# Patient Record
Sex: MALE | Race: WHITE | Employment: FULL TIME | URBAN - METROPOLITAN AREA
[De-identification: names, ages, dates, MRNs, and addresses within clinical notes are randomized per-mention and may not be internally consistent; named-entity substitution may affect disease eponyms.]

---

## 2017-04-04 ENCOUNTER — TELEPHONE (OUTPATIENT)
Dept: GASTROENTEROLOGY | Age: 62
End: 2017-04-04

## 2020-04-05 ENCOUNTER — APPOINTMENT (OUTPATIENT)
Dept: GENERAL RADIOLOGY | Age: 65
DRG: 234 | End: 2020-04-05
Payer: OTHER GOVERNMENT

## 2020-04-05 ENCOUNTER — HOSPITAL ENCOUNTER (INPATIENT)
Age: 65
LOS: 8 days | Discharge: HOME | DRG: 234 | End: 2020-04-13
Attending: EMERGENCY MEDICINE | Admitting: INTERNAL MEDICINE
Payer: OTHER GOVERNMENT

## 2020-04-05 PROBLEM — R07.9 CHEST PAIN: Status: ACTIVE | Noted: 2020-04-05

## 2020-04-05 LAB
ALBUMIN SERPL-MCNC: 3.5 GM/DL (ref 3.4–5)
ALP BLD-CCNC: 103 IU/L (ref 40–129)
ALT SERPL-CCNC: 5 U/L (ref 10–40)
ANION GAP SERPL CALCULATED.3IONS-SCNC: 12 MMOL/L (ref 4–16)
AST SERPL-CCNC: 5 IU/L (ref 15–37)
BASOPHILS ABSOLUTE: 0 K/CU MM
BASOPHILS RELATIVE PERCENT: 0.7 % (ref 0–1)
BILIRUB SERPL-MCNC: 0.3 MG/DL (ref 0–1)
BUN BLDV-MCNC: 11 MG/DL (ref 6–23)
CALCIUM SERPL-MCNC: 8.5 MG/DL (ref 8.3–10.6)
CHLORIDE BLD-SCNC: 97 MMOL/L (ref 99–110)
CO2: 20 MMOL/L (ref 21–32)
CREAT SERPL-MCNC: 0.8 MG/DL (ref 0.9–1.3)
DIFFERENTIAL TYPE: ABNORMAL
EOSINOPHILS ABSOLUTE: 0.1 K/CU MM
EOSINOPHILS RELATIVE PERCENT: 2.3 % (ref 0–3)
GFR AFRICAN AMERICAN: >60 ML/MIN/1.73M2
GFR NON-AFRICAN AMERICAN: >60 ML/MIN/1.73M2
GLUCOSE BLD-MCNC: 182 MG/DL (ref 70–99)
GLUCOSE BLD-MCNC: 237 MG/DL (ref 70–99)
GLUCOSE BLD-MCNC: 269 MG/DL (ref 70–99)
HCT VFR BLD CALC: 42.9 % (ref 42–52)
HEMOGLOBIN: 15.4 GM/DL (ref 13.5–18)
IMMATURE NEUTROPHIL %: 0.3 % (ref 0–0.43)
LYMPHOCYTES ABSOLUTE: 2 K/CU MM
LYMPHOCYTES RELATIVE PERCENT: 33.3 % (ref 24–44)
MCH RBC QN AUTO: 31.9 PG (ref 27–31)
MCHC RBC AUTO-ENTMCNC: 35.9 % (ref 32–36)
MCV RBC AUTO: 88.8 FL (ref 78–100)
MONOCYTES ABSOLUTE: 0.6 K/CU MM
MONOCYTES RELATIVE PERCENT: 10.3 % (ref 0–4)
NUCLEATED RBC %: 0 %
PDW BLD-RTO: 11.9 % (ref 11.7–14.9)
PLATELET # BLD: 283 K/CU MM (ref 140–440)
PMV BLD AUTO: 9.4 FL (ref 7.5–11.1)
POTASSIUM SERPL-SCNC: 4.1 MMOL/L (ref 3.5–5.1)
PRO-BNP: 84.22 PG/ML
RBC # BLD: 4.83 M/CU MM (ref 4.6–6.2)
SEGMENTED NEUTROPHILS ABSOLUTE COUNT: 3.2 K/CU MM
SEGMENTED NEUTROPHILS RELATIVE PERCENT: 53.1 % (ref 36–66)
SODIUM BLD-SCNC: 129 MMOL/L (ref 135–145)
TOTAL IMMATURE NEUTOROPHIL: 0.02 K/CU MM
TOTAL NUCLEATED RBC: 0 K/CU MM
TOTAL PROTEIN: 6.5 GM/DL (ref 6.4–8.2)
TROPONIN T: 0.01 NG/ML
TROPONIN T: 0.1 NG/ML
WBC # BLD: 6.1 K/CU MM (ref 4–10.5)

## 2020-04-05 PROCEDURE — 6360000002 HC RX W HCPCS: Performed by: INTERNAL MEDICINE

## 2020-04-05 PROCEDURE — 96374 THER/PROPH/DIAG INJ IV PUSH: CPT

## 2020-04-05 PROCEDURE — 2580000003 HC RX 258: Performed by: INTERNAL MEDICINE

## 2020-04-05 PROCEDURE — 6370000000 HC RX 637 (ALT 250 FOR IP): Performed by: INTERNAL MEDICINE

## 2020-04-05 PROCEDURE — 6360000002 HC RX W HCPCS: Performed by: EMERGENCY MEDICINE

## 2020-04-05 PROCEDURE — 84484 ASSAY OF TROPONIN QUANT: CPT

## 2020-04-05 PROCEDURE — 6370000000 HC RX 637 (ALT 250 FOR IP): Performed by: EMERGENCY MEDICINE

## 2020-04-05 PROCEDURE — 93005 ELECTROCARDIOGRAM TRACING: CPT | Performed by: EMERGENCY MEDICINE

## 2020-04-05 PROCEDURE — 93005 ELECTROCARDIOGRAM TRACING: CPT | Performed by: INTERNAL MEDICINE

## 2020-04-05 PROCEDURE — 1200000000 HC SEMI PRIVATE

## 2020-04-05 PROCEDURE — 99222 1ST HOSP IP/OBS MODERATE 55: CPT | Performed by: INTERNAL MEDICINE

## 2020-04-05 PROCEDURE — 2700000000 HC OXYGEN THERAPY PER DAY

## 2020-04-05 PROCEDURE — 71045 X-RAY EXAM CHEST 1 VIEW: CPT

## 2020-04-05 PROCEDURE — 36415 COLL VENOUS BLD VENIPUNCTURE: CPT

## 2020-04-05 PROCEDURE — 82962 GLUCOSE BLOOD TEST: CPT

## 2020-04-05 PROCEDURE — 83880 ASSAY OF NATRIURETIC PEPTIDE: CPT

## 2020-04-05 PROCEDURE — 85025 COMPLETE CBC W/AUTO DIFF WBC: CPT

## 2020-04-05 PROCEDURE — 80053 COMPREHEN METABOLIC PANEL: CPT

## 2020-04-05 PROCEDURE — 94640 AIRWAY INHALATION TREATMENT: CPT

## 2020-04-05 PROCEDURE — 99285 EMERGENCY DEPT VISIT HI MDM: CPT

## 2020-04-05 PROCEDURE — 94761 N-INVAS EAR/PLS OXIMETRY MLT: CPT

## 2020-04-05 RX ORDER — PANTOPRAZOLE SODIUM 40 MG/1
40 TABLET, DELAYED RELEASE ORAL DAILY
Status: DISCONTINUED | OUTPATIENT
Start: 2020-04-05 | End: 2020-04-06

## 2020-04-05 RX ORDER — FAMOTIDINE 20 MG/1
20 TABLET, FILM COATED ORAL DAILY
Status: DISCONTINUED | OUTPATIENT
Start: 2020-04-05 | End: 2020-04-05 | Stop reason: SDUPTHER

## 2020-04-05 RX ORDER — ASPIRIN 81 MG/1
324 TABLET, CHEWABLE ORAL ONCE
Status: COMPLETED | OUTPATIENT
Start: 2020-04-05 | End: 2020-04-05

## 2020-04-05 RX ORDER — LOSARTAN POTASSIUM 50 MG/1
50 TABLET ORAL DAILY
Status: DISCONTINUED | OUTPATIENT
Start: 2020-04-05 | End: 2020-04-05

## 2020-04-05 RX ORDER — ONDANSETRON 2 MG/ML
4 INJECTION INTRAMUSCULAR; INTRAVENOUS EVERY 6 HOURS PRN
Status: DISCONTINUED | OUTPATIENT
Start: 2020-04-05 | End: 2020-04-09

## 2020-04-05 RX ORDER — SODIUM CHLORIDE 9 MG/ML
INJECTION, SOLUTION INTRAVENOUS CONTINUOUS
Status: DISCONTINUED | OUTPATIENT
Start: 2020-04-05 | End: 2020-04-07

## 2020-04-05 RX ORDER — LOSARTAN POTASSIUM 50 MG/1
50 TABLET ORAL ONCE
Status: COMPLETED | OUTPATIENT
Start: 2020-04-05 | End: 2020-04-05

## 2020-04-05 RX ORDER — KETOROLAC TROMETHAMINE 30 MG/ML
30 INJECTION, SOLUTION INTRAMUSCULAR; INTRAVENOUS
Status: COMPLETED | OUTPATIENT
Start: 2020-04-05 | End: 2020-04-05

## 2020-04-05 RX ORDER — SODIUM CHLORIDE 0.9 % (FLUSH) 0.9 %
10 SYRINGE (ML) INJECTION EVERY 12 HOURS SCHEDULED
Status: DISCONTINUED | OUTPATIENT
Start: 2020-04-05 | End: 2020-04-06 | Stop reason: SDUPTHER

## 2020-04-05 RX ORDER — SODIUM CHLORIDE 0.9 % (FLUSH) 0.9 %
10 SYRINGE (ML) INJECTION PRN
Status: DISCONTINUED | OUTPATIENT
Start: 2020-04-05 | End: 2020-04-06 | Stop reason: SDUPTHER

## 2020-04-05 RX ORDER — MORPHINE SULFATE 2 MG/ML
2 INJECTION, SOLUTION INTRAMUSCULAR; INTRAVENOUS ONCE
Status: COMPLETED | OUTPATIENT
Start: 2020-04-05 | End: 2020-04-05

## 2020-04-05 RX ORDER — POLYETHYLENE GLYCOL 3350 17 G/17G
17 POWDER, FOR SOLUTION ORAL DAILY PRN
Status: DISCONTINUED | OUTPATIENT
Start: 2020-04-05 | End: 2020-04-09

## 2020-04-05 RX ORDER — ASPIRIN 81 MG/1
81 TABLET, CHEWABLE ORAL DAILY
Status: DISCONTINUED | OUTPATIENT
Start: 2020-04-06 | End: 2020-04-06

## 2020-04-05 RX ORDER — LIDOCAINE HYDROCHLORIDE 20 MG/ML
15 SOLUTION OROPHARYNGEAL
Status: DISCONTINUED | OUTPATIENT
Start: 2020-04-05 | End: 2020-04-09

## 2020-04-05 RX ORDER — MORPHINE SULFATE 4 MG/ML
4 INJECTION, SOLUTION INTRAMUSCULAR; INTRAVENOUS ONCE
Status: COMPLETED | OUTPATIENT
Start: 2020-04-05 | End: 2020-04-05

## 2020-04-05 RX ORDER — ALBUTEROL SULFATE 2.5 MG/3ML
2.5 SOLUTION RESPIRATORY (INHALATION) EVERY 4 HOURS PRN
Status: DISCONTINUED | OUTPATIENT
Start: 2020-04-05 | End: 2020-04-09

## 2020-04-05 RX ORDER — KETOROLAC TROMETHAMINE 30 MG/ML
30 INJECTION, SOLUTION INTRAMUSCULAR; INTRAVENOUS EVERY 6 HOURS PRN
Status: DISCONTINUED | OUTPATIENT
Start: 2020-04-05 | End: 2020-04-07

## 2020-04-05 RX ORDER — MAGNESIUM HYDROXIDE/ALUMINUM HYDROXICE/SIMETHICONE 120; 1200; 1200 MG/30ML; MG/30ML; MG/30ML
30 SUSPENSION ORAL EVERY 6 HOURS PRN
Status: DISCONTINUED | OUTPATIENT
Start: 2020-04-05 | End: 2020-04-09

## 2020-04-05 RX ORDER — LOSARTAN POTASSIUM 100 MG/1
100 TABLET ORAL DAILY
Status: DISCONTINUED | OUTPATIENT
Start: 2020-04-06 | End: 2020-04-07

## 2020-04-05 RX ORDER — KETOROLAC TROMETHAMINE 30 MG/ML
30 INJECTION, SOLUTION INTRAMUSCULAR; INTRAVENOUS EVERY 6 HOURS PRN
Status: DISCONTINUED | OUTPATIENT
Start: 2020-04-05 | End: 2020-04-05 | Stop reason: SDUPTHER

## 2020-04-05 RX ORDER — GLIPIZIDE 10 MG/1
10 TABLET ORAL
COMMUNITY
End: 2021-08-26 | Stop reason: SDUPTHER

## 2020-04-05 RX ORDER — ATORVASTATIN CALCIUM 40 MG/1
40 TABLET, FILM COATED ORAL NIGHTLY
Status: DISCONTINUED | OUTPATIENT
Start: 2020-04-05 | End: 2020-04-09

## 2020-04-05 RX ORDER — ALOGLIPTIN 25 MG/1
25 TABLET, FILM COATED ORAL DAILY
COMMUNITY
End: 2021-08-26 | Stop reason: SDUPTHER

## 2020-04-05 RX ORDER — NITROGLYCERIN 0.4 MG/1
0.4 TABLET SUBLINGUAL EVERY 5 MIN PRN
Status: COMPLETED | OUTPATIENT
Start: 2020-04-05 | End: 2020-04-05

## 2020-04-05 RX ORDER — INSULIN GLARGINE 100 [IU]/ML
10 INJECTION, SOLUTION SUBCUTANEOUS NIGHTLY
Status: DISCONTINUED | OUTPATIENT
Start: 2020-04-05 | End: 2020-04-09

## 2020-04-05 RX ORDER — PROMETHAZINE HYDROCHLORIDE 25 MG/1
12.5 TABLET ORAL EVERY 6 HOURS PRN
Status: DISCONTINUED | OUTPATIENT
Start: 2020-04-05 | End: 2020-04-09

## 2020-04-05 RX ORDER — ASPIRIN 81 MG/1
81 TABLET, CHEWABLE ORAL DAILY
Status: DISCONTINUED | OUTPATIENT
Start: 2020-04-05 | End: 2020-04-05 | Stop reason: SDUPTHER

## 2020-04-05 RX ORDER — ATORVASTATIN CALCIUM 40 MG/1
40 TABLET, FILM COATED ORAL DAILY
COMMUNITY
End: 2021-08-26 | Stop reason: SDUPTHER

## 2020-04-05 RX ADMIN — ALUMINUM HYDROXIDE, MAGNESIUM HYDROXIDE, AND SIMETHICONE 30 ML: 200; 200; 20 SUSPENSION ORAL at 13:55

## 2020-04-05 RX ADMIN — SODIUM CHLORIDE, PRESERVATIVE FREE 10 ML: 5 INJECTION INTRAVENOUS at 22:20

## 2020-04-05 RX ADMIN — MORPHINE SULFATE 4 MG: 4 INJECTION, SOLUTION INTRAMUSCULAR; INTRAVENOUS at 08:39

## 2020-04-05 RX ADMIN — NITROGLYCERIN 0.4 MG: 0.4 TABLET, ORALLY DISINTEGRATING SUBLINGUAL at 06:46

## 2020-04-05 RX ADMIN — INSULIN GLARGINE 10 UNITS: 100 INJECTION, SOLUTION SUBCUTANEOUS at 22:13

## 2020-04-05 RX ADMIN — INSULIN LISPRO 2 UNITS: 100 INJECTION, SOLUTION INTRAVENOUS; SUBCUTANEOUS at 17:43

## 2020-04-05 RX ADMIN — SODIUM CHLORIDE: 9 INJECTION, SOLUTION INTRAVENOUS at 22:20

## 2020-04-05 RX ADMIN — ASPIRIN 81 MG 324 MG: 81 TABLET ORAL at 06:46

## 2020-04-05 RX ADMIN — LOSARTAN POTASSIUM 50 MG: 50 TABLET, FILM COATED ORAL at 17:24

## 2020-04-05 RX ADMIN — INSULIN LISPRO 1 UNITS: 100 INJECTION, SOLUTION INTRAVENOUS; SUBCUTANEOUS at 22:14

## 2020-04-05 RX ADMIN — NITROGLYCERIN 0.4 MG: 0.4 TABLET, ORALLY DISINTEGRATING SUBLINGUAL at 20:55

## 2020-04-05 RX ADMIN — MORPHINE SULFATE 2 MG: 2 INJECTION, SOLUTION INTRAMUSCULAR; INTRAVENOUS at 11:13

## 2020-04-05 RX ADMIN — SODIUM CHLORIDE, PRESERVATIVE FREE 10 ML: 5 INJECTION INTRAVENOUS at 11:12

## 2020-04-05 RX ADMIN — LOSARTAN POTASSIUM 50 MG: 50 TABLET, FILM COATED ORAL at 11:12

## 2020-04-05 RX ADMIN — LOSARTAN POTASSIUM 50 MG: 50 TABLET, FILM COATED ORAL at 22:15

## 2020-04-05 RX ADMIN — LIDOCAINE HYDROCHLORIDE 15 ML: 20 SOLUTION ORAL; TOPICAL at 13:56

## 2020-04-05 RX ADMIN — SODIUM CHLORIDE: 9 INJECTION, SOLUTION INTRAVENOUS at 13:30

## 2020-04-05 RX ADMIN — ATORVASTATIN CALCIUM 40 MG: 40 TABLET, FILM COATED ORAL at 22:15

## 2020-04-05 RX ADMIN — ENOXAPARIN SODIUM 120 MG: 120 INJECTION SUBCUTANEOUS at 17:24

## 2020-04-05 RX ADMIN — PROMETHAZINE HYDROCHLORIDE 12.5 MG: 25 TABLET ORAL at 22:14

## 2020-04-05 RX ADMIN — SODIUM CHLORIDE: 9 INJECTION, SOLUTION INTRAVENOUS at 11:13

## 2020-04-05 RX ADMIN — ALBUTEROL SULFATE 2.5 MG: 2.5 SOLUTION RESPIRATORY (INHALATION) at 20:59

## 2020-04-05 RX ADMIN — PANTOPRAZOLE SODIUM 40 MG: 40 TABLET, DELAYED RELEASE ORAL at 11:12

## 2020-04-05 RX ADMIN — KETOROLAC TROMETHAMINE 30 MG: 30 INJECTION, SOLUTION INTRAMUSCULAR at 22:13

## 2020-04-05 RX ADMIN — KETOROLAC TROMETHAMINE 30 MG: 30 INJECTION, SOLUTION INTRAMUSCULAR at 13:55

## 2020-04-05 RX ADMIN — FAMOTIDINE 20 MG: 20 TABLET, FILM COATED ORAL at 11:12

## 2020-04-05 RX ADMIN — NITROGLYCERIN 0.4 MG: 0.4 TABLET, ORALLY DISINTEGRATING SUBLINGUAL at 10:50

## 2020-04-05 ASSESSMENT — PAIN SCALES - GENERAL
PAINLEVEL_OUTOF10: 8
PAINLEVEL_OUTOF10: 7
PAINLEVEL_OUTOF10: 1
PAINLEVEL_OUTOF10: 5
PAINLEVEL_OUTOF10: 8

## 2020-04-05 ASSESSMENT — PAIN DESCRIPTION - PAIN TYPE
TYPE: ACUTE PAIN

## 2020-04-05 ASSESSMENT — PAIN DESCRIPTION - FREQUENCY
FREQUENCY: CONTINUOUS

## 2020-04-05 ASSESSMENT — PAIN DESCRIPTION - PROGRESSION
CLINICAL_PROGRESSION: RAPIDLY IMPROVING
CLINICAL_PROGRESSION: GRADUALLY IMPROVING
CLINICAL_PROGRESSION: RAPIDLY IMPROVING
CLINICAL_PROGRESSION: RAPIDLY IMPROVING

## 2020-04-05 ASSESSMENT — PAIN DESCRIPTION - LOCATION
LOCATION: CHEST

## 2020-04-05 ASSESSMENT — PAIN DESCRIPTION - ONSET
ONSET: PROGRESSIVE
ONSET: GRADUAL
ONSET: GRADUAL

## 2020-04-05 ASSESSMENT — PAIN DESCRIPTION - ORIENTATION
ORIENTATION: ANTERIOR;MID;LEFT;RIGHT
ORIENTATION: ANTERIOR;LEFT;RIGHT;MID

## 2020-04-05 ASSESSMENT — PAIN DESCRIPTION - DESCRIPTORS
DESCRIPTORS: DISCOMFORT;TIGHTNESS
DESCRIPTORS: DISCOMFORT;PRESSURE

## 2020-04-05 NOTE — ED PROVIDER NOTES
hospitalist team.    Clinical Impression:  1. Chest pain, unspecified type      Disposition referral (if applicable):  No follow-up provider specified. Disposition medications (if applicable):  Current Discharge Medication List        ED Provider Disposition Time  DISPOSITION Admitted 04/05/2020 09:24:00 AM      Comment: Please note this report has been produced using speech recognition software and may contain errors related to that system including errors in grammar, punctuation, and spelling, as well as words and phrases that may be inappropriate. Efforts were made to edit the dictations.         Roxie Mars MD  04/05/20 7320

## 2020-04-05 NOTE — CONSULTS
CARDIOLOGY CONSULT NOTE   Reason for consultation: chest pain / NSTEMI    Referring physician:  Blair Orozco MD     Primary care physician: No primary care provider on file. Dear Dr. Vikas Jackson  Thanks for the consult. History of present illness:Marshal is a 62 y. o.year old who  presents with chest pain, its localized to lower portion of sternum, non radiating, ache like, 5/10, constant, no aggravating or alleviating factors noted, he was given, nTG WHICH did not help, and morphine helped him and he slept comfortably, 2 weeks ago he was in [facility], had +ve trop max 0.3, he did not have echo/stress or any other ischemia work  Up, he was discharged on 3 days of antibioitcs, he has CXR on admission which showed possible rt lower lobe pneumonia. Today his 2nd trop is 0.09 and EKG is normal./  Chief Complaint   Patient presents with    Chest Pain     Blood pressure, cholesterol, blood glucose and weight are well controlled. Past medical history:    has a past medical history of Diabetes mellitus ([address] Hyperlipidemia, and Hypertension. Past surgical history:   has a past surgical history that includes Cholecystectomy; hernia repair; fracture surgery; Anus surgery; Colonoscopy (2008); Colonoscopy (11/29/2016); and Endoscopy, colon, diagnostic (11/29/2016). Social History:   reports that he has quit smoking. He has never used smokeless tobacco. He reports that he does not drink alcohol or use drugs.   Family history:   no family history of CAD, STROKE of DM    Allergies   Allergen Reactions    Vicodin [Hydrocodone-Acetaminophen] Other (See Comments)     Dislikes the feeling it gives him    Percocet [Oxycodone-Acetaminophen] Nausea And Vomiting       nitroGLYCERIN (NITROSTAT) SL tablet 0.4 mg, Q5 Min PRN  albuterol (PROVENTIL) nebulizer solution 2.5 mg, Q4H PRN  losartan (COZAAR) tablet 50 mg, Daily  pantoprazole (PROTONIX) tablet 40 mg, Daily  famotidine (PEPCID) tablet 20 mg, Daily  sodium chloride flush 0.9 % injection 10 mL, 2 times per day  sodium chloride flush 0.9 % injection 10 mL, PRN  polyethylene glycol (GLYCOLAX) packet 17 g, Daily PRN  promethazine (PHENERGAN) tablet 12.5 mg, Q6H PRN    Or  ondansetron (ZOFRAN) injection 4 mg, Q6H PRN  atorvastatin (LIPITOR) tablet 40 mg, Nightly  [START ON 4/6/2020] aspirin chewable tablet 81 mg, Daily  0.9 % sodium chloride infusion, Continuous  ketorolac (TORADOL) injection 30 mg, Once PRN  lidocaine viscous hcl (XYLOCAINE) 2 % solution 15 mL, Q3H PRN  aluminum & magnesium hydroxide-simethicone (MAALOX) 200-200-20 MG/5ML suspension 30 mL, Q6H PRN  [START ON 4/6/2020] enoxaparin (LOVENOX) injection 120 mg, Daily      Current Facility-Administered Medications   Medication Dose Route Frequency Provider Last Rate Last Dose    nitroGLYCERIN (NITROSTAT) SL tablet 0.4 mg  0.4 mg Sublingual Q5 Min PRN Aurelio Cramer MD   0.4 mg at 04/05/20 1050    albuterol (PROVENTIL) nebulizer solution 2.5 mg  2.5 mg Nebulization Q4H PRN Angel Perez MD        losartan (COZAAR) tablet 50 mg  50 mg Oral Daily Angel Perez MD   50 mg at 04/05/20 1112    pantoprazole (PROTONIX) tablet 40 mg  40 mg Oral Daily Angel Perez MD   40 mg at 04/05/20 1112    famotidine (PEPCID) tablet 20 mg  20 mg Oral Daily Angel Perez MD   20 mg at 04/05/20 1112    sodium chloride flush 0.9 % injection 10 mL  10 mL Intravenous 2 times per day Angel Perez MD   10 mL at 04/05/20 1112    sodium chloride flush 0.9 % injection 10 mL  10 mL Intravenous PRN Angel Perez MD        polyethylene glycol [facility]) packet 17 g  17 g Oral Daily PRN Angel Perez MD        promethazine (PHENERGAN) tablet 12.5 mg  12.5 mg Oral Q6H PRN Angel Perez MD        Or    ondansetron [facility]) injection 4 mg  4 mg Intravenous Q6H PRN Angel Perez MD        atorvastatin (LIPITOR) tablet 40 mg  40 mg Oral Nightly Angel Perez MD        [START ON 4/6/2020] aspirin chewable tablet 81 mg  81 mg Oral Daily Angel Perez MD        0.9 % sodium chloride infusion   Intravenous Continuous Kevin Lainez MD 75 mL/hr at 04/05/20 1113      ketorolac (TORADOL) injection 30 mg  30 mg Intravenous Once PRN Kevin Lainez MD        lidocaine viscous hcl (XYLOCAINE) 2 % solution 15 mL  15 mL Mouth/Throat Q3H PRN Kevin Lainez MD        aluminum & magnesium hydroxide-simethicone (MAALOX) 200-200-20 MG/5ML suspension 30 mL  30 mL Oral Q6H PRN Kevin Lainez MD       Silvina Vasquez [START ON 4/6/2020] enoxaparin (LOVENOX) injection 120 mg  1 mg/kg Subcutaneous Daily Kevin Lainez MD         Review of Systems:   · Constitutional: No Fever or Weight Loss   · Eyes: No Decreased Vision  · ENT: No Headaches, Hearing Loss or Vertigo  · Cardiovascular: +chest pain, dyspnea on exertion, palpitations or loss of consciousness  · Respiratory: No cough or wheezing    · Gastrointestinal: No abdominal pain, appetite loss, blood in stools, constipation, diarrhea or heartburn  · Genitourinary: No dysuria, trouble voiding, or hematuria  · Musculoskeletal:  No gait disturbance, weakness or joint complaints  · Integumentary: No rash or pruritis  · Neurological: No TIA or stroke symptoms  · Psychiatric: No anxiety or depression  · Endocrine: No malaise, fatigue or temperature intolerance  · Hematologic/Lymphatic: No bleeding problems, blood clots or swollen lymph nodes  · Allergic/Immunologic: No nasal congestion or hives  All systems negative except as marked. ·   ·      Physical Examination:    Vitals:    04/05/20 1152   BP: 153/94   Pulse: 73   Resp: 13   Temp: afebrile   SpO2: 97%      Wt Readings from Last 3 Encounters:   04/05/20 248 lb (112.5 kg)   11/29/16 245 lb (111.1 kg)   11/28/16 245 lb (111.1 kg)     Body mass index is 35.58 kg/m². General Appearance:  No distress, conversant    Constitutional:  Well developed, Well nourished, No acute distress, Non-toxic appearance.    HENT:  Normocephalic, Atraumatic, Bilateral external ears normal, Oropharynx

## 2020-04-05 NOTE — H&P
History and Physical      Name:  Anika Santiago /Age/Sex: 1955  (62 y.o. male)   MRN & CSN:  [id number] & [id number] Admission Date/Time: 2020  6:10 AM   Location:  [facility] PCP: No primary care provider on file. Hospital Day: 1        History of Present Illness:     Chief Complaint: chest pain     Anika Santiago is a 62 y.o.  male with a history of DM II who presents with chest pain which began this morning at 4:30 AM.  He says that the pain is constant. It is 6-8/10 pain. He says that it is like a squeezing pain. He says that this began after he went to the bathroom in the morning. Nothing makes it better, nothing makes it worse. He does say that he has a history of esophageal dyskinesia. He says that this feels like that but much worse. He usually drinks cold water and it goes. He says he used to see Dr. Sonia Vasquez and then another doctor who moved to [facility]. His troponin was 0.012 and then 0.096. He is being admitted for a rule out. Ten point ROS reviewed negative, unless as noted above    Objective:   No intake or output data in the 24 hours ending 20 1218   Vitals:   Vitals:    20 1152   BP:    Pulse:    Resp: 13   Temp:    SpO2: 97%     Physical Exam:   GEN Awake male, sitting upright in bed in no apparent distress. Appears given age. EYES Pupils are equally round. No scleral erythema, discharge, or conjunctivitis. HENT Mucous membranes are moist. Oral pharynx without exudates, no evidence of thrush. NECK Supple, no apparent thyromegaly or masses. RESP Clear to auscultation, no wheezes, rales or rhonchi. Symmetric chest movement while on room air. CARDIO/VASC S1/S2 auscultated. Regular rate without appreciable murmurs, rubs, or gallops. No JVD or carotid bruits. Peripheral pulses equal bilaterally and palpable. No peripheral edema. GI Abdomen is soft without significant tenderness, masses, or guarding. Bowel sounds are normoactive.  Rectal exam

## 2020-04-05 NOTE — PROGRESS NOTES
B/P 176/101, he takes losartan 100mg at home and only 50mg was ordered here. The toradol and/or GI cocktail helped his pain some, he is rating it at a 2 right now. Dr. Jack Dodd paged because he has a troponin ordered for 6 am to see if he wants another troponin before 6 am because last troponin was at 1055.   Dr. Jack Dodd responded and he does not want any troponins until 6 am.

## 2020-04-05 NOTE — ED NOTES
Bed: ED-16  Expected date: 4/5/20  Expected time: 6:10 AM  [facility]: [facility]  Comments:  EMS- [id number], RN  04/05/20 9795

## 2020-04-06 ENCOUNTER — APPOINTMENT (OUTPATIENT)
Dept: ULTRASOUND IMAGING | Age: 65
DRG: 234 | End: 2020-04-06
Payer: OTHER GOVERNMENT

## 2020-04-06 ENCOUNTER — APPOINTMENT (OUTPATIENT)
Dept: CT IMAGING | Age: 65
DRG: 234 | End: 2020-04-06
Payer: OTHER GOVERNMENT

## 2020-04-06 PROBLEM — I25.119 CORONARY ARTERY DISEASE INVOLVING NATIVE CORONARY ARTERY OF NATIVE HEART WITH ANGINA PECTORIS (HCC): Status: ACTIVE | Noted: 2020-04-06

## 2020-04-06 PROBLEM — I21.4 NSTEMI (NON-ST ELEVATED MYOCARDIAL INFARCTION) (HCC): Status: ACTIVE | Noted: 2020-04-06

## 2020-04-06 PROBLEM — E11.59 TYPE 2 DIABETES MELLITUS WITH CIRCULATORY DISORDER, WITHOUT LONG-TERM CURRENT USE OF INSULIN (HCC): Status: ACTIVE | Noted: 2020-04-06

## 2020-04-06 LAB
ALBUMIN SERPL-MCNC: 3.8 GM/DL (ref 3.4–5)
ALP BLD-CCNC: 113 IU/L (ref 40–128)
ALT SERPL-CCNC: 22 U/L (ref 10–40)
ANION GAP SERPL CALCULATED.3IONS-SCNC: 14 MMOL/L (ref 4–16)
APTT: 25.6 SECONDS (ref 25.1–37.1)
AST SERPL-CCNC: 32 IU/L (ref 15–37)
BACTERIA: NEGATIVE /HPF
BASE EXCESS MIXED: ABNORMAL (ref 0–1.2)
BASOPHILS ABSOLUTE: 0 K/CU MM
BASOPHILS RELATIVE PERCENT: 0.2 % (ref 0–1)
BILIRUB SERPL-MCNC: 1.1 MG/DL (ref 0–1)
BILIRUBIN URINE: NEGATIVE MG/DL
BLOOD, URINE: NEGATIVE
BUN BLDV-MCNC: 10 MG/DL (ref 6–23)
CALCIUM SERPL-MCNC: 9 MG/DL (ref 8.3–10.6)
CARBON MONOXIDE, BLOOD: 1.7 % (ref 0–5)
CHLORIDE BLD-SCNC: 101 MMOL/L (ref 99–110)
CLARITY: CLEAR
CO2 CONTENT: 25.9 MMOL/L (ref 19–24)
CO2: 20 MMOL/L (ref 21–32)
COLOR: ABNORMAL
COMMENT: ABNORMAL
CREAT SERPL-MCNC: 1 MG/DL (ref 0.9–1.3)
DIFFERENTIAL TYPE: ABNORMAL
EKG ATRIAL RATE: 56 BPM
EKG ATRIAL RATE: 60 BPM
EKG ATRIAL RATE: 67 BPM
EKG ATRIAL RATE: 71 BPM
EKG ATRIAL RATE: 79 BPM
EKG DIAGNOSIS: NORMAL
EKG P AXIS: 119 DEGREES
EKG P AXIS: 46 DEGREES
EKG P AXIS: 48 DEGREES
EKG P AXIS: 50 DEGREES
EKG P AXIS: 52 DEGREES
EKG P-R INTERVAL: 146 MS
EKG P-R INTERVAL: 152 MS
EKG P-R INTERVAL: 154 MS
EKG P-R INTERVAL: 156 MS
EKG P-R INTERVAL: 162 MS
EKG Q-T INTERVAL: 442 MS
EKG Q-T INTERVAL: 446 MS
EKG Q-T INTERVAL: 446 MS
EKG Q-T INTERVAL: 454 MS
EKG Q-T INTERVAL: 466 MS
EKG QRS DURATION: 102 MS
EKG QRS DURATION: 104 MS
EKG QRS DURATION: 108 MS
EKG QRS DURATION: 108 MS
EKG QRS DURATION: 112 MS
EKG QTC CALCULATION (BAZETT): 446 MS
EKG QTC CALCULATION (BAZETT): 449 MS
EKG QTC CALCULATION (BAZETT): 479 MS
EKG QTC CALCULATION (BAZETT): 484 MS
EKG QTC CALCULATION (BAZETT): 506 MS
EKG R AXIS: -3 DEGREES
EKG R AXIS: 16 DEGREES
EKG R AXIS: 162 DEGREES
EKG R AXIS: 35 DEGREES
EKG R AXIS: 9 DEGREES
EKG T AXIS: 100 DEGREES
EKG T AXIS: 108 DEGREES
EKG T AXIS: 23 DEGREES
EKG T AXIS: 36 DEGREES
EKG T AXIS: 46 DEGREES
EKG VENTRICULAR RATE: 56 BPM
EKG VENTRICULAR RATE: 60 BPM
EKG VENTRICULAR RATE: 67 BPM
EKG VENTRICULAR RATE: 71 BPM
EKG VENTRICULAR RATE: 79 BPM
EOSINOPHILS ABSOLUTE: 0.1 K/CU MM
EOSINOPHILS RELATIVE PERCENT: 0.5 % (ref 0–3)
ESTIMATED AVERAGE GLUCOSE: 186 MG/DL
GFR AFRICAN AMERICAN: >60 ML/MIN/1.73M2
GFR NON-AFRICAN AMERICAN: >60 ML/MIN/1.73M2
GLUCOSE BLD-MCNC: 172 MG/DL (ref 70–99)
GLUCOSE BLD-MCNC: 172 MG/DL (ref 70–99)
GLUCOSE BLD-MCNC: 191 MG/DL (ref 70–99)
GLUCOSE BLD-MCNC: 263 MG/DL (ref 70–99)
GLUCOSE, URINE: 50 MG/DL
HBA1C MFR BLD: 8.1 % (ref 4.2–6.3)
HCO3 ARTERIAL: 24.7 MMOL/L (ref 18–23)
HCT VFR BLD CALC: 42 % (ref 42–52)
HCT VFR BLD CALC: 47.3 % (ref 42–52)
HEMOGLOBIN: 14.9 GM/DL (ref 13.5–18)
HEMOGLOBIN: 16.5 GM/DL (ref 13.5–18)
IMMATURE NEUTROPHIL %: 0.3 % (ref 0–0.43)
INR BLD: 0.98 INDEX
KETONES, URINE: NEGATIVE MG/DL
LEUKOCYTE ESTERASE, URINE: NEGATIVE
LV EF: 40 %
LV EF: 50 %
LVEF MODALITY: NORMAL
LVEF MODALITY: NORMAL
LYMPHOCYTES ABSOLUTE: 2.4 K/CU MM
LYMPHOCYTES RELATIVE PERCENT: 19.9 % (ref 24–44)
MAGNESIUM: 2.8 MG/DL (ref 1.8–2.4)
MCH RBC QN AUTO: 31.4 PG (ref 27–31)
MCH RBC QN AUTO: 31.4 PG (ref 27–31)
MCHC RBC AUTO-ENTMCNC: 34.9 % (ref 32–36)
MCHC RBC AUTO-ENTMCNC: 35.5 % (ref 32–36)
MCV RBC AUTO: 88.6 FL (ref 78–100)
MCV RBC AUTO: 89.9 FL (ref 78–100)
METHEMOGLOBIN ARTERIAL: 1 %
MONOCYTES ABSOLUTE: 0.9 K/CU MM
MONOCYTES RELATIVE PERCENT: 7.8 % (ref 0–4)
NITRITE URINE, QUANTITATIVE: NEGATIVE
NUCLEATED RBC %: 0 %
O2 SATURATION: 91.2 % (ref 96–97)
PCO2 ARTERIAL: 39 MMHG (ref 32–45)
PDW BLD-RTO: 12 % (ref 11.7–14.9)
PDW BLD-RTO: 12 % (ref 11.7–14.9)
PH BLOOD: 7.41 (ref 7.34–7.45)
PH, URINE: 8 (ref 5–8)
PLATELET # BLD: 261 K/CU MM (ref 140–440)
PLATELET # BLD: 297 K/CU MM (ref 140–440)
PMV BLD AUTO: 9.7 FL (ref 7.5–11.1)
PMV BLD AUTO: 9.7 FL (ref 7.5–11.1)
PO2 ARTERIAL: 60 MMHG (ref 75–100)
POTASSIUM SERPL-SCNC: 4.3 MMOL/L (ref 3.5–5.1)
PROCALCITONIN: 0.06
PROTEIN UA: NEGATIVE MG/DL
PROTHROMBIN TIME: 11.8 SECONDS (ref 11.7–14.5)
RBC # BLD: 4.74 M/CU MM (ref 4.6–6.2)
RBC # BLD: 5.26 M/CU MM (ref 4.6–6.2)
RBC URINE: ABNORMAL /HPF (ref 0–3)
SEGMENTED NEUTROPHILS ABSOLUTE COUNT: 8.6 K/CU MM
SEGMENTED NEUTROPHILS RELATIVE PERCENT: 71.3 % (ref 36–66)
SODIUM BLD-SCNC: 135 MMOL/L (ref 135–145)
SPECIFIC GRAVITY UA: 1.02 (ref 1–1.03)
TOTAL IMMATURE NEUTOROPHIL: 0.04 K/CU MM
TOTAL NUCLEATED RBC: 0 K/CU MM
TOTAL PROTEIN: 6.4 GM/DL (ref 6.4–8.2)
TRICHOMONAS: ABNORMAL /HPF
TROPONIN T: 0.33 NG/ML
TROPONIN T: 0.38 NG/ML
UROBILINOGEN, URINE: NORMAL MG/DL (ref 0.2–1)
WBC # BLD: 12.1 K/CU MM (ref 4–10.5)
WBC # BLD: 8 K/CU MM (ref 4–10.5)
WBC UA: ABNORMAL /HPF (ref 0–2)

## 2020-04-06 PROCEDURE — 71250 CT THORAX DX C-: CPT

## 2020-04-06 PROCEDURE — 6370000000 HC RX 637 (ALT 250 FOR IP): Performed by: INTERNAL MEDICINE

## 2020-04-06 PROCEDURE — 6370000000 HC RX 637 (ALT 250 FOR IP): Performed by: SURGERY

## 2020-04-06 PROCEDURE — 93010 ELECTROCARDIOGRAM REPORT: CPT | Performed by: INTERNAL MEDICINE

## 2020-04-06 PROCEDURE — 2580000003 HC RX 258: Performed by: INTERNAL MEDICINE

## 2020-04-06 PROCEDURE — 82962 GLUCOSE BLOOD TEST: CPT

## 2020-04-06 PROCEDURE — 85025 COMPLETE CBC W/AUTO DIFF WBC: CPT

## 2020-04-06 PROCEDURE — 6360000002 HC RX W HCPCS

## 2020-04-06 PROCEDURE — 84484 ASSAY OF TROPONIN QUANT: CPT

## 2020-04-06 PROCEDURE — 99233 SBSQ HOSP IP/OBS HIGH 50: CPT | Performed by: INTERNAL MEDICINE

## 2020-04-06 PROCEDURE — 81001 URINALYSIS AUTO W/SCOPE: CPT

## 2020-04-06 PROCEDURE — 86900 BLOOD TYPING SEROLOGIC ABO: CPT

## 2020-04-06 PROCEDURE — 93458 L HRT ARTERY/VENTRICLE ANGIO: CPT | Performed by: INTERNAL MEDICINE

## 2020-04-06 PROCEDURE — 6370000000 HC RX 637 (ALT 250 FOR IP): Performed by: NURSE PRACTITIONER

## 2020-04-06 PROCEDURE — 93880 EXTRACRANIAL BILAT STUDY: CPT

## 2020-04-06 PROCEDURE — 93306 TTE W/DOPPLER COMPLETE: CPT

## 2020-04-06 PROCEDURE — 6360000004 HC RX CONTRAST MEDICATION

## 2020-04-06 PROCEDURE — B2151ZZ FLUOROSCOPY OF LEFT HEART USING LOW OSMOLAR CONTRAST: ICD-10-PCS | Performed by: INTERNAL MEDICINE

## 2020-04-06 PROCEDURE — 80053 COMPREHEN METABOLIC PANEL: CPT

## 2020-04-06 PROCEDURE — 2709999900 HC NON-CHARGEABLE SUPPLY

## 2020-04-06 PROCEDURE — 93971 EXTREMITY STUDY: CPT

## 2020-04-06 PROCEDURE — 36415 COLL VENOUS BLD VENIPUNCTURE: CPT

## 2020-04-06 PROCEDURE — 87081 CULTURE SCREEN ONLY: CPT

## 2020-04-06 PROCEDURE — P9016 RBC LEUKOCYTES REDUCED: HCPCS

## 2020-04-06 PROCEDURE — 86922 COMPATIBILITY TEST ANTIGLOB: CPT

## 2020-04-06 PROCEDURE — 85610 PROTHROMBIN TIME: CPT

## 2020-04-06 PROCEDURE — 84145 PROCALCITONIN (PCT): CPT

## 2020-04-06 PROCEDURE — B2111ZZ FLUOROSCOPY OF MULTIPLE CORONARY ARTERIES USING LOW OSMOLAR CONTRAST: ICD-10-PCS | Performed by: INTERNAL MEDICINE

## 2020-04-06 PROCEDURE — 82803 BLOOD GASES ANY COMBINATION: CPT

## 2020-04-06 PROCEDURE — C1894 INTRO/SHEATH, NON-LASER: HCPCS

## 2020-04-06 PROCEDURE — 94761 N-INVAS EAR/PLS OXIMETRY MLT: CPT

## 2020-04-06 PROCEDURE — 4A023N7 MEASUREMENT OF CARDIAC SAMPLING AND PRESSURE, LEFT HEART, PERCUTANEOUS APPROACH: ICD-10-PCS | Performed by: INTERNAL MEDICINE

## 2020-04-06 PROCEDURE — 85027 COMPLETE CBC AUTOMATED: CPT

## 2020-04-06 PROCEDURE — 2140000000 HC CCU INTERMEDIATE R&B

## 2020-04-06 PROCEDURE — 83036 HEMOGLOBIN GLYCOSYLATED A1C: CPT

## 2020-04-06 PROCEDURE — 86850 RBC ANTIBODY SCREEN: CPT

## 2020-04-06 PROCEDURE — 93458 L HRT ARTERY/VENTRICLE ANGIO: CPT

## 2020-04-06 PROCEDURE — 83735 ASSAY OF MAGNESIUM: CPT

## 2020-04-06 PROCEDURE — C1769 GUIDE WIRE: HCPCS

## 2020-04-06 PROCEDURE — 2500000003 HC RX 250 WO HCPCS

## 2020-04-06 PROCEDURE — 85730 THROMBOPLASTIN TIME PARTIAL: CPT

## 2020-04-06 PROCEDURE — 86901 BLOOD TYPING SEROLOGIC RH(D): CPT

## 2020-04-06 PROCEDURE — 93005 ELECTROCARDIOGRAM TRACING: CPT | Performed by: INTERNAL MEDICINE

## 2020-04-06 PROCEDURE — 94010 BREATHING CAPACITY TEST: CPT

## 2020-04-06 RX ORDER — SODIUM CHLORIDE 0.9 % (FLUSH) 0.9 %
10 SYRINGE (ML) INJECTION EVERY 12 HOURS SCHEDULED
Status: DISCONTINUED | OUTPATIENT
Start: 2020-04-06 | End: 2020-04-09

## 2020-04-06 RX ORDER — PANTOPRAZOLE SODIUM 40 MG/1
40 TABLET, DELAYED RELEASE ORAL 2 TIMES DAILY
Status: DISCONTINUED | OUTPATIENT
Start: 2020-04-06 | End: 2020-04-09

## 2020-04-06 RX ORDER — NITROGLYCERIN 0.4 MG/1
0.4 TABLET SUBLINGUAL EVERY 5 MIN PRN
Status: DISCONTINUED | OUTPATIENT
Start: 2020-04-06 | End: 2020-04-09

## 2020-04-06 RX ORDER — CARVEDILOL 12.5 MG/1
12.5 TABLET ORAL 2 TIMES DAILY WITH MEALS
Status: DISCONTINUED | OUTPATIENT
Start: 2020-04-06 | End: 2020-04-06

## 2020-04-06 RX ORDER — CHLORTHALIDONE 25 MG/1
25 TABLET ORAL DAILY
Status: DISCONTINUED | OUTPATIENT
Start: 2020-04-06 | End: 2020-04-09

## 2020-04-06 RX ORDER — ASPIRIN 81 MG/1
81 TABLET ORAL DAILY
Status: DISCONTINUED | OUTPATIENT
Start: 2020-04-06 | End: 2020-04-09

## 2020-04-06 RX ORDER — SODIUM CHLORIDE 0.9 % (FLUSH) 0.9 %
10 SYRINGE (ML) INJECTION PRN
Status: DISCONTINUED | OUTPATIENT
Start: 2020-04-06 | End: 2020-04-09

## 2020-04-06 RX ORDER — SUCRALFATE 1 G/1
1 TABLET ORAL
Status: DISCONTINUED | OUTPATIENT
Start: 2020-04-06 | End: 2020-04-09

## 2020-04-06 RX ORDER — SODIUM PHOSPHATE, DIBASIC AND SODIUM PHOSPHATE, MONOBASIC 7; 19 G/133ML; G/133ML
1 ENEMA RECTAL ONCE
Status: DISCONTINUED | OUTPATIENT
Start: 2020-04-07 | End: 2020-04-07

## 2020-04-06 RX ADMIN — ASPIRIN 81 MG 81 MG: 81 TABLET ORAL at 08:52

## 2020-04-06 RX ADMIN — INSULIN LISPRO 1 UNITS: 100 INJECTION, SOLUTION INTRAVENOUS; SUBCUTANEOUS at 08:53

## 2020-04-06 RX ADMIN — INSULIN LISPRO 2 UNITS: 100 INJECTION, SOLUTION INTRAVENOUS; SUBCUTANEOUS at 22:24

## 2020-04-06 RX ADMIN — ASPIRIN 81 MG: 81 TABLET, COATED ORAL at 15:30

## 2020-04-06 RX ADMIN — INSULIN LISPRO 1 UNITS: 100 INJECTION, SOLUTION INTRAVENOUS; SUBCUTANEOUS at 17:24

## 2020-04-06 RX ADMIN — ATORVASTATIN CALCIUM 40 MG: 40 TABLET, FILM COATED ORAL at 22:24

## 2020-04-06 RX ADMIN — PANTOPRAZOLE SODIUM 40 MG: 40 TABLET, DELAYED RELEASE ORAL at 08:52

## 2020-04-06 RX ADMIN — SODIUM CHLORIDE: 9 INJECTION, SOLUTION INTRAVENOUS at 10:09

## 2020-04-06 RX ADMIN — CHLORTHALIDONE 25 MG: 25 TABLET ORAL at 15:30

## 2020-04-06 RX ADMIN — PANTOPRAZOLE SODIUM 40 MG: 40 TABLET, DELAYED RELEASE ORAL at 15:39

## 2020-04-06 RX ADMIN — SUCRALFATE 1 G: 1 TABLET ORAL at 15:39

## 2020-04-06 RX ADMIN — LOSARTAN POTASSIUM 100 MG: 100 TABLET, FILM COATED ORAL at 08:52

## 2020-04-06 RX ADMIN — INSULIN GLARGINE 10 UNITS: 100 INJECTION, SOLUTION SUBCUTANEOUS at 22:25

## 2020-04-06 ASSESSMENT — PAIN SCALES - GENERAL
PAINLEVEL_OUTOF10: 0
PAINLEVEL_OUTOF10: 0
PAINLEVEL_OUTOF10: 1

## 2020-04-06 ASSESSMENT — PAIN DESCRIPTION - PROGRESSION
CLINICAL_PROGRESSION: RAPIDLY IMPROVING

## 2020-04-06 NOTE — CONSULTS
Department of Cardiovascular & Thoracic Surgery   Consult Note        Reason for Consult:  CAD  Requesting Physician:  Dr. Noble Degroot Physician: Dr. Martin    Date of Consult: 04/06/20      History Obtained From:  patient, electronic medical record, MD    HISTORY OF PRESENT ILLNESS:    The patient is a  62 y.o. male with h/o DM who presented with new onset constant severe squeezing CP that began yesterday after going to the bathroom. He has a history of esophageal dyskinesia. This pain felt similar but much worse. Was found to have NSTEMI which prompted LHC which was signif for triple vessel disease. Cardiologist requesting consult for evaluation of CAD.     Past Medical History:        Diagnosis Date    Diabetes mellitus ([facility]     Hyperlipidemia     Hypertension      Past Surgical History:        Procedure Laterality Date    ANUS SURGERY      fisure    CHOLECYSTECTOMY      COLONOSCOPY  2008    COLONOSCOPY  11/29/2016    polyps x9, int hem    ENDOSCOPY, COLON, DIAGNOSTIC  11/29/2016    mild reflux esophagitis, non obstructive esophageal ring, hiatal hernia, mild gastritis    FRACTURE SURGERY      left arm    HERNIA REPAIR       Current Medications:   Current Facility-Administered Medications: pantoprazole (PROTONIX) tablet 40 mg, 40 mg, Oral, BID  sucralfate (CARAFATE) tablet 1 g, 1 g, Oral, TID AC  chlorthalidone (HYGROTON) tablet 25 mg, 25 mg, Oral, Daily  sodium chloride flush 0.9 % injection 10 mL, 10 mL, Intravenous, 2 times per day  sodium chloride flush 0.9 % injection 10 mL, 10 mL, Intravenous, PRN  nitroGLYCERIN (NITROSTAT) SL tablet 0.4 mg, 0.4 mg, Sublingual, Q5 Min PRN  aspirin EC tablet 81 mg, 81 mg, Oral, Daily  [START ON 4/7/2020] fleet rectal enema 1 enema, 1 enema, Rectal, Once  albuterol (PROVENTIL) nebulizer solution 2.5 mg, 2.5 mg, Nebulization, Q4H PRN  polyethylene glycol (GLYCOLAX) packet 17 g, 17 g, Oral, Daily PRN  promethazine (PHENERGAN) tablet 12.5 mg, 12.5 mg, Oral, Q6H PRN **OR** ondansetron (ZOFRAN) injection 4 mg, 4 mg, Intravenous, Q6H PRN  atorvastatin (LIPITOR) tablet 40 mg, 40 mg, Oral, Nightly  0.9 % sodium chloride infusion, , Intravenous, Continuous  lidocaine viscous hcl (XYLOCAINE) 2 % solution 15 mL, 15 mL, Mouth/Throat, Q3H PRN  aluminum & magnesium hydroxide-simethicone (MAALOX) 200-200-20 MG/5ML suspension 30 mL, 30 mL, Oral, Q6H PRN  insulin glargine (LANTUS) injection vial 10 Units, 10 Units, Subcutaneous, Nightly  0.9 % sodium chloride infusion, , Intravenous, Continuous  insulin lispro (HUMALOG) injection vial 0-6 Units, 0-6 Units, Subcutaneous, TID WC  insulin lispro (HUMALOG) injection vial 0-3 Units, 0-3 Units, Subcutaneous, Nightly  losartan (COZAAR) tablet 100 mg, 100 mg, Oral, Daily  ketorolac (TORADOL) injection 30 mg, 30 mg, Intravenous, Q6H PRN  Allergies:  Lithium and Naproxen    Social History:   Social History     Socioeconomic History    Marital status:      Spouse name: Not on file    Number of children: Not on file    Years of education: Not on file    Highest education level: Not on file   Occupational History    Not on file   Social Needs    Financial resource strain: Not on file    Food insecurity     Worry: Not on file     Inability: Not on file    Transportation needs     Medical: Not on file     Non-medical: Not on file   Tobacco Use    Smoking status: Former Smoker    Smokeless tobacco: Never Used   Substance and Sexual Activity    Alcohol use: No    Drug use: No    Sexual activity: Not on file   Lifestyle    Physical activity     Days per week: Not on file     Minutes per session: Not on file    Stress: Not on file   Relationships    Social connections     Talks on phone: Not on file     Gets together: Not on file     Attends [employer] service: Not on file     Active member of club or organization: Not on file     Attends meetings of clubs or organizations: Not on file     Relationship status: Not on file   Marylou Norris Intimate partner violence     Fear of current or ex partner: Not on file     Emotionally abused: Not on file     Physically abused: Not on file     Forced sexual activity: Not on file   Other Topics Concern    Not on file   Social History Narrative    Not on file       Family History:  History reviewed. No pertinent family history. REVIEW OF SYSTEMS:   Complete ROS performed and noted as per HPI. PHYSICAL EXAM:  Physical Exam  VITALS:  BP (!) 134/92   Pulse 87   Temp 98.4 °F (36.9 °C) (Oral)   Resp 14   Ht 5' 10\" (1.778 m)   Wt 251 lb 14.4 oz (114.3 kg)   SpO2 97%   BMI 36.14 kg/m²   24HR INTAKE/OUTPUT:      Intake/Output Summary (Last 24 hours) at 4/6/2020 1553  Last data filed at 4/6/2020 1538  Gross per 24 hour   Intake 537 ml   Output 700 ml   Net -163 ml       General appearance: No apparent distress, appears stated age and cooperative. Skin: unremarkable, warm, dry  HEENT Normocephalic, atraumatic without obvious deformity, Conjunctiva normal, EOMI, hearing intact  Neck: Supple, Trachea midline   Lungs: Good respiratory effort.  Clear to auscultation, bilaterally  Heart: Regular rate/ rhythm   Abdomen: Soft, non-tender or non-distended   Extremities: mild edema warm well perfused  Neurologic: Alert, grossly intact, sensation intact  Mental status: flat affect        DATA:  [facility] images reviewed--RCA, LAD, Cx and LM disease, mild hypokinesis  Echo-ant hypokinesis    IMPRESSION  Patient Active Problem List   Diagnosis    Gastroesophageal reflux disease with esophagitis    Esophageal dyskinesia    History of colonic polyps    Gastroesophageal reflux disease without esophagitis    Chest pain    Type 2 diabetes mellitus with circulatory disorder, without long-term current use of insulin (HCC)    NSTEMI (non-ST elevated myocardial infarction) ([address]    Coronary artery disease involving native coronary artery of native heart with angina pectoris ([facility]             RECOMMENDATIONS:    Discussed

## 2020-04-06 NOTE — PROGRESS NOTES
per day    atorvastatin  40 mg Oral Nightly    aspirin  81 mg Oral Daily    insulin glargine  10 Units Subcutaneous Nightly    enoxaparin  1 mg/kg Subcutaneous BID    insulin lispro  0-6 Units Subcutaneous TID     insulin lispro  0-3 Units Subcutaneous Nightly    losartan  100 mg Oral Daily      Infusions:    sodium chloride 75 mL/hr at 04/05/20 2220    sodium chloride 75 mL/hr at 04/05/20 1330     PRN Meds: albuterol, 2.5 mg, Q4H PRN  sodium chloride flush, 10 mL, PRN  polyethylene glycol, 17 g, Daily PRN  promethazine, 12.5 mg, Q6H PRN    Or  ondansetron, 4 mg, Q6H PRN  lidocaine viscous hcl, 15 mL, Q3H PRN  aluminum & magnesium hydroxide-simethicone, 30 mL, Q6H PRN  ketorolac, 30 mg, Q6H PRN        CBC   Recent Labs     04/05/20  0635 04/06/20  0445   WBC 6.1 8.0   HGB 15.4 14.9   HCT 42.9 42.0    261      BMP   Recent Labs     04/05/20  0635   *   K 4.1   CL 97*   CO2 20*   BUN 11   CREATININE 0.8*       Radiology report reviewed     ECG personally reviewed - NSR, TWI V2-V6.       Electronically signed by Maulik Kumari MD on 4/6/2020 at 9:32 AM

## 2020-04-06 NOTE — PROGRESS NOTES
muscles, diaphragm movement is normal  Cardiovascular: (PMI) apex non displaced,no lifts no thrills, no s3,no s4, Normal heart rate, Normal rhythm, No murmurs, No rubs, No gallops. Carotid arteries pulse and amplitude are normal no bruit, no abdominal bruit noted ( normal abdominal aorta ausculation), femoral arteries pulse and amplitude are normal no bruit, pedal pulses are normal  GI:  Bowel sounds normal, Soft, No tenderness, No masses, No pulsatile masses. : External genitalia appear normal, No masses or lesions. No discharge. No CVA tenderness. Musculoskeletal:  Intact distal pulses, No edema, No tenderness, No cyanosis, No clubbing. Good range of motion in all major joints. No tenderness to palpation or major deformities noted. Back- No tenderness. Integument:  Warm, Dry, No erythema, No rash. Lymphatic:  No lymphadenopathy noted. Neurologic:  Alert & oriented x 3, Normal motor function, Normal sensory function, No focal deficits noted.    Psychiatric:  Affect normal, Judgment normal, Mood normal.     Medications:    pantoprazole  40 mg Oral BID    sucralfate  1 g Oral TID AC    chlorthalidone  25 mg Oral Daily    sodium chloride flush  10 mL Intravenous 2 times per day    atorvastatin  40 mg Oral Nightly    aspirin  81 mg Oral Daily    insulin glargine  10 Units Subcutaneous Nightly    enoxaparin  1 mg/kg Subcutaneous BID    insulin lispro  0-6 Units Subcutaneous TID WC    insulin lispro  0-3 Units Subcutaneous Nightly    losartan  100 mg Oral Daily      sodium chloride 75 mL/hr at 04/06/20 1009    sodium chloride 75 mL/hr at 04/05/20 1330     albuterol, sodium chloride flush, polyethylene glycol, promethazine **OR** ondansetron, lidocaine viscous hcl, aluminum & magnesium hydroxide-simethicone, ketorolac    Lab Data:  CBC:   Recent Labs     04/05/20  0635 04/06/20  0445   WBC 6.1 8.0   HGB 15.4 14.9   HCT 42.9 42.0   MCV 88.8 88.6    261     BMP:   Recent Labs

## 2020-04-06 NOTE — CONSULTS
[facility]  Gastroenterology Consultation    2020  9:20 AM    Patient:    Mai Lam  : 1955   62 y.o. MRN: [id number]  Admitted: 2020  6:10 AM ATT: Annalise Jarrett MD   [id number]  AdmitDx: Chest pain [R07.9]  Chest pain, unspecified type [R07.9]  PCP: No primary care provider on file. Reason for Consult:  Esophageal dyskinesia     Requesting Physician:  Annalise Jarrett MD      History Obtained From:  Patient and review of all records    HISTORY OF PRESENT ILLNESS:                The patient is a 62 y.o. male with significant   Past Medical History:   Diagnosis Date    Diabetes mellitus ([facility] [address]     Hyperlipidemia     Hypertension     who presented to [facility] ED due to new onset chest pain that began 0400 . Reports eating spinach, baked potato with butter and ribeye steak for dinner night prior . No history of dysphagia. Denies belching, heartburn. Patient was belching during exam today. History of taking Protonix 40 mg and Zantac 300 mg both hs. Drinks three liters soda daily   Denies abdominal pain  Denies N/V   Last BM yesterday, no gross blood    Chest x-ray    Possible developing right basilar pneumonia.  Short-term follow-up   radiographs are recommended.      History of EGD per Dr. Sebas Vera     ASA 81 mg daily  Denies NSAIDs  NO Anti-platelet therapy     Non-smoker  Denies alcohol intake    Past Medical History:        Diagnosis Date    Diabetes mellitus ([facility] [address]     Hyperlipidemia     Hypertension        Past Surgical History:        Procedure Laterality Date    ANUS SURGERY      fisure    CHOLECYSTECTOMY      COLONOSCOPY      COLONOSCOPY  2016    polyps x9, int hem    ENDOSCOPY, COLON, DIAGNOSTIC  2016    mild reflux esophagitis, non obstructive esophageal ring, hiatal hernia, mild gastritis    FRACTURE SURGERY      left arm    HERNIA REPAIR           Current Medications:    Medications    Scheduled Medications:    pantoprazole  40 mg Oral Daily    sodium chloride flush  10 mL Intravenous 2 times per day    atorvastatin  40 mg Oral Nightly    aspirin  81 mg Oral Daily    insulin glargine  10 Units Subcutaneous Nightly    enoxaparin  1 mg/kg Subcutaneous BID    insulin lispro  0-6 Units Subcutaneous TID WC    insulin lispro  0-3 Units Subcutaneous Nightly    losartan  100 mg Oral Daily     PRN Medications: albuterol, sodium chloride flush, polyethylene glycol, promethazine **OR** ondansetron, lidocaine viscous hcl, aluminum & magnesium hydroxide-simethicone, ketorolac      Allergies:  Vicodin [hydrocodone-acetaminophen] and Percocet [oxycodone-acetaminophen]    Social History:   TOBACCO:   reports that he has quit smoking. He has never used smokeless tobacco.  ETOH:   reports no history of alcohol use. Family History:       Problem Relation Age of Onset    Diabetes Mother     Heart Disease Mother     Heart Disease Father     Cancer Father     Cancer Brother             REVIEW OF SYSTEMS:    The positive ROS will be identified in bold, otherwise ROS are negative     CONSTITUTIONAL:  Neg   Recent weight changes, fatigue, fever, chills or night sweats  EYES:  Neg  Blurriness, earing, itching or acute change in vision  EARS:  Neg  hearing loss, tinnitus, vertigo, discharge or earache. NOSE:  Neg  Rhinorrhea, sneezing, itching, allergy or epistaxis  MOUTH/THROAT:  Neg  bleeding gums, hoarseness or sore throat. RESPIRATORY:   Neg SOB, wheeze, cough, sputum, hemoptysis or bronchitis  CARDIOVASCULAR:  Neg palpitations, dyspnea on exertion, orthopnea, paroxysmal nocturnal dyspnea or edema, + chest pain   GASTROINTESTINAL:  SEE HPI  GENITOURINARY:  Neg  Urinary frequency, hesitancy, urgency, polyuria, dysuria, hematuria, nocturia, or incontinence.   HEMATOLOGIC/LYMPHATIC:  Neg  Anemia, bleeding tendency  MUSCULOSKELETAL:    New myalgias, bone pain, joint pain, swelling or stiffness and has had change in LIPASE, AMYLASE in the last 72 hours. No results for input(s): PROTIME, INR in the last 72 hours. No results for input(s): PTT in the last 72 hours. Lipids: No results for input(s): CHOL, HDL in the last 72 hours. Invalid input(s): LDLCALCU  INR: No results for input(s): INR in the last 72 hours. TSH: No results found for: TSH  No intake or output data in the 24 hours ending 04/06/20 0920   sodium chloride 75 mL/hr at 04/05/20 2220    sodium chloride 75 mL/hr at 04/05/20 1330       I    May have reflux esophagitis  Agree with PPI BID  Treat symptomatically  EGD and possible esophageal manometey6 as OP    I have seen and examined this patient personally, and independently of the nurse practitioner. The plan was developed mutually at the time of the visit with the patient. Andrez Espinal and myself have spoken with patient, nursing staff and provided written and verbal instructions . The above note has been reviewed and I agree with the Assessment,  Diagnosis, and Treatment plan as suggested by Bree Ingram MD  [facility] gastro[facility]ologym[facility] Studies:    Reviewed     IMPRESSION:      Patient Active Problem List   Diagnosis Code    Gastroesophageal reflux disease with esophagitis K21.0    Esophageal dyskinesia K22.4    History of colonic polyps Z86.010    Gastroesophageal reflux disease without esophagitis K21.9    Chest pain R07.9       ASSESSMENT/RECOMMENDATIONS:    Concern for esophageal dyskinesia:  Patient denies dysphagia at this time. Hx of esophageal dyskinesia per EMR. Patient currently has chest pain.  He drinks at least 3 liters of soda per day and states \"I will not stop drinking soda, water makes me nauseous\"  Will treat symptomatically at this time  Recommend Protonix 40 mg BID  Recommend Carafate 1 gram TID  Will plan EGD in near future outpatient with possible esophageal manometry    Nutrition:  Recommend carb control diet  Advised to reduce soda

## 2020-04-07 ENCOUNTER — ANESTHESIA EVENT (OUTPATIENT)
Dept: OPERATING ROOM | Age: 65
DRG: 234 | End: 2020-04-07
Payer: OTHER GOVERNMENT

## 2020-04-07 LAB
CULTURE: NORMAL
GLUCOSE BLD-MCNC: 184 MG/DL (ref 70–99)
GLUCOSE BLD-MCNC: 192 MG/DL (ref 70–99)
GLUCOSE BLD-MCNC: 223 MG/DL (ref 70–99)
GLUCOSE BLD-MCNC: 240 MG/DL (ref 70–99)
Lab: NORMAL
SPECIMEN: NORMAL

## 2020-04-07 PROCEDURE — 2000000000 HC ICU R&B

## 2020-04-07 PROCEDURE — 99233 SBSQ HOSP IP/OBS HIGH 50: CPT | Performed by: INTERNAL MEDICINE

## 2020-04-07 PROCEDURE — ~ 87635 HC SO COVID-19 ANY TECHNIQUE NON-CDC

## 2020-04-07 PROCEDURE — U0002 COVID-19 LAB TEST NON-CDC: HCPCS

## 2020-04-07 PROCEDURE — 6370000000 HC RX 637 (ALT 250 FOR IP): Performed by: INTERNAL MEDICINE

## 2020-04-07 PROCEDURE — 6370000000 HC RX 637 (ALT 250 FOR IP): Performed by: SURGERY

## 2020-04-07 PROCEDURE — 6360000002 HC RX W HCPCS: Performed by: INTERNAL MEDICINE

## 2020-04-07 PROCEDURE — 94761 N-INVAS EAR/PLS OXIMETRY MLT: CPT

## 2020-04-07 PROCEDURE — 2580000003 HC RX 258: Performed by: SURGERY

## 2020-04-07 PROCEDURE — 6370000000 HC RX 637 (ALT 250 FOR IP): Performed by: NURSE PRACTITIONER

## 2020-04-07 PROCEDURE — 82962 GLUCOSE BLOOD TEST: CPT

## 2020-04-07 PROCEDURE — 2580000003 HC RX 258: Performed by: INTERNAL MEDICINE

## 2020-04-07 RX ORDER — CARVEDILOL 6.25 MG/1
6.25 TABLET ORAL 2 TIMES DAILY WITH MEALS
Status: DISCONTINUED | OUTPATIENT
Start: 2020-04-07 | End: 2020-04-09

## 2020-04-07 RX ORDER — CARVEDILOL 3.12 MG/1
3.12 TABLET ORAL ONCE
Status: COMPLETED | OUTPATIENT
Start: 2020-04-09 | End: 2020-04-09

## 2020-04-07 RX ORDER — SODIUM PHOSPHATE, DIBASIC AND SODIUM PHOSPHATE, MONOBASIC 7; 19 G/133ML; G/133ML
1 ENEMA RECTAL ONCE
Status: COMPLETED | OUTPATIENT
Start: 2020-04-08 | End: 2020-04-08

## 2020-04-07 RX ORDER — CEFAZOLIN SODIUM 2 G/50ML
2 SOLUTION INTRAVENOUS
Status: DISCONTINUED | OUTPATIENT
Start: 2020-04-08 | End: 2020-04-07

## 2020-04-07 RX ORDER — CARVEDILOL 3.12 MG/1
3.12 TABLET ORAL ONCE
Status: DISCONTINUED | OUTPATIENT
Start: 2020-04-07 | End: 2020-04-07

## 2020-04-07 RX ORDER — CHLORHEXIDINE GLUCONATE 0.12 MG/ML
30 RINSE ORAL SEE ADMIN INSTRUCTIONS
Status: DISCONTINUED | OUTPATIENT
Start: 2020-04-07 | End: 2020-04-07

## 2020-04-07 RX ORDER — CHLORHEXIDINE GLUCONATE 4 G/100ML
SOLUTION TOPICAL SEE ADMIN INSTRUCTIONS
Status: COMPLETED | OUTPATIENT
Start: 2020-04-08 | End: 2020-04-09

## 2020-04-07 RX ORDER — CHLORHEXIDINE GLUCONATE 0.12 MG/ML
30 RINSE ORAL SEE ADMIN INSTRUCTIONS
Status: COMPLETED | OUTPATIENT
Start: 2020-04-08 | End: 2020-04-09

## 2020-04-07 RX ORDER — CHLORHEXIDINE GLUCONATE 4 G/100ML
SOLUTION TOPICAL SEE ADMIN INSTRUCTIONS
Status: DISCONTINUED | OUTPATIENT
Start: 2020-04-07 | End: 2020-04-07

## 2020-04-07 RX ORDER — CEFAZOLIN SODIUM 2 G/50ML
2 SOLUTION INTRAVENOUS
Status: DISCONTINUED | OUTPATIENT
Start: 2020-04-09 | End: 2020-04-09 | Stop reason: HOSPADM

## 2020-04-07 RX ADMIN — PANTOPRAZOLE SODIUM 40 MG: 40 TABLET, DELAYED RELEASE ORAL at 06:19

## 2020-04-07 RX ADMIN — PROMETHAZINE HYDROCHLORIDE 12.5 MG: 25 TABLET ORAL at 06:23

## 2020-04-07 RX ADMIN — SUCRALFATE 1 G: 1 TABLET ORAL at 06:19

## 2020-04-07 RX ADMIN — PANTOPRAZOLE SODIUM 40 MG: 40 TABLET, DELAYED RELEASE ORAL at 17:20

## 2020-04-07 RX ADMIN — CARVEDILOL 6.25 MG: 6.25 TABLET, FILM COATED ORAL at 17:21

## 2020-04-07 RX ADMIN — INSULIN GLARGINE 10 UNITS: 100 INJECTION, SOLUTION SUBCUTANEOUS at 22:24

## 2020-04-07 RX ADMIN — INSULIN LISPRO 1 UNITS: 100 INJECTION, SOLUTION INTRAVENOUS; SUBCUTANEOUS at 12:09

## 2020-04-07 RX ADMIN — LOSARTAN POTASSIUM 100 MG: 100 TABLET, FILM COATED ORAL at 10:40

## 2020-04-07 RX ADMIN — INSULIN LISPRO 2 UNITS: 100 INJECTION, SOLUTION INTRAVENOUS; SUBCUTANEOUS at 17:14

## 2020-04-07 RX ADMIN — SUCRALFATE 1 G: 1 TABLET ORAL at 10:36

## 2020-04-07 RX ADMIN — KETOROLAC TROMETHAMINE 30 MG: 30 INJECTION, SOLUTION INTRAMUSCULAR at 13:52

## 2020-04-07 RX ADMIN — SUCRALFATE 1 G: 1 TABLET ORAL at 17:21

## 2020-04-07 RX ADMIN — ATORVASTATIN CALCIUM 40 MG: 40 TABLET, FILM COATED ORAL at 20:09

## 2020-04-07 RX ADMIN — SODIUM CHLORIDE, PRESERVATIVE FREE 10 ML: 5 INJECTION INTRAVENOUS at 20:09

## 2020-04-07 RX ADMIN — INSULIN LISPRO 1 UNITS: 100 INJECTION, SOLUTION INTRAVENOUS; SUBCUTANEOUS at 22:24

## 2020-04-07 RX ADMIN — SODIUM CHLORIDE: 9 INJECTION, SOLUTION INTRAVENOUS at 13:53

## 2020-04-07 RX ADMIN — Medication: at 17:23

## 2020-04-07 RX ADMIN — INSULIN LISPRO 1 UNITS: 100 INJECTION, SOLUTION INTRAVENOUS; SUBCUTANEOUS at 08:26

## 2020-04-07 RX ADMIN — SODIUM CHLORIDE: 9 INJECTION, SOLUTION INTRAVENOUS at 00:59

## 2020-04-07 RX ADMIN — ASPIRIN 81 MG: 81 TABLET, COATED ORAL at 10:36

## 2020-04-07 RX ADMIN — CHLORTHALIDONE 25 MG: 25 TABLET ORAL at 10:36

## 2020-04-07 ASSESSMENT — PAIN DESCRIPTION - INTENSITY
RATING_2: 2
RATING_2: 0

## 2020-04-07 ASSESSMENT — PAIN DESCRIPTION - LOCATION
LOCATION: BACK
LOCATION_2: CHEST

## 2020-04-07 ASSESSMENT — PAIN SCALES - GENERAL
PAINLEVEL_OUTOF10: 0
PAINLEVEL_OUTOF10: 8
PAINLEVEL_OUTOF10: 0
PAINLEVEL_OUTOF10: 0

## 2020-04-07 NOTE — ANESTHESIA PRE PROCEDURE
injection 10 mL  10 mL Intravenous PRN Ginny Sales MD        nitroGLYCERIN (NITROSTAT) SL tablet 0.4 mg  0.4 mg Sublingual Q5 Min PRN Ginny Sales MD        aspirin EC tablet 81 mg  81 mg Oral Daily Ginny Sales MD   81 mg at 04/06/20 [address] rectal enema 1 enema  1 enema Rectal Once Ginny Sales MD        albuterol (PROVENTIL) nebulizer solution 2.5 mg  2.5 mg Nebulization Q4H PRN Faustino Garibay MD   2.5 mg at 04/05/20 2059    polyethylene glycol (GLYCOLAX) packet 17 g  17 g Oral Daily PRN Faustino Garibay MD        promethazine (PHENERGAN) tablet 12.5 mg  12.5 mg Oral Q6H PRN Faustino Garibay MD   12.5 mg at 04/07/20 0694    Or    ondansetron (ZOFRAN) injection 4 mg  4 mg Intravenous Q6H PRN Faustino Garibay MD        atorvastatin (LIPITOR) tablet 40 mg  40 mg Oral Nightly Faustino Garibay MD   40 mg at 04/06/20 2224    0.9 % sodium chloride infusion   Intravenous Continuous Grant Valerio MD 75 mL/hr at 04/06/20 1009      lidocaine viscous hcl (XYLOCAINE) 2 % solution 15 mL  15 mL Mouth/Throat Q3H PRN Grant Valerio MD   15 mL at 04/05/20 1356    aluminum & magnesium hydroxide-simethicone (MAALOX) 200-200-20 MG/5ML suspension 30 mL  30 mL Oral Q6H PRN Grant Valerio MD   30 mL at 04/05/20 1355    insulin glargine (LANTUS) injection vial 10 Units  10 Units Subcutaneous Nightly Faustino Garibay MD   10 Units at 04/06/20 2225    0.9 % sodium chloride infusion   Intravenous Continuous Grant Valerio MD 75 mL/hr at 04/07/20 0059      insulin lispro (HUMALOG) injection vial 0-6 Units  0-6 Units Subcutaneous TID WC Faustino Garibay MD   1 Units at 04/06/20 1724    insulin lispro (HUMALOG) injection vial 0-3 Units  0-3 Units Subcutaneous Nightly Faustino Garibay MD   2 Units at 04/06/20 2224    losartan (COZAAR) tablet 100 mg  100 mg Oral Daily Faustino Garibay MD   100 mg at 04/06/20 0852    ketorolac (TORADOL) injection 30 mg  30 mg Intravenous Q6H PRN Grant Valerio MD   30 mg at

## 2020-04-07 NOTE — PROGRESS NOTES
PATIENT NAME: Jose Luis Juares    TODAY'S DATE: 04/07/20    Reason for today's visit: CAD, needs CABG    SUBJECTIVE:    Pt states he had some CP earlier and it was treated with toradol. He is feeling better now. He had SOB at the time. OBJECTIVE:   VITALS:    Vitals:    04/07/20 1152   BP:    Pulse:    Resp:    Temp:    SpO2: 92%     INTAKE/OUTPUT:    Date 04/07/20 0000 - 04/07/20 2359   Shift 8542-8554 6560-4236 0556-1204 24 Hour Total   INTAKE   P.O. 240   240   I. V.(mL/kg/hr) 900(1) 1185  2085   Shift Total(mL/kg) 1140(10.4) 1185(10.8)  7101(75.5)   OUTPUT   Shift Total(mL/kg)       Weight (kg) 109.3 109.3 109.3 109.3      Patient Vitals for the past 96 hrs (Last 3 readings):   Weight   04/07/20 0315 241 lb (109.3 kg)   04/06/20 0242 251 lb 14.4 oz (114.3 kg)   04/05/20 0611 248 lb (112.5 kg)       EXAM:  Constitutional: Blood pressure 112/87, pulse 95, temperature 99.1 °F (37.3 °C), resp. rate 15, height 5' 10\" (1.778 m), weight 241 lb (109.3 kg), SpO2 92 %. No apparent distress, appears stated age and cooperative. Neurologic: follows commands, no focal weakness noted   Lungs: Good respiratory effort.  Clear to auscultation,   CV: Regular rate/ rhythm , no peripheral edema, feet warm and well perfused  GI: Soft, non-tender in all four quadrants, non-distended, + bowel sounds, spleen and liver no palpable masses   : bladder nondistended   MSK: no obvious deformity   Skin: warm, pink and dry       Data:  CBC:   Recent Labs     04/05/20  0635 04/06/20  0445 04/06/20  1729   WBC 6.1 8.0 12.1*   HGB 15.4 14.9 16.5   HCT 42.9 42.0 47.3    261 297     BMP:    Recent Labs     04/05/20  0635 04/06/20  1729   * 135   K 4.1 4.3   CL 97* 101   CO2 20* 20*   BUN 11 10   CREATININE 0.8* 1.0   GLUCOSE 269* 172*     Hepatic:   Recent Labs     04/05/20  0635 04/06/20  1729   AST 5* 32   ALT 5* 22   BILITOT 0.3 1.1*   ALKPHOS 103 113     Mag:      Recent Labs     04/06/20  1729   MG 2.8*      Phos:   No results

## 2020-04-08 ENCOUNTER — ANESTHESIA (OUTPATIENT)
Dept: OPERATING ROOM | Age: 65
DRG: 234 | End: 2020-04-08
Payer: OTHER GOVERNMENT

## 2020-04-08 LAB
GLUCOSE BLD-MCNC: 165 MG/DL (ref 70–99)
GLUCOSE BLD-MCNC: 183 MG/DL (ref 70–99)
GLUCOSE BLD-MCNC: 211 MG/DL (ref 70–99)
GLUCOSE BLD-MCNC: 221 MG/DL (ref 70–99)
SARS-COV-2: NORMAL
SOURCE: NORMAL

## 2020-04-08 PROCEDURE — 82962 GLUCOSE BLOOD TEST: CPT

## 2020-04-08 PROCEDURE — 6370000000 HC RX 637 (ALT 250 FOR IP): Performed by: INTERNAL MEDICINE

## 2020-04-08 PROCEDURE — 94761 N-INVAS EAR/PLS OXIMETRY MLT: CPT

## 2020-04-08 PROCEDURE — 6370000000 HC RX 637 (ALT 250 FOR IP): Performed by: SURGERY

## 2020-04-08 PROCEDURE — 2000000000 HC ICU R&B

## 2020-04-08 PROCEDURE — 2580000003 HC RX 258: Performed by: SURGERY

## 2020-04-08 PROCEDURE — 6370000000 HC RX 637 (ALT 250 FOR IP): Performed by: NURSE PRACTITIONER

## 2020-04-08 RX ORDER — TRAZODONE HYDROCHLORIDE 50 MG/1
50 TABLET ORAL NIGHTLY PRN
Status: DISCONTINUED | OUTPATIENT
Start: 2020-04-08 | End: 2020-04-09

## 2020-04-08 RX ADMIN — INSULIN LISPRO 1 UNITS: 100 INJECTION, SOLUTION INTRAVENOUS; SUBCUTANEOUS at 09:21

## 2020-04-08 RX ADMIN — SODIUM CHLORIDE, PRESERVATIVE FREE 10 ML: 5 INJECTION INTRAVENOUS at 09:14

## 2020-04-08 RX ADMIN — Medication: at 17:45

## 2020-04-08 RX ADMIN — SUCRALFATE 1 G: 1 TABLET ORAL at 17:45

## 2020-04-08 RX ADMIN — SUCRALFATE 1 G: 1 TABLET ORAL at 11:23

## 2020-04-08 RX ADMIN — Medication: at 11:23

## 2020-04-08 RX ADMIN — CHLORHEXIDINE GLUCONATE 0.12% ORAL RINSE 30 ML: 1.2 LIQUID ORAL at 21:53

## 2020-04-08 RX ADMIN — PANTOPRAZOLE SODIUM 40 MG: 40 TABLET, DELAYED RELEASE ORAL at 17:53

## 2020-04-08 RX ADMIN — PANTOPRAZOLE SODIUM 40 MG: 40 TABLET, DELAYED RELEASE ORAL at 09:14

## 2020-04-08 RX ADMIN — SODIUM CHLORIDE, PRESERVATIVE FREE 10 ML: 5 INJECTION INTRAVENOUS at 21:54

## 2020-04-08 RX ADMIN — ATORVASTATIN CALCIUM 40 MG: 40 TABLET, FILM COATED ORAL at 21:52

## 2020-04-08 RX ADMIN — ASPIRIN 81 MG: 81 TABLET, COATED ORAL at 09:14

## 2020-04-08 RX ADMIN — CHLORTHALIDONE 25 MG: 25 TABLET ORAL at 09:14

## 2020-04-08 RX ADMIN — SUCRALFATE 1 G: 1 TABLET ORAL at 09:14

## 2020-04-08 RX ADMIN — CHLORHEXIDINE GLUCONATE: 213 SOLUTION TOPICAL at 23:30

## 2020-04-08 RX ADMIN — Medication: at 21:55

## 2020-04-08 RX ADMIN — CARVEDILOL 6.25 MG: 6.25 TABLET, FILM COATED ORAL at 17:45

## 2020-04-08 RX ADMIN — SODIUM PHOSPHATE, DIBASIC AND SODIUM PHOSPHATE, MONOBASIC 1 ENEMA: 7; 19 ENEMA RECTAL at 23:30

## 2020-04-08 ASSESSMENT — PAIN SCALES - GENERAL
PAINLEVEL_OUTOF10: 0

## 2020-04-08 NOTE — PROGRESS NOTES
Waiting for Covid 19 test results to do CABG. Patient states if he goes home. \" Oh good, I can play Golf tomorrow. Talked to patient regarding fact that he will not be ABLE TO DO ANYTHING STENUOUS  IF Luana. hAS  LESIONS ON THE lad THAT IS NOT SAFE  FOR HIM. pATIENT Nash Clark.

## 2020-04-08 NOTE — PROGRESS NOTES
CVICU Open Heart Risk Factor Score    STS Criteria  Possible Score Yes/No Score Notes   Emergency Case 6 No 0 Mag- 2.8  Ca- 9.0  K- 4.3   Serum Creatinine    0 Cr- 1.0   >1.2 0 No 0    >1.6 to <1.8mg/dl 1 No 0    >.1.9 4 No 0    Acute/Chronic Renal Failure/Renal Insufficiency 0   No 0 Cr- 1.0  GFR- >60   Left Ventricular Dysfunction(EF<40%) 3   No 0 EF- 50%   Operative Mitral Valve Insufficiency 3   No 0    Reoperation 3 No 0    Age >65 and <74 years 1  No   0 Age- 62   Age >75 years 2 No 0    Prior Vascular Surgery 2   No 0    COPD +History of Patient Use of Bronchodilators 2   Yes 2 Home meds- albuterol 108 90base) inhaler PRN   COPD 0 Yes 0 PFT-    Current Smoker of History of Smoking  0   Yes 0 1 PPD for 20 years  Quit 1994   Anemia (Hematocrit<0.34) 2   No 0 Hct- 47.3   ASA / Anticoagulants/ Bleeding Tendencies / Antiplatelets 0   No 0 PT- 11.8  INR- 0.98  APTT- 25.6  PLT- 297  Home meds- ASA 81mg daily   Operative Aortic Valve Stenosis 1 No 0      Weight<143 lbs (  BMI<18.5) 1   No 0 241 lb  109.3 kg  BMI- 34.7  Ht- 70 in   Weight >200 lbs (BMI >30    0   Yes 0    Diabetes Oral or Insulin Therapy       1             Yes           1           HA1C- 8.1  DM Type- DM 2  Home meds- metformin 500mg daily, glipizide 10mg BID   Cerebrovascular Disease 1   No 0    Impaired Mobility 0 No 0    Walker/Cane/Wheelchair 0 No 0    Recent MI 0 Yes 0    Hypertension 0 Yes 0 Home meds- losartan 50mg daily   Peripheral Vascular Disease 0 No 0    Lives Alone 0 No 0 Home w spouse   Other (GI Auto Immune Hepatic etc) 0 Yes 0 -HTN  -hyperlipidemia  -DM   TOTAL   3    Note The surgeon must be notified for all risk scores >7    Notified by Camilla Maddox

## 2020-04-08 NOTE — PROGRESS NOTES
Wife called after talking to her . Wife crying. States patient is telling her he may go home tomorrow if the Covid test is positive, or does bnot come back tomorrow. States is is afraid of him going home. States he will not be good. Wiill not take it easy, and she is afraid he will die. Wife requests  or Cynthia Starr call here so she can talk with them. Cynthia Starr called.   States when we get the test back tomorrow, she will make sure weather the doctors call here, or she will

## 2020-04-08 NOTE — PROGRESS NOTES
PATIENT NAME: Lio Apple    TODAY'S DATE: 04/08/20    Reason for today's visit: CAD, needs CABG    SUBJECTIVE:    Pt c/o CP with deep inspiration. He denies SOB. Denies chest pressure. OBJECTIVE:   VITALS:    Vitals:    04/08/20 1202   BP: (!) 112/91   Pulse: 95   Resp: 18   Temp: 98.7 °F (37.1 °C)   SpO2: 93%     INTAKE/OUTPUT:    Date 04/08/20 0000 - 04/08/20 2359   Shift 7519-5992 2295-2140 2645-6284 24 Hour Total   INTAKE   P.O.  150  150   Shift Total(mL/kg)  150(1.4)  150(1.4)   OUTPUT   Shift Total(mL/kg)       Weight (kg) 109.3 109.3 109.3 109.3      Patient Vitals for the past 96 hrs (Last 3 readings):   Weight   04/07/20 0315 241 lb (109.3 kg)   04/06/20 0242 251 lb 14.4 oz (114.3 kg)   04/05/20 0611 248 lb (112.5 kg)       EXAM:  Constitutional: Blood pressure (!) 112/91, pulse 95, temperature 98.7 °F (37.1 °C), temperature source Oral, resp. rate 18, height 5' 10\" (1.778 m), weight 241 lb (109.3 kg), SpO2 93 %. No apparent distress, appears stated age and cooperative. Neurologic: follows commands, no focal weakness noted   Lungs: Good respiratory effort. Clear to auscultation,   CV: Regular rate/ rhythm , no peripheral edema, feet warm and well perfused  GI: Soft, non-tender in all four quadrants, non-distended, + bowel sounds, spleen and liver no palpable masses   : bladder nondistended   MSK: no obvious deformity   Skin: warm, pink and dry       Data:  CBC:   Recent Labs     04/06/20  0445 04/06/20  1729   WBC 8.0 12.1*   HGB 14.9 16.5   HCT 42.0 47.3    297     BMP:    Recent Labs     04/06/20  1729      K 4.3      CO2 20*   BUN 10   CREATININE 1.0   GLUCOSE 172*     Hepatic:   Recent Labs     04/06/20  1729   AST 32   ALT 22   BILITOT 1.1*   ALKPHOS 113     Mag:      Recent Labs     04/06/20  1729   MG 2.8*      Phos:   No results for input(s): PHOS in the last 72 hours.    INR:   Recent Labs     04/06/20  1729   INR 0.98       Radiology Review:  preop testing reviewed no

## 2020-04-09 ENCOUNTER — APPOINTMENT (OUTPATIENT)
Dept: GENERAL RADIOLOGY | Age: 65
DRG: 234 | End: 2020-04-09
Payer: OTHER GOVERNMENT

## 2020-04-09 VITALS
RESPIRATION RATE: 15 BRPM | DIASTOLIC BLOOD PRESSURE: 86 MMHG | OXYGEN SATURATION: 97 % | TEMPERATURE: 98.1 F | SYSTOLIC BLOOD PRESSURE: 111 MMHG

## 2020-04-09 LAB
ANION GAP SERPL CALCULATED.3IONS-SCNC: 9 MMOL/L (ref 4–16)
APTT: 26.4 SECONDS (ref 25.1–37.1)
BASE EXCESS MIXED: 0.1 (ref 0–1.2)
BASE EXCESS MIXED: 0.8 (ref 0–1.2)
BASE EXCESS MIXED: 0.8 (ref 0–1.2)
BASE EXCESS MIXED: 1 (ref 0–1.2)
BASE EXCESS MIXED: 1.4 (ref 0–1.2)
BASE EXCESS MIXED: 1.4 (ref 0–1.2)
BASE EXCESS MIXED: 1.6 (ref 0–1.2)
BASE EXCESS MIXED: 1.7 (ref 0–1.2)
BASE EXCESS MIXED: 2.2 (ref 0–1.2)
BASE EXCESS MIXED: 2.2 (ref 0–1.2)
BASE EXCESS MIXED: 2.6 (ref 0–1.2)
BASE EXCESS MIXED: 3.2 (ref 0–1.2)
BASE EXCESS MIXED: 3.3 (ref 0–1.2)
BASE EXCESS: ABNORMAL (ref 0–3.3)
BUN BLDV-MCNC: 16 MG/DL (ref 6–23)
CALCIUM SERPL-MCNC: 7.9 MG/DL (ref 8.3–10.6)
CHLORIDE BLD-SCNC: 102 MMOL/L (ref 99–110)
CO2: 23 MMOL/L (ref 21–32)
CO2: 23 MMOL/L (ref 21–32)
CO2: 24 MMOL/L (ref 21–32)
CO2: 25 MMOL/L (ref 21–32)
CO2: 25 MMOL/L (ref 21–32)
CO2: 26 MMOL/L (ref 21–32)
CO2: 27 MMOL/L (ref 21–32)
CO2: 28 MMOL/L (ref 21–32)
CO2: 28 MMOL/L (ref 21–32)
CO2: 29 MMOL/L (ref 21–32)
CREAT SERPL-MCNC: 1 MG/DL (ref 0.9–1.3)
GFR AFRICAN AMERICAN: >60 ML/MIN/1.73M2
GFR NON-AFRICAN AMERICAN: >60 ML/MIN/1.73M2
GLUCOSE BLD-MCNC: 117 MG/DL (ref 70–99)
GLUCOSE BLD-MCNC: 131 MG/DL (ref 70–99)
GLUCOSE BLD-MCNC: 141 MG/DL (ref 70–99)
GLUCOSE BLD-MCNC: 141 MG/DL (ref 70–99)
GLUCOSE BLD-MCNC: 145 MG/DL (ref 70–99)
GLUCOSE BLD-MCNC: 145 MG/DL (ref 70–99)
GLUCOSE BLD-MCNC: 147 MG/DL (ref 70–99)
GLUCOSE BLD-MCNC: 149 MG/DL (ref 70–99)
GLUCOSE BLD-MCNC: 149 MG/DL (ref 70–99)
GLUCOSE BLD-MCNC: 151 MG/DL (ref 70–99)
GLUCOSE BLD-MCNC: 154 MG/DL (ref 70–99)
GLUCOSE BLD-MCNC: 163 MG/DL (ref 70–99)
GLUCOSE BLD-MCNC: 175 MG/DL (ref 70–99)
GLUCOSE BLD-MCNC: 189 MG/DL (ref 70–99)
GLUCOSE BLD-MCNC: 212 MG/DL (ref 70–99)
GLUCOSE BLD-MCNC: 95 MG/DL (ref 70–99)
HCO3 ARTERIAL: 21.9 MMOL/L (ref 18–23)
HCO3 ARTERIAL: 22.4 MMOL/L (ref 18–23)
HCO3 ARTERIAL: 23.6 MMOL/L (ref 18–23)
HCO3 ARTERIAL: 23.8 MMOL/L (ref 18–23)
HCO3 ARTERIAL: 24.2 MMOL/L (ref 18–23)
HCO3 ARTERIAL: 24.6 MMOL/L (ref 18–23)
HCO3 ARTERIAL: 24.9 MMOL/L (ref 18–23)
HCO3 ARTERIAL: 24.9 MMOL/L (ref 18–23)
HCO3 ARTERIAL: 25.3 MMOL/L (ref 18–23)
HCO3 ARTERIAL: 25.3 MMOL/L (ref 18–23)
HCO3 ARTERIAL: 25.8 MMOL/L (ref 18–23)
HCO3 ARTERIAL: 26.5 MMOL/L (ref 18–23)
HCO3 ARTERIAL: 27.1 MMOL/L (ref 18–23)
HCO3 ARTERIAL: 27.5 MMOL/L (ref 18–23)
HCT VFR BLD CALC: 33 % (ref 42–52)
HCT VFR BLD CALC: 34 % (ref 42–52)
HCT VFR BLD CALC: 35 % (ref 42–52)
HCT VFR BLD CALC: 35 % (ref 42–52)
HCT VFR BLD CALC: 36 % (ref 42–52)
HCT VFR BLD CALC: 37 % (ref 42–52)
HCT VFR BLD CALC: 37 % (ref 42–52)
HCT VFR BLD CALC: 37.3 % (ref 42–52)
HCT VFR BLD CALC: 38 % (ref 42–52)
HCT VFR BLD CALC: 38 % (ref 42–52)
HCT VFR BLD CALC: 39 % (ref 42–52)
HCT VFR BLD CALC: 43 % (ref 42–52)
HEMOGLOBIN: 11.3 GM/DL (ref 13.5–18)
HEMOGLOBIN: 11.7 GM/DL (ref 13.5–18)
HEMOGLOBIN: 12 GM/DL (ref 13.5–18)
HEMOGLOBIN: 12.1 GM/DL (ref 13.5–18)
HEMOGLOBIN: 12.1 GM/DL (ref 13.5–18)
HEMOGLOBIN: 12.3 GM/DL (ref 13.5–18)
HEMOGLOBIN: 12.3 GM/DL (ref 13.5–18)
HEMOGLOBIN: 12.4 GM/DL (ref 13.5–18)
HEMOGLOBIN: 12.4 GM/DL (ref 13.5–18)
HEMOGLOBIN: 12.5 GM/DL (ref 13.5–18)
HEMOGLOBIN: 12.9 GM/DL (ref 13.5–18)
HEMOGLOBIN: 12.9 GM/DL (ref 13.5–18)
HEMOGLOBIN: 13.2 GM/DL (ref 13.5–18)
HEMOGLOBIN: 13.3 GM/DL (ref 13.5–18)
HEMOGLOBIN: 14.5 GM/DL (ref 13.5–18)
INR BLD: 1.16 INDEX
MAGNESIUM: 2.3 MG/DL (ref 1.8–2.4)
MCH RBC QN AUTO: 32.2 PG (ref 27–31)
MCHC RBC AUTO-ENTMCNC: 35.7 % (ref 32–36)
MCV RBC AUTO: 90.3 FL (ref 78–100)
O2 SATURATION: 100 % (ref 96–97)
O2 SATURATION: 91.7 % (ref 96–97)
O2 SATURATION: 91.9 % (ref 96–97)
O2 SATURATION: 92.6 % (ref 96–97)
O2 SATURATION: 93.4 % (ref 96–97)
O2 SATURATION: 94.5 % (ref 96–97)
O2 SATURATION: 94.9 % (ref 96–97)
O2 SATURATION: 96.3 % (ref 96–97)
O2 SATURATION: 96.4 % (ref 96–97)
O2 SATURATION: 96.6 % (ref 96–97)
O2 SATURATION: 98.5 % (ref 96–97)
O2 SATURATION: 99.9 % (ref 96–97)
PCO2 ARTERIAL: 35.1 MMHG (ref 32–45)
PCO2 ARTERIAL: 38.8 MMHG (ref 32–45)
PCO2 ARTERIAL: 40.6 MMHG (ref 32–45)
PCO2 ARTERIAL: 40.9 MMHG (ref 32–45)
PCO2 ARTERIAL: 42.2 MMHG (ref 32–45)
PCO2 ARTERIAL: 42.2 MMHG (ref 32–45)
PCO2 ARTERIAL: 43.1 MMHG (ref 32–45)
PCO2 ARTERIAL: 45.1 MMHG (ref 32–45)
PCO2 ARTERIAL: 45.3 MMHG (ref 32–45)
PCO2 ARTERIAL: 45.6 MMHG (ref 32–45)
PCO2 ARTERIAL: 47.3 MMHG (ref 32–45)
PCO2 ARTERIAL: 47.3 MMHG (ref 32–45)
PCO2 ARTERIAL: 50.4 MMHG (ref 32–45)
PCO2 ARTERIAL: 55.8 MMHG (ref 32–45)
PDW BLD-RTO: 12.2 % (ref 11.7–14.9)
PH BLOOD: 7.25 (ref 7.34–7.45)
PH BLOOD: 7.31 (ref 7.34–7.45)
PH BLOOD: 7.32 (ref 7.34–7.45)
PH BLOOD: 7.33 (ref 7.34–7.45)
PH BLOOD: 7.33 (ref 7.34–7.45)
PH BLOOD: 7.35 (ref 7.34–7.45)
PH BLOOD: 7.36 (ref 7.34–7.45)
PH BLOOD: 7.37 (ref 7.34–7.45)
PH BLOOD: 7.37 (ref 7.34–7.45)
PH BLOOD: 7.38 (ref 7.34–7.45)
PH BLOOD: 7.4 (ref 7.34–7.45)
PH BLOOD: 7.41 (ref 7.34–7.45)
PH BLOOD: 7.42 (ref 7.34–7.45)
PH BLOOD: 7.43 (ref 7.34–7.45)
PLATELET # BLD: 218 K/CU MM (ref 140–440)
PMV BLD AUTO: 10 FL (ref 7.5–11.1)
PO2 ARTERIAL: 126.1 MMHG (ref 75–100)
PO2 ARTERIAL: 287.1 MMHG (ref 75–100)
PO2 ARTERIAL: 456.1 MMHG (ref 75–100)
PO2 ARTERIAL: 465.4 MMHG (ref 75–100)
PO2 ARTERIAL: 495.8 MMHG (ref 75–100)
PO2 ARTERIAL: 61.4 MMHG (ref 75–100)
PO2 ARTERIAL: 70.7 MMHG (ref 75–100)
PO2 ARTERIAL: 74.7 MMHG (ref 75–100)
PO2 ARTERIAL: 74.8 MMHG (ref 75–100)
PO2 ARTERIAL: 75.9 MMHG (ref 75–100)
PO2 ARTERIAL: 79.4 MMHG (ref 75–100)
PO2 ARTERIAL: 82.1 MMHG (ref 75–100)
PO2 ARTERIAL: 87.4 MMHG (ref 75–100)
PO2 ARTERIAL: 93.4 MMHG (ref 75–100)
POC ACT PLUS: 105 SEC
POC ACT PLUS: 110 SEC
POC ACT PLUS: 372 SEC
POC ACT PLUS: 380 SEC
POC ACT PLUS: 395 SEC
POC ACT PLUS: 451 SEC
POC ACT PLUS: 496 SEC
POC CALCIUM: 1.13 MMOL/L (ref 1.12–1.32)
POC CALCIUM: 1.13 MMOL/L (ref 1.12–1.32)
POC CALCIUM: 1.17 MMOL/L (ref 1.12–1.32)
POC CALCIUM: 1.2 MMOL/L (ref 1.12–1.32)
POC CALCIUM: 1.2 MMOL/L (ref 1.12–1.32)
POC CALCIUM: 1.21 MMOL/L (ref 1.12–1.32)
POC CALCIUM: 1.21 MMOL/L (ref 1.12–1.32)
POC CALCIUM: 1.22 MMOL/L (ref 1.12–1.32)
POC CALCIUM: 1.25 MMOL/L (ref 1.12–1.32)
POC CALCIUM: 1.27 MMOL/L (ref 1.12–1.32)
POC CALCIUM: 1.28 MMOL/L (ref 1.12–1.32)
POC CALCIUM: 1.51 MMOL/L (ref 1.12–1.32)
POC CHLORIDE: 100 MMOL/L (ref 98–109)
POC CHLORIDE: 101 MMOL/L (ref 98–109)
POC CHLORIDE: 102 MMOL/L (ref 98–109)
POC CHLORIDE: 103 MMOL/L (ref 98–109)
POC CHLORIDE: 104 MMOL/L (ref 98–109)
POC CHLORIDE: 104 MMOL/L (ref 98–109)
POC CHLORIDE: 105 MMOL/L (ref 98–109)
POC CHLORIDE: 96 MMOL/L (ref 98–109)
POC CHLORIDE: 97 MMOL/L (ref 98–109)
POC CREATININE: 0.9 MG/DL (ref 0.9–1.3)
POC CREATININE: 1 MG/DL (ref 0.9–1.3)
POC CREATININE: 1.1 MG/DL (ref 0.9–1.3)
POC CREATININE: 1.2 MG/DL (ref 0.9–1.3)
POC CREATININE: 1.3 MG/DL (ref 0.9–1.3)
POC CREATININE: 1.4 MG/DL (ref 0.9–1.3)
POTASSIUM SERPL-SCNC: 3.3 MMOL/L (ref 3.5–4.5)
POTASSIUM SERPL-SCNC: 3.4 MMOL/L (ref 3.5–4.5)
POTASSIUM SERPL-SCNC: 3.4 MMOL/L (ref 3.5–5.1)
POTASSIUM SERPL-SCNC: 3.5 MMOL/L (ref 3.5–4.5)
POTASSIUM SERPL-SCNC: 3.6 MMOL/L (ref 3.5–4.5)
POTASSIUM SERPL-SCNC: 3.7 MMOL/L (ref 3.5–4.5)
POTASSIUM SERPL-SCNC: 3.7 MMOL/L (ref 3.5–4.5)
POTASSIUM SERPL-SCNC: 4 MMOL/L (ref 3.5–4.5)
POTASSIUM SERPL-SCNC: 4.3 MMOL/L (ref 3.5–4.5)
POTASSIUM SERPL-SCNC: 4.4 MMOL/L (ref 3.5–4.5)
POTASSIUM SERPL-SCNC: 4.4 MMOL/L (ref 3.5–4.5)
PROTHROMBIN TIME: 14.1 SECONDS (ref 11.7–14.5)
RBC # BLD: 4.13 M/CU MM (ref 4.6–6.2)
SODIUM BLD-SCNC: 134 MMOL/L (ref 135–145)
SODIUM BLD-SCNC: 135 MMOL/L (ref 138–146)
SODIUM BLD-SCNC: 135 MMOL/L (ref 138–146)
SODIUM BLD-SCNC: 136 MMOL/L (ref 138–146)
SODIUM BLD-SCNC: 137 MMOL/L (ref 138–146)
SODIUM BLD-SCNC: 138 MMOL/L (ref 138–146)
SODIUM BLD-SCNC: 139 MMOL/L (ref 138–146)
SODIUM BLD-SCNC: 139 MMOL/L (ref 138–146)
SOURCE, BLOOD GAS: ABNORMAL
WBC # BLD: 22.9 K/CU MM (ref 4–10.5)

## 2020-04-09 PROCEDURE — 83735 ASSAY OF MAGNESIUM: CPT

## 2020-04-09 PROCEDURE — 6370000000 HC RX 637 (ALT 250 FOR IP)

## 2020-04-09 PROCEDURE — 84132 ASSAY OF SERUM POTASSIUM: CPT

## 2020-04-09 PROCEDURE — C1751 CATH, INF, PER/CENT/MIDLINE: HCPCS | Performed by: THORACIC SURGERY (CARDIOTHORACIC VASCULAR SURGERY)

## 2020-04-09 PROCEDURE — 94761 N-INVAS EAR/PLS OXIMETRY MLT: CPT

## 2020-04-09 PROCEDURE — 2580000003 HC RX 258

## 2020-04-09 PROCEDURE — 021209W BYPASS CORONARY ARTERY, THREE ARTERIES FROM AORTA WITH AUTOLOGOUS VENOUS TISSUE, OPEN APPROACH: ICD-10-PCS | Performed by: THORACIC SURGERY (CARDIOTHORACIC VASCULAR SURGERY)

## 2020-04-09 PROCEDURE — 6370000000 HC RX 637 (ALT 250 FOR IP): Performed by: SURGERY

## 2020-04-09 PROCEDURE — 85027 COMPLETE CBC AUTOMATED: CPT

## 2020-04-09 PROCEDURE — P9045 ALBUMIN (HUMAN), 5%, 250 ML: HCPCS

## 2020-04-09 PROCEDURE — P9045 ALBUMIN (HUMAN), 5%, 250 ML: HCPCS | Performed by: NURSE ANESTHETIST, CERTIFIED REGISTERED

## 2020-04-09 PROCEDURE — 6370000000 HC RX 637 (ALT 250 FOR IP): Performed by: PHYSICIAN ASSISTANT

## 2020-04-09 PROCEDURE — 6360000002 HC RX W HCPCS

## 2020-04-09 PROCEDURE — 6360000002 HC RX W HCPCS: Performed by: SURGERY

## 2020-04-09 PROCEDURE — 3600000018 HC SURGERY OHS ADDTL 15MIN: Performed by: THORACIC SURGERY (CARDIOTHORACIC VASCULAR SURGERY)

## 2020-04-09 PROCEDURE — 2720000010 HC SURG SUPPLY STERILE: Performed by: THORACIC SURGERY (CARDIOTHORACIC VASCULAR SURGERY)

## 2020-04-09 PROCEDURE — 2500000003 HC RX 250 WO HCPCS

## 2020-04-09 PROCEDURE — 5A1221Z PERFORMANCE OF CARDIAC OUTPUT, CONTINUOUS: ICD-10-PCS | Performed by: THORACIC SURGERY (CARDIOTHORACIC VASCULAR SURGERY)

## 2020-04-09 PROCEDURE — 7100000000 HC PACU RECOVERY - FIRST 15 MIN

## 2020-04-09 PROCEDURE — 2580000003 HC RX 258: Performed by: THORACIC SURGERY (CARDIOTHORACIC VASCULAR SURGERY)

## 2020-04-09 PROCEDURE — 06BQ4ZZ EXCISION OF LEFT SAPHENOUS VEIN, PERCUTANEOUS ENDOSCOPIC APPROACH: ICD-10-PCS | Performed by: THORACIC SURGERY (CARDIOTHORACIC VASCULAR SURGERY)

## 2020-04-09 PROCEDURE — 3700000000 HC ANESTHESIA ATTENDED CARE: Performed by: THORACIC SURGERY (CARDIOTHORACIC VASCULAR SURGERY)

## 2020-04-09 PROCEDURE — 94640 AIRWAY INHALATION TREATMENT: CPT

## 2020-04-09 PROCEDURE — 2500000003 HC RX 250 WO HCPCS: Performed by: NURSE ANESTHETIST, CERTIFIED REGISTERED

## 2020-04-09 PROCEDURE — 6360000002 HC RX W HCPCS: Performed by: NURSE ANESTHETIST, CERTIFIED REGISTERED

## 2020-04-09 PROCEDURE — 3600000008 HC SURGERY OHS BASE: Performed by: THORACIC SURGERY (CARDIOTHORACIC VASCULAR SURGERY)

## 2020-04-09 PROCEDURE — 85610 PROTHROMBIN TIME: CPT

## 2020-04-09 PROCEDURE — 2580000003 HC RX 258: Performed by: SURGERY

## 2020-04-09 PROCEDURE — 82962 GLUCOSE BLOOD TEST: CPT

## 2020-04-09 PROCEDURE — 02100Z9 BYPASS CORONARY ARTERY, ONE ARTERY FROM LEFT INTERNAL MAMMARY, OPEN APPROACH: ICD-10-PCS | Performed by: THORACIC SURGERY (CARDIOTHORACIC VASCULAR SURGERY)

## 2020-04-09 PROCEDURE — 2580000003 HC RX 258: Performed by: NURSE ANESTHETIST, CERTIFIED REGISTERED

## 2020-04-09 PROCEDURE — 2500000003 HC RX 250 WO HCPCS: Performed by: SURGERY

## 2020-04-09 PROCEDURE — 2709999900 HC NON-CHARGEABLE SUPPLY: Performed by: THORACIC SURGERY (CARDIOTHORACIC VASCULAR SURGERY)

## 2020-04-09 PROCEDURE — C1729 CATH, DRAINAGE: HCPCS | Performed by: THORACIC SURGERY (CARDIOTHORACIC VASCULAR SURGERY)

## 2020-04-09 PROCEDURE — 80048 BASIC METABOLIC PNL TOTAL CA: CPT

## 2020-04-09 PROCEDURE — 6360000002 HC RX W HCPCS: Performed by: THORACIC SURGERY (CARDIOTHORACIC VASCULAR SURGERY)

## 2020-04-09 PROCEDURE — 2000000000 HC ICU R&B

## 2020-04-09 PROCEDURE — 6360000002 HC RX W HCPCS: Performed by: PHYSICIAN ASSISTANT

## 2020-04-09 PROCEDURE — 2580000003 HC RX 258: Performed by: PHYSICIAN ASSISTANT

## 2020-04-09 PROCEDURE — 85730 THROMBOPLASTIN TIME PARTIAL: CPT

## 2020-04-09 PROCEDURE — 71045 X-RAY EXAM CHEST 1 VIEW: CPT

## 2020-04-09 PROCEDURE — 85347 COAGULATION TIME ACTIVATED: CPT

## 2020-04-09 PROCEDURE — 3700000001 HC ADD 15 MINUTES (ANESTHESIA): Performed by: THORACIC SURGERY (CARDIOTHORACIC VASCULAR SURGERY)

## 2020-04-09 PROCEDURE — 37799 UNLISTED PX VASCULAR SURGERY: CPT

## 2020-04-09 RX ORDER — HYDROCODONE BITARTRATE AND ACETAMINOPHEN 5; 325 MG/1; MG/1
2 TABLET ORAL EVERY 4 HOURS PRN
Status: DISCONTINUED | OUTPATIENT
Start: 2020-04-09 | End: 2020-04-13 | Stop reason: HOSPADM

## 2020-04-09 RX ORDER — ALBUTEROL SULFATE 90 UG/1
2 AEROSOL, METERED RESPIRATORY (INHALATION)
Status: DISCONTINUED | OUTPATIENT
Start: 2020-04-09 | End: 2020-04-09

## 2020-04-09 RX ORDER — M-VIT,TX,IRON,MINS/CALC/FOLIC 27MG-0.4MG
1 TABLET ORAL
Status: DISCONTINUED | OUTPATIENT
Start: 2020-04-10 | End: 2020-04-13 | Stop reason: HOSPADM

## 2020-04-09 RX ORDER — CEFAZOLIN SODIUM 2 G/100ML
2 INJECTION, SOLUTION INTRAVENOUS EVERY 8 HOURS
Status: DISCONTINUED | OUTPATIENT
Start: 2020-04-09 | End: 2020-04-11 | Stop reason: ALTCHOICE

## 2020-04-09 RX ORDER — SODIUM CHLORIDE 0.9 % (FLUSH) 0.9 %
10 SYRINGE (ML) INJECTION PRN
Status: DISCONTINUED | OUTPATIENT
Start: 2020-04-09 | End: 2020-04-13 | Stop reason: HOSPADM

## 2020-04-09 RX ORDER — ONDANSETRON 2 MG/ML
4 INJECTION INTRAMUSCULAR; INTRAVENOUS EVERY 8 HOURS PRN
Status: DISCONTINUED | OUTPATIENT
Start: 2020-04-09 | End: 2020-04-13 | Stop reason: HOSPADM

## 2020-04-09 RX ORDER — LIDOCAINE HYDROCHLORIDE 20 MG/ML
INJECTION, SOLUTION INTRAVENOUS PRN
Status: DISCONTINUED | OUTPATIENT
Start: 2020-04-09 | End: 2020-04-09 | Stop reason: SDUPTHER

## 2020-04-09 RX ORDER — SODIUM CHLORIDE 450 MG/100ML
INJECTION, SOLUTION INTRAVENOUS CONTINUOUS
Status: DISCONTINUED | OUTPATIENT
Start: 2020-04-09 | End: 2020-04-13 | Stop reason: HOSPADM

## 2020-04-09 RX ORDER — POLYETHYLENE GLYCOL 3350 17 G/17G
17 POWDER, FOR SOLUTION ORAL DAILY
Status: DISCONTINUED | OUTPATIENT
Start: 2020-04-09 | End: 2020-04-13 | Stop reason: HOSPADM

## 2020-04-09 RX ORDER — METOPROLOL TARTRATE 5 MG/5ML
2.5 INJECTION INTRAVENOUS EVERY 10 MIN PRN
Status: DISCONTINUED | OUTPATIENT
Start: 2020-04-09 | End: 2020-04-13 | Stop reason: HOSPADM

## 2020-04-09 RX ORDER — PROPOFOL 10 MG/ML
INJECTION, EMULSION INTRAVENOUS PRN
Status: DISCONTINUED | OUTPATIENT
Start: 2020-04-09 | End: 2020-04-09 | Stop reason: SDUPTHER

## 2020-04-09 RX ORDER — IPRATROPIUM BROMIDE AND ALBUTEROL SULFATE 2.5; .5 MG/3ML; MG/3ML
1 SOLUTION RESPIRATORY (INHALATION)
Status: DISCONTINUED | OUTPATIENT
Start: 2020-04-09 | End: 2020-04-09 | Stop reason: CLARIF

## 2020-04-09 RX ORDER — MAGNESIUM SULFATE IN WATER 40 MG/ML
2 INJECTION, SOLUTION INTRAVENOUS PRN
Status: DISCONTINUED | OUTPATIENT
Start: 2020-04-09 | End: 2020-04-13 | Stop reason: HOSPADM

## 2020-04-09 RX ORDER — AMIODARONE HYDROCHLORIDE 200 MG/1
200 TABLET ORAL 3 TIMES DAILY
Status: DISPENSED | OUTPATIENT
Start: 2020-04-09 | End: 2020-04-13

## 2020-04-09 RX ORDER — ALBUMIN, HUMAN INJ 5% 5 %
25 SOLUTION INTRAVENOUS PRN
Status: DISCONTINUED | OUTPATIENT
Start: 2020-04-09 | End: 2020-04-13 | Stop reason: HOSPADM

## 2020-04-09 RX ORDER — HEPARIN SODIUM 1000 [USP'U]/ML
INJECTION, SOLUTION INTRAVENOUS; SUBCUTANEOUS PRN
Status: DISCONTINUED | OUTPATIENT
Start: 2020-04-09 | End: 2020-04-09 | Stop reason: SDUPTHER

## 2020-04-09 RX ORDER — FENTANYL CITRATE 0.05 MG/ML
INJECTION, SOLUTION INTRAMUSCULAR; INTRAVENOUS PRN
Status: DISCONTINUED | OUTPATIENT
Start: 2020-04-09 | End: 2020-04-09 | Stop reason: SDUPTHER

## 2020-04-09 RX ORDER — PANTOPRAZOLE SODIUM 40 MG/1
40 TABLET, DELAYED RELEASE ORAL DAILY
Status: DISCONTINUED | OUTPATIENT
Start: 2020-04-09 | End: 2020-04-13 | Stop reason: HOSPADM

## 2020-04-09 RX ORDER — PHENYLEPHRINE HYDROCHLORIDE 10 MG/ML
INJECTION INTRAVENOUS PRN
Status: DISCONTINUED | OUTPATIENT
Start: 2020-04-09 | End: 2020-04-09 | Stop reason: SDUPTHER

## 2020-04-09 RX ORDER — HYDRALAZINE HYDROCHLORIDE 20 MG/ML
5 INJECTION INTRAMUSCULAR; INTRAVENOUS EVERY 5 MIN PRN
Status: DISCONTINUED | OUTPATIENT
Start: 2020-04-09 | End: 2020-04-13 | Stop reason: HOSPADM

## 2020-04-09 RX ORDER — MIDAZOLAM HYDROCHLORIDE 5 MG/ML
INJECTION INTRAMUSCULAR; INTRAVENOUS PRN
Status: DISCONTINUED | OUTPATIENT
Start: 2020-04-09 | End: 2020-04-09 | Stop reason: SDUPTHER

## 2020-04-09 RX ORDER — DEXTROSE MONOHYDRATE 50 MG/ML
100 INJECTION, SOLUTION INTRAVENOUS PRN
Status: DISCONTINUED | OUTPATIENT
Start: 2020-04-09 | End: 2020-04-13 | Stop reason: HOSPADM

## 2020-04-09 RX ORDER — ALBUMIN, HUMAN INJ 5% 5 %
SOLUTION INTRAVENOUS PRN
Status: DISCONTINUED | OUTPATIENT
Start: 2020-04-09 | End: 2020-04-09 | Stop reason: SDUPTHER

## 2020-04-09 RX ORDER — SODIUM CHLORIDE 0.9 % (FLUSH) 0.9 %
10 SYRINGE (ML) INJECTION EVERY 12 HOURS SCHEDULED
Status: DISCONTINUED | OUTPATIENT
Start: 2020-04-09 | End: 2020-04-13 | Stop reason: HOSPADM

## 2020-04-09 RX ORDER — SODIUM PHOSPHATE, DIBASIC AND SODIUM PHOSPHATE, MONOBASIC 7; 19 G/133ML; G/133ML
1 ENEMA RECTAL DAILY PRN
Status: DISCONTINUED | OUTPATIENT
Start: 2020-04-09 | End: 2020-04-13 | Stop reason: HOSPADM

## 2020-04-09 RX ORDER — BISACODYL 10 MG
10 SUPPOSITORY, RECTAL RECTAL DAILY PRN
Status: DISCONTINUED | OUTPATIENT
Start: 2020-04-09 | End: 2020-04-13 | Stop reason: HOSPADM

## 2020-04-09 RX ORDER — VECURONIUM BROMIDE 1 MG/ML
INJECTION, POWDER, LYOPHILIZED, FOR SOLUTION INTRAVENOUS PRN
Status: DISCONTINUED | OUTPATIENT
Start: 2020-04-09 | End: 2020-04-09 | Stop reason: SDUPTHER

## 2020-04-09 RX ORDER — NITROGLYCERIN 20 MG/100ML
10 INJECTION INTRAVENOUS CONTINUOUS PRN
Status: DISCONTINUED | OUTPATIENT
Start: 2020-04-09 | End: 2020-04-13 | Stop reason: HOSPADM

## 2020-04-09 RX ORDER — ASPIRIN 81 MG/1
81 TABLET ORAL DAILY
Status: DISCONTINUED | OUTPATIENT
Start: 2020-04-09 | End: 2020-04-13 | Stop reason: HOSPADM

## 2020-04-09 RX ORDER — ACETAMINOPHEN 325 MG/1
650 TABLET ORAL EVERY 4 HOURS PRN
Status: DISCONTINUED | OUTPATIENT
Start: 2020-04-09 | End: 2020-04-13 | Stop reason: HOSPADM

## 2020-04-09 RX ORDER — FENTANYL CITRATE 50 UG/ML
25 INJECTION, SOLUTION INTRAMUSCULAR; INTRAVENOUS
Status: DISPENSED | OUTPATIENT
Start: 2020-04-09 | End: 2020-04-10

## 2020-04-09 RX ORDER — DEXTROSE MONOHYDRATE 25 G/50ML
12.5 INJECTION, SOLUTION INTRAVENOUS PRN
Status: DISCONTINUED | OUTPATIENT
Start: 2020-04-09 | End: 2020-04-13 | Stop reason: HOSPADM

## 2020-04-09 RX ORDER — SODIUM CHLORIDE 9 MG/ML
INJECTION, SOLUTION INTRAVENOUS CONTINUOUS PRN
Status: DISCONTINUED | OUTPATIENT
Start: 2020-04-09 | End: 2020-04-09 | Stop reason: SDUPTHER

## 2020-04-09 RX ORDER — ATORVASTATIN CALCIUM 20 MG/1
20 TABLET, FILM COATED ORAL NIGHTLY
Status: DISCONTINUED | OUTPATIENT
Start: 2020-04-10 | End: 2020-04-13 | Stop reason: HOSPADM

## 2020-04-09 RX ORDER — KETOROLAC TROMETHAMINE 30 MG/ML
15 INJECTION, SOLUTION INTRAMUSCULAR; INTRAVENOUS EVERY 6 HOURS PRN
Status: DISCONTINUED | OUTPATIENT
Start: 2020-04-09 | End: 2020-04-10

## 2020-04-09 RX ORDER — AMIODARONE HYDROCHLORIDE 200 MG/1
200 TABLET ORAL DAILY
Status: DISCONTINUED | OUTPATIENT
Start: 2020-04-13 | End: 2020-04-13 | Stop reason: HOSPADM

## 2020-04-09 RX ORDER — FUROSEMIDE 10 MG/ML
20 INJECTION INTRAMUSCULAR; INTRAVENOUS
Status: ACTIVE | OUTPATIENT
Start: 2020-04-09 | End: 2020-04-09

## 2020-04-09 RX ORDER — SODIUM CHLORIDE, SODIUM LACTATE, POTASSIUM CHLORIDE, CALCIUM CHLORIDE 600; 310; 30; 20 MG/100ML; MG/100ML; MG/100ML; MG/100ML
INJECTION, SOLUTION INTRAVENOUS CONTINUOUS PRN
Status: DISCONTINUED | OUTPATIENT
Start: 2020-04-09 | End: 2020-04-09 | Stop reason: SDUPTHER

## 2020-04-09 RX ORDER — ALBUTEROL SULFATE 90 UG/1
2 AEROSOL, METERED RESPIRATORY (INHALATION) EVERY 4 HOURS PRN
Status: DISCONTINUED | OUTPATIENT
Start: 2020-04-09 | End: 2020-04-13 | Stop reason: HOSPADM

## 2020-04-09 RX ORDER — NICOTINE POLACRILEX 4 MG
15 LOZENGE BUCCAL PRN
Status: DISCONTINUED | OUTPATIENT
Start: 2020-04-09 | End: 2020-04-13 | Stop reason: HOSPADM

## 2020-04-09 RX ORDER — ONDANSETRON 2 MG/ML
INJECTION INTRAMUSCULAR; INTRAVENOUS PRN
Status: DISCONTINUED | OUTPATIENT
Start: 2020-04-09 | End: 2020-04-09 | Stop reason: SDUPTHER

## 2020-04-09 RX ORDER — POTASSIUM CHLORIDE 29.8 MG/ML
20 INJECTION INTRAVENOUS PRN
Status: DISCONTINUED | OUTPATIENT
Start: 2020-04-09 | End: 2020-04-13 | Stop reason: HOSPADM

## 2020-04-09 RX ORDER — CARVEDILOL 3.12 MG/1
3.12 TABLET ORAL 2 TIMES DAILY
Status: DISCONTINUED | OUTPATIENT
Start: 2020-04-09 | End: 2020-04-13

## 2020-04-09 RX ORDER — EPHEDRINE SULFATE 50 MG/ML
INJECTION INTRAVENOUS PRN
Status: DISCONTINUED | OUTPATIENT
Start: 2020-04-09 | End: 2020-04-09 | Stop reason: SDUPTHER

## 2020-04-09 RX ORDER — PROTAMINE SULFATE 10 MG/ML
INJECTION, SOLUTION INTRAVENOUS PRN
Status: DISCONTINUED | OUTPATIENT
Start: 2020-04-09 | End: 2020-04-09 | Stop reason: SDUPTHER

## 2020-04-09 RX ORDER — HYDROCODONE BITARTRATE AND ACETAMINOPHEN 5; 325 MG/1; MG/1
1 TABLET ORAL EVERY 4 HOURS PRN
Status: DISCONTINUED | OUTPATIENT
Start: 2020-04-09 | End: 2020-04-13 | Stop reason: HOSPADM

## 2020-04-09 RX ORDER — ALBUMIN, HUMAN INJ 5% 5 %
SOLUTION INTRAVENOUS
Status: COMPLETED
Start: 2020-04-09 | End: 2020-04-09

## 2020-04-09 RX ADMIN — MIDAZOLAM 4 MG: 5 INJECTION INTRAMUSCULAR; INTRAVENOUS at 11:59

## 2020-04-09 RX ADMIN — SODIUM CHLORIDE, PRESERVATIVE FREE 10 ML: 5 INJECTION INTRAVENOUS at 09:56

## 2020-04-09 RX ADMIN — FENTANYL CITRATE 25 MCG: 50 INJECTION INTRAMUSCULAR; INTRAVENOUS at 16:25

## 2020-04-09 RX ADMIN — PHENYLEPHRINE HYDROCHLORIDE 100 MCG: 10 INJECTION INTRAVENOUS at 11:38

## 2020-04-09 RX ADMIN — FENTANYL CITRATE 150 MCG: 50 INJECTION, SOLUTION INTRAMUSCULAR; INTRAVENOUS at 11:35

## 2020-04-09 RX ADMIN — CHLORHEXIDINE GLUCONATE: 213 SOLUTION TOPICAL at 09:15

## 2020-04-09 RX ADMIN — VECURONIUM BROMIDE FOR INJECTION 2 MG: 1 INJECTION, POWDER, LYOPHILIZED, FOR SOLUTION INTRAVENOUS at 13:35

## 2020-04-09 RX ADMIN — SODIUM CHLORIDE: 900 INJECTION INTRAVENOUS at 14:58

## 2020-04-09 RX ADMIN — FENTANYL CITRATE 100 MCG: 50 INJECTION, SOLUTION INTRAMUSCULAR; INTRAVENOUS at 15:48

## 2020-04-09 RX ADMIN — CARVEDILOL 3.12 MG: 3.12 TABLET, FILM COATED ORAL at 23:12

## 2020-04-09 RX ADMIN — Medication: at 23:12

## 2020-04-09 RX ADMIN — AMIODARONE HYDROCHLORIDE 200 MG: 200 TABLET ORAL at 23:13

## 2020-04-09 RX ADMIN — EPINEPHRINE 3.33 MCG/MIN: 1 INJECTION, SOLUTION, CONCENTRATE INTRAVENOUS at 14:42

## 2020-04-09 RX ADMIN — SODIUM CHLORIDE, POTASSIUM CHLORIDE, SODIUM LACTATE AND CALCIUM CHLORIDE: 600; 310; 30; 20 INJECTION, SOLUTION INTRAVENOUS at 11:10

## 2020-04-09 RX ADMIN — MIDAZOLAM 4 MG: 5 INJECTION INTRAMUSCULAR; INTRAVENOUS at 12:56

## 2020-04-09 RX ADMIN — ALBUTEROL SULFATE 2 PUFF: 90 AEROSOL, METERED RESPIRATORY (INHALATION) at 22:47

## 2020-04-09 RX ADMIN — VECURONIUM BROMIDE FOR INJECTION 5 MG: 1 INJECTION, POWDER, LYOPHILIZED, FOR SOLUTION INTRAVENOUS at 12:28

## 2020-04-09 RX ADMIN — SODIUM CHLORIDE: 4.5 INJECTION, SOLUTION INTRAVENOUS at 16:28

## 2020-04-09 RX ADMIN — CARVEDILOL 3.12 MG: 3.12 TABLET, FILM COATED ORAL at 05:37

## 2020-04-09 RX ADMIN — AMINOCAPROIC ACID 5 G: 250 INJECTION, SOLUTION INTRAVENOUS at 11:50

## 2020-04-09 RX ADMIN — FENTANYL CITRATE 150 MCG: 50 INJECTION, SOLUTION INTRAMUSCULAR; INTRAVENOUS at 13:27

## 2020-04-09 RX ADMIN — CHLORHEXIDINE GLUCONATE 0.12% ORAL RINSE 30 ML: 1.2 LIQUID ORAL at 05:34

## 2020-04-09 RX ADMIN — HYDROCODONE BITARTRATE AND ACETAMINOPHEN 2 TABLET: 5; 325 TABLET ORAL at 23:13

## 2020-04-09 RX ADMIN — PHENYLEPHRINE HYDROCHLORIDE 200 MCG: 10 INJECTION INTRAVENOUS at 11:41

## 2020-04-09 RX ADMIN — ALBUMIN (HUMAN) 250 ML: 12.5 INJECTION, SOLUTION INTRAVENOUS at 15:02

## 2020-04-09 RX ADMIN — SODIUM CHLORIDE, PRESERVATIVE FREE 10 ML: 5 INJECTION INTRAVENOUS at 23:14

## 2020-04-09 RX ADMIN — FENTANYL CITRATE 300 MCG: 50 INJECTION, SOLUTION INTRAMUSCULAR; INTRAVENOUS at 11:59

## 2020-04-09 RX ADMIN — FENTANYL CITRATE 150 MCG: 50 INJECTION, SOLUTION INTRAMUSCULAR; INTRAVENOUS at 15:54

## 2020-04-09 RX ADMIN — HEPARIN SODIUM 5000 UNITS: 1000 INJECTION INTRAVENOUS; SUBCUTANEOUS at 12:39

## 2020-04-09 RX ADMIN — ONDANSETRON 4 MG: 2 INJECTION INTRAMUSCULAR; INTRAVENOUS at 15:15

## 2020-04-09 RX ADMIN — FENTANYL CITRATE 100 MCG: 50 INJECTION, SOLUTION INTRAMUSCULAR; INTRAVENOUS at 12:38

## 2020-04-09 RX ADMIN — Medication: at 05:36

## 2020-04-09 RX ADMIN — FENTANYL CITRATE 50 MCG: 50 INJECTION, SOLUTION INTRAMUSCULAR; INTRAVENOUS at 11:24

## 2020-04-09 RX ADMIN — PROPOFOL 30 MG: 10 INJECTION, EMULSION INTRAVENOUS at 16:02

## 2020-04-09 RX ADMIN — PROTAMINE SULFATE 400 MG: 10 INJECTION, SOLUTION INTRAVENOUS at 14:52

## 2020-04-09 RX ADMIN — PHENYLEPHRINE HYDROCHLORIDE 200 MCG: 10 INJECTION INTRAVENOUS at 11:44

## 2020-04-09 RX ADMIN — VECURONIUM BROMIDE FOR INJECTION 2 MG: 1 INJECTION, POWDER, LYOPHILIZED, FOR SOLUTION INTRAVENOUS at 14:56

## 2020-04-09 RX ADMIN — SODIUM CHLORIDE: 900 INJECTION INTRAVENOUS at 11:10

## 2020-04-09 RX ADMIN — MIDAZOLAM 2 MG: 5 INJECTION INTRAMUSCULAR; INTRAVENOUS at 11:12

## 2020-04-09 RX ADMIN — CEFAZOLIN SODIUM 2 G: 2 INJECTION, SOLUTION INTRAVENOUS at 16:52

## 2020-04-09 RX ADMIN — EPHEDRINE SULFATE 10 MG: 50 INJECTION INTRAVENOUS at 11:43

## 2020-04-09 RX ADMIN — EPHEDRINE SULFATE 10 MG: 50 INJECTION INTRAVENOUS at 11:55

## 2020-04-09 RX ADMIN — PROPOFOL 100 MG: 10 INJECTION, EMULSION INTRAVENOUS at 11:36

## 2020-04-09 RX ADMIN — VECURONIUM BROMIDE FOR INJECTION 5 MG: 1 INJECTION, POWDER, LYOPHILIZED, FOR SOLUTION INTRAVENOUS at 11:59

## 2020-04-09 RX ADMIN — MIDAZOLAM 2 MG: 5 INJECTION INTRAMUSCULAR; INTRAVENOUS at 13:32

## 2020-04-09 RX ADMIN — PHENYLEPHRINE HYDROCHLORIDE 100 MCG: 10 INJECTION INTRAVENOUS at 11:59

## 2020-04-09 RX ADMIN — ALBUMIN (HUMAN) 25 G: 12.5 INJECTION, SOLUTION INTRAVENOUS at 16:29

## 2020-04-09 RX ADMIN — LIDOCAINE HYDROCHLORIDE 100 MG: 20 INJECTION, SOLUTION INTRAVENOUS at 11:36

## 2020-04-09 RX ADMIN — HEPARIN SODIUM 35000 UNITS: 1000 INJECTION INTRAVENOUS; SUBCUTANEOUS at 12:27

## 2020-04-09 RX ADMIN — ALBUMIN, HUMAN INJ 5% 25 G: 5 SOLUTION at 16:29

## 2020-04-09 ASSESSMENT — PULMONARY FUNCTION TESTS
PIF_VALUE: 26
PIF_VALUE: 27
PIF_VALUE: 0
PIF_VALUE: 0
PIF_VALUE: 17
PIF_VALUE: 23
PIF_VALUE: 1
PIF_VALUE: 19
PIF_VALUE: 25
PIF_VALUE: 0
PIF_VALUE: 17
PIF_VALUE: 14
PIF_VALUE: 0
PIF_VALUE: 24
PIF_VALUE: 23
PIF_VALUE: 0
PIF_VALUE: 23
PIF_VALUE: 19
PIF_VALUE: 26
PIF_VALUE: 0
PIF_VALUE: 27
PIF_VALUE: 20
PIF_VALUE: 0
PIF_VALUE: 19
PIF_VALUE: 1
PIF_VALUE: 26
PIF_VALUE: 0
PIF_VALUE: 23
PIF_VALUE: 0
PIF_VALUE: 0
PIF_VALUE: 26
PIF_VALUE: 0
PIF_VALUE: 21
PIF_VALUE: 16
PIF_VALUE: 0
PIF_VALUE: 0
PIF_VALUE: 1
PIF_VALUE: 26
PIF_VALUE: 0
PIF_VALUE: 21
PIF_VALUE: 26
PIF_VALUE: 3
PIF_VALUE: 27
PIF_VALUE: 2
PIF_VALUE: 0
PIF_VALUE: 1
PIF_VALUE: 28
PIF_VALUE: 19
PIF_VALUE: 26
PIF_VALUE: 27
PIF_VALUE: 20
PIF_VALUE: 0
PIF_VALUE: 0
PIF_VALUE: 21
PIF_VALUE: 5
PIF_VALUE: 25
PIF_VALUE: 26
PIF_VALUE: 11
PIF_VALUE: 1
PIF_VALUE: 0
PIF_VALUE: 0
PIF_VALUE: 26
PIF_VALUE: 0
PIF_VALUE: 0
PIF_VALUE: 28
PIF_VALUE: 23
PIF_VALUE: 0
PIF_VALUE: 1
PIF_VALUE: 0
PIF_VALUE: 19
PIF_VALUE: 0
PIF_VALUE: 21
PIF_VALUE: 18
PIF_VALUE: 0
PIF_VALUE: 24
PIF_VALUE: 17
PIF_VALUE: 0
PIF_VALUE: 17
PIF_VALUE: 0
PIF_VALUE: 26
PIF_VALUE: 0
PIF_VALUE: 0
PIF_VALUE: 26
PIF_VALUE: 0
PIF_VALUE: 26
PIF_VALUE: 0
PIF_VALUE: 1
PIF_VALUE: 26
PIF_VALUE: 18
PIF_VALUE: 0
PIF_VALUE: 23
PIF_VALUE: 1
PIF_VALUE: 18
PIF_VALUE: 27
PIF_VALUE: 0
PIF_VALUE: 26
PIF_VALUE: 0
PIF_VALUE: 27
PIF_VALUE: 21
PIF_VALUE: 26
PIF_VALUE: 1
PIF_VALUE: 20
PIF_VALUE: 0
PIF_VALUE: 27
PIF_VALUE: 2
PIF_VALUE: 21
PIF_VALUE: 0
PIF_VALUE: 2
PIF_VALUE: 0
PIF_VALUE: 20
PIF_VALUE: 25
PIF_VALUE: 1
PIF_VALUE: 29
PIF_VALUE: 20
PIF_VALUE: 0
PIF_VALUE: 1
PIF_VALUE: 17
PIF_VALUE: 26
PIF_VALUE: 1
PIF_VALUE: 0
PIF_VALUE: 22
PIF_VALUE: 19
PIF_VALUE: 27
PIF_VALUE: 20
PIF_VALUE: 19
PIF_VALUE: 0
PIF_VALUE: 21
PIF_VALUE: 22
PIF_VALUE: 18
PIF_VALUE: 22
PIF_VALUE: 17
PIF_VALUE: 11
PIF_VALUE: 1
PIF_VALUE: 27
PIF_VALUE: 0
PIF_VALUE: 23
PIF_VALUE: 28
PIF_VALUE: 17
PIF_VALUE: 22
PIF_VALUE: 19
PIF_VALUE: 21
PIF_VALUE: 19
PIF_VALUE: 3
PIF_VALUE: 26
PIF_VALUE: 21
PIF_VALUE: 0
PIF_VALUE: 27
PIF_VALUE: 15
PIF_VALUE: 0
PIF_VALUE: 26
PIF_VALUE: 26
PIF_VALUE: 18
PIF_VALUE: 27
PIF_VALUE: 21
PIF_VALUE: 21
PIF_VALUE: 0
PIF_VALUE: 20
PIF_VALUE: 15
PIF_VALUE: 20
PIF_VALUE: 0
PIF_VALUE: 20
PIF_VALUE: 20
PIF_VALUE: 21
PIF_VALUE: 26
PIF_VALUE: 25
PIF_VALUE: 15
PIF_VALUE: 0
PIF_VALUE: 0
PIF_VALUE: 17
PIF_VALUE: 0
PIF_VALUE: 17
PIF_VALUE: 16
PIF_VALUE: 1
PIF_VALUE: 1
PIF_VALUE: 3
PIF_VALUE: 0
PIF_VALUE: 19
PIF_VALUE: 18
PIF_VALUE: 26
PIF_VALUE: 0
PIF_VALUE: 0
PIF_VALUE: 3
PIF_VALUE: 20
PIF_VALUE: 25
PIF_VALUE: 1
PIF_VALUE: 0
PIF_VALUE: 21
PIF_VALUE: 0
PIF_VALUE: 19
PIF_VALUE: 0
PIF_VALUE: 26
PIF_VALUE: 21
PIF_VALUE: 19
PIF_VALUE: 25
PIF_VALUE: 1
PIF_VALUE: 4
PIF_VALUE: 27
PIF_VALUE: 0
PIF_VALUE: 21
PIF_VALUE: 26
PIF_VALUE: 1
PIF_VALUE: 0
PIF_VALUE: 0
PIF_VALUE: 20
PIF_VALUE: 2
PIF_VALUE: 26
PIF_VALUE: 0
PIF_VALUE: 1
PIF_VALUE: 20
PIF_VALUE: 19
PIF_VALUE: 1
PIF_VALUE: 17
PIF_VALUE: 23
PIF_VALUE: 21
PIF_VALUE: 0
PIF_VALUE: 1
PIF_VALUE: 25
PIF_VALUE: 20
PIF_VALUE: 18
PIF_VALUE: 1
PIF_VALUE: 1
PIF_VALUE: 3
PIF_VALUE: 21
PIF_VALUE: 18
PIF_VALUE: 27
PIF_VALUE: 19
PIF_VALUE: 0
PIF_VALUE: 0
PIF_VALUE: 25
PIF_VALUE: 21
PIF_VALUE: 0
PIF_VALUE: 0
PIF_VALUE: 20
PIF_VALUE: 1
PIF_VALUE: 26
PIF_VALUE: 21
PIF_VALUE: 26
PIF_VALUE: 29
PIF_VALUE: 28
PIF_VALUE: 0
PIF_VALUE: 1
PIF_VALUE: 32
PIF_VALUE: 1
PIF_VALUE: 0
PIF_VALUE: 28
PIF_VALUE: 15
PIF_VALUE: 2
PIF_VALUE: 1
PIF_VALUE: 1
PIF_VALUE: 23
PIF_VALUE: 19
PIF_VALUE: 23
PIF_VALUE: 20
PIF_VALUE: 0
PIF_VALUE: 0
PIF_VALUE: 27
PIF_VALUE: 22
PIF_VALUE: 21
PIF_VALUE: 3
PIF_VALUE: 19
PIF_VALUE: 0
PIF_VALUE: 0
PIF_VALUE: 1
PIF_VALUE: 0
PIF_VALUE: 25
PIF_VALUE: 0
PIF_VALUE: 17
PIF_VALUE: 21
PIF_VALUE: 21
PIF_VALUE: 26
PIF_VALUE: 0
PIF_VALUE: 25
PIF_VALUE: 0
PIF_VALUE: 0

## 2020-04-09 ASSESSMENT — PAIN DESCRIPTION - PROGRESSION
CLINICAL_PROGRESSION: GRADUALLY WORSENING
CLINICAL_PROGRESSION: GRADUALLY WORSENING
CLINICAL_PROGRESSION: RAPIDLY WORSENING
CLINICAL_PROGRESSION: GRADUALLY WORSENING
CLINICAL_PROGRESSION: GRADUALLY WORSENING

## 2020-04-09 ASSESSMENT — PAIN - FUNCTIONAL ASSESSMENT: PAIN_FUNCTIONAL_ASSESSMENT: PREVENTS OR INTERFERES SOME ACTIVE ACTIVITIES AND ADLS

## 2020-04-09 ASSESSMENT — PAIN SCALES - GENERAL
PAINLEVEL_OUTOF10: 0
PAINLEVEL_OUTOF10: 0
PAINLEVEL_OUTOF10: 7
PAINLEVEL_OUTOF10: 10

## 2020-04-09 ASSESSMENT — PAIN DESCRIPTION - DESCRIPTORS
DESCRIPTORS: DISCOMFORT;DULL
DESCRIPTORS: SORE;ACHING

## 2020-04-09 ASSESSMENT — PAIN DESCRIPTION - PAIN TYPE
TYPE: SURGICAL PAIN
TYPE: SURGICAL PAIN

## 2020-04-09 ASSESSMENT — PAIN DESCRIPTION - ORIENTATION
ORIENTATION: MID
ORIENTATION: ANTERIOR

## 2020-04-09 ASSESSMENT — PAIN DESCRIPTION - ONSET: ONSET: SUDDEN

## 2020-04-09 ASSESSMENT — PAIN DESCRIPTION - FREQUENCY
FREQUENCY: CONTINUOUS
FREQUENCY: INTERMITTENT

## 2020-04-09 ASSESSMENT — PAIN DESCRIPTION - LOCATION: LOCATION: CHEST

## 2020-04-09 NOTE — PROGRESS NOTES
Hospitalist Progress Note      Name:  Malick Lopez /Age/Sex: 1955  (62 y.o. male)   MRN & CSN:  [id number] & [id number] Admission Date/Time: 2020  6:10 AM   Location:  -A PCP: No primary care provider on file. Hospital Day: 5    Assessment and Plan:   Malick Lopez is a 62 y.o.  male  who presents with chest pain and elevated troponin. --- NSTEMI due to severe 3V CAD. LHC 20 showed 90% proximal LAD, 80% midLAD, 90% ostial LCx and 70% OM1 and RCA stenoses. Plan  CABG today at 11 am      COVID19 status negative  Continue  ASA 81 mg and lipitor 40. Will sign off after surgery    --  DM II -  HbA1C 8.1%. continue SQ insulin (basal and SS)  --- HTN - not well controlled. Losartan increased to 100 mg dly. Will also add chlorthalidone. --- H/O esophageal dyskinesia - PPI  --- Obesity - BMI of 35, risk factors heart disease    Diet Diet NPO, After Midnight   DVT Prophylaxis [x] Lovenox, []  Heparin, [] SCDs, [] Ambulation   GI Prophylaxis [x] PPI,  [] H2 Blocker,  [] Carafate,  [] Diet/Tube Feeds   Code Status Full Code   Disposition To be determined. MDM [] Low, [x] Moderate,[]  High     History of Present Illness:   Patient seen and examined. Denies chest pain, has little SOB ,  CABG  Today     Ten point ROS reviewed negative, unless as noted above    Objective: Intake/Output Summary (Last 24 hours) at 2020 0730  Last data filed at 2020 2330  Gross per 24 hour   Intake 250 ml   Output 950 ml   Net -700 ml      Vitals:   Vitals:    20 0700   BP: 98/64   Pulse: 76   Resp: 14   Temp:    SpO2: 98%     Physical Exam:   GEN Awake male, lying in bed. RESP Breathing comfortably on RA. CARDIO/VASC S1/S2 auscultated. Regular rate without appreciable murmurs. No peripheral edema. GI Abdomen is soft without significant tenderness, masses, or guarding. Bowel sounds are normoactive. MSK No gross joint deformities.   SKIN Normal coloration, warm,

## 2020-04-09 NOTE — PROGRESS NOTES
RT Rachael Pouch called to come and wean vent at this time. epoc ABG are good. Pt on precedex gtt at 0.4. Principal Discharge DX:	Pelvic fracture  Secondary Diagnosis:	Lumbar transverse process fracture, closed, initial encounter

## 2020-04-09 NOTE — PROGRESS NOTES
Dr. Migue Dodd called and updated on latest [facility] ABG's. Order received and read back to increase vent to [facility] 14 for 1 hour. RT Hayes called and updated on new order. VS and I & O given to Dr. Migue Dodd. May need lasix but will watch for another hour.

## 2020-04-09 NOTE — ANESTHESIA POSTPROCEDURE EVALUATION
Department of Anesthesiology  Postprocedure Note    Patient: Sheryle Coles  MRN: [id number]  YOB: 1955  Date of evaluation: 4/9/2020  Time:  4:03 PM     Procedure Summary     Date:  04/09/20 Room / Location:  [address]    Anesthesia Start:  1110 Anesthesia Stop:  9674    Procedure:  CABG CORONARY ARTERY BYPASS x4 WITH LIMA, INTRAOP DEVEN, ENDOHARVEST OF THE LEFT SAPHENOUS VEIN (N/A Chest) Diagnosis:  (CAD)    Surgeon:  Guanako Barrow MD Responsible Provider:  ROXANE Mark CRNA    Anesthesia Type:  general ASA Status:  4          Anesthesia Type: general    Andra Phase I:      Andra Phase II:      Last vitals: Reviewed and per EMR flowsheets.        Anesthesia Post Evaluation    Patient location during evaluation: ICU  Patient participation: waiting for patient participation  Level of consciousness: sedated and ventilated  Airway patency: patent  Nausea & Vomiting: no vomiting and no nausea  Complications: no  Cardiovascular status: hemodynamically stable  Respiratory status: acceptable, ventilator and intubated  Hydration status: stable

## 2020-04-09 NOTE — PROGRESS NOTES
Spouse called and can come in earlier for support per manager Germán. Pt hair clipped and new gown applied. Pt has no questions regarding surgery at this time. VSS and call light in reach.

## 2020-04-09 NOTE — FLOWSHEET NOTE
SARS-Cov-2 not detected final result. Droplet plus precaution removed. Confirmed per Yale salas and Dona.

## 2020-04-09 NOTE — PROGRESS NOTES
Pt off to the OR. Spouse in the surgical waiting room. Security code given to the spouse after Bill's permission received. Notified that she cannot visit patient after surgery. Support given.

## 2020-04-09 NOTE — PROGRESS NOTES
Pt arrived from OR. Report received at bedside from Simona López 150 and ESTELA/ Pavan Price 41. epoc labs drawn. VSS on 5 mcg/min of epi and insulin at 6 units per hour. Full assessment to follow.

## 2020-04-09 NOTE — PROGRESS NOTES
PATIENT NAME: Malick Lopez    TODAY'S DATE: 04/09/20    Reason for visit: Unstable angina, ACS    SUBJECTIVE:    Pt is fairly emotional per the nursing staff. He is threatening to leave due to concern for surgery. He spoke with his wife and has no concerns now. No current chest pains    COVID 19 test negative     OBJECTIVE:   VITALS:    Vitals:    04/09/20 0853   BP:    Pulse:    Resp: 16   Temp:    SpO2: 94%     INTAKE/OUTPUT:    Date 04/09/20 0000 - 04/09/20 2359   Shift 9360-3133 5166-9738 2344-3267 24 Hour Total   INTAKE   Shift Total(mL/kg)       OUTPUT   Urine(mL/kg/hr)  600  600   Shift Total(mL/kg)  600(5.5)  600(5.5)   Weight (kg) 109.3 109.3 109.3 109.3        EXAM:  Blood pressure 97/63, pulse 76, temperature 98 °F (36.7 °C), temperature source Oral, resp. rate 16, height 5' 10\" (1.778 m), weight 241 lb (109.3 kg), SpO2 94 %. General appearance: No apparent distress, appears stated age and cooperative. Skin: unremarkable  HEENT Normocephalic, atraumatic without obvious deformity. Neck: Supple, Trachea midline   Lungs: Good respiratory effort. Clear to auscultation, bilaterally  Heart: Regular rate/ rhythm   Abdomen: Soft, non-tender or non-distended, obese  Extremities: No lower extremity edema warm well perfused  Neurologic: Alert, grossly intact  Mental status: normal affect      Data:  CBC:   Recent Labs     04/06/20  1729   WBC 12.1*   HGB 16.5   HCT 47.3        BMP:    Recent Labs     04/06/20  1729      K 4.3      CO2 20*   BUN 10   CREATININE 1.0   GLUCOSE 172*     Hepatic:   Recent Labs     04/06/20  1729   AST 32   ALT 22   BILITOT 1.1*   ALKPHOS 113     Mag:      Recent Labs     04/06/20  1729   MG 2.8*      Phos:   No results for input(s): PHOS in the last 72 hours.    INR:   Recent Labs     04/06/20  1729   INR 0.98     Radiology Review: CT chest and angiogram reviewed      ASSESSMENT AND PLAN:  Unstable angina/ACS    Day of Surgery  Patient Active Problem List

## 2020-04-09 NOTE — PROGRESS NOTES
Dr. Lindia Habermann updated on breathing treatments made prn. Nishant De Leon for now but may need to schedule them if need to. Pt awakening now and following some commands. All 4 extremities moving.

## 2020-04-10 ENCOUNTER — APPOINTMENT (OUTPATIENT)
Dept: GENERAL RADIOLOGY | Age: 65
DRG: 234 | End: 2020-04-10
Payer: OTHER GOVERNMENT

## 2020-04-10 LAB
ANION GAP SERPL CALCULATED.3IONS-SCNC: 10 MMOL/L (ref 4–16)
BASE EXCESS MIXED: 1.6 (ref 0–1.2)
BASE EXCESS: ABNORMAL (ref 0–3.3)
BUN BLDV-MCNC: 21 MG/DL (ref 6–23)
CALCIUM IONIZED: 4.64 MG/DL (ref 4.48–5.28)
CALCIUM SERPL-MCNC: 8 MG/DL (ref 8.3–10.6)
CHLORIDE BLD-SCNC: 97 MMOL/L (ref 99–110)
CO2: 22 MMOL/L (ref 21–32)
CO2: 26 MMOL/L (ref 21–32)
CREAT SERPL-MCNC: 1.3 MG/DL (ref 0.9–1.3)
GFR AFRICAN AMERICAN: >60 ML/MIN/1.73M2
GFR NON-AFRICAN AMERICAN: 56 ML/MIN/1.73M2
GLUCOSE BLD-MCNC: 118 MG/DL (ref 70–99)
GLUCOSE BLD-MCNC: 122 MG/DL (ref 70–99)
GLUCOSE BLD-MCNC: 122 MG/DL (ref 70–99)
GLUCOSE BLD-MCNC: 123 MG/DL (ref 70–99)
GLUCOSE BLD-MCNC: 126 MG/DL (ref 70–99)
GLUCOSE BLD-MCNC: 127 MG/DL (ref 70–99)
GLUCOSE BLD-MCNC: 129 MG/DL (ref 70–99)
GLUCOSE BLD-MCNC: 132 MG/DL (ref 70–99)
GLUCOSE BLD-MCNC: 136 MG/DL (ref 70–99)
GLUCOSE BLD-MCNC: 137 MG/DL (ref 70–99)
GLUCOSE BLD-MCNC: 143 MG/DL (ref 70–99)
GLUCOSE BLD-MCNC: 143 MG/DL (ref 70–99)
GLUCOSE BLD-MCNC: 147 MG/DL (ref 70–99)
GLUCOSE BLD-MCNC: 147 MG/DL (ref 70–99)
GLUCOSE BLD-MCNC: 163 MG/DL (ref 70–99)
GLUCOSE BLD-MCNC: 168 MG/DL (ref 70–99)
GLUCOSE BLD-MCNC: 172 MG/DL (ref 70–99)
HCO3 ARTERIAL: 24.2 MMOL/L (ref 18–23)
HCT VFR BLD CALC: 36.9 % (ref 42–52)
HCT VFR BLD CALC: 37 % (ref 42–52)
HEMOGLOBIN: 12.7 GM/DL (ref 13.5–18)
HEMOGLOBIN: 12.8 GM/DL (ref 13.5–18)
IONIZED CA: 1.16 MMOL/L (ref 1.12–1.32)
MAGNESIUM: 2 MG/DL (ref 1.8–2.4)
MCH RBC QN AUTO: 31.6 PG (ref 27–31)
MCHC RBC AUTO-ENTMCNC: 34.7 % (ref 32–36)
MCV RBC AUTO: 91.1 FL (ref 78–100)
O2 SATURATION: 85.4 % (ref 96–97)
PCO2 ARTERIAL: 43.9 MMHG (ref 32–45)
PDW BLD-RTO: 12.3 % (ref 11.7–14.9)
PH BLOOD: 7.35 (ref 7.34–7.45)
PLATELET # BLD: 257 K/CU MM (ref 140–440)
PMV BLD AUTO: 10.5 FL (ref 7.5–11.1)
PO2 ARTERIAL: 53.3 MMHG (ref 75–100)
POC CALCIUM: 1.2 MMOL/L (ref 1.12–1.32)
POC CHLORIDE: 103 MMOL/L (ref 98–109)
POC CREATININE: 1.5 MG/DL (ref 0.9–1.3)
POTASSIUM SERPL-SCNC: 4.5 MMOL/L (ref 3.5–4.5)
POTASSIUM SERPL-SCNC: 4.5 MMOL/L (ref 3.5–5.1)
RBC # BLD: 4.05 M/CU MM (ref 4.6–6.2)
SODIUM BLD-SCNC: 129 MMOL/L (ref 135–145)
SODIUM BLD-SCNC: 136 MMOL/L (ref 138–146)
SOURCE, BLOOD GAS: ABNORMAL
WBC # BLD: 17.7 K/CU MM (ref 4–10.5)

## 2020-04-10 PROCEDURE — 97116 GAIT TRAINING THERAPY: CPT

## 2020-04-10 PROCEDURE — 82330 ASSAY OF CALCIUM: CPT

## 2020-04-10 PROCEDURE — 84132 ASSAY OF SERUM POTASSIUM: CPT

## 2020-04-10 PROCEDURE — 82962 GLUCOSE BLOOD TEST: CPT

## 2020-04-10 PROCEDURE — 80048 BASIC METABOLIC PNL TOTAL CA: CPT

## 2020-04-10 PROCEDURE — 83735 ASSAY OF MAGNESIUM: CPT

## 2020-04-10 PROCEDURE — 94761 N-INVAS EAR/PLS OXIMETRY MLT: CPT

## 2020-04-10 PROCEDURE — 94150 VITAL CAPACITY TEST: CPT

## 2020-04-10 PROCEDURE — 97530 THERAPEUTIC ACTIVITIES: CPT

## 2020-04-10 PROCEDURE — 71045 X-RAY EXAM CHEST 1 VIEW: CPT

## 2020-04-10 PROCEDURE — 94640 AIRWAY INHALATION TREATMENT: CPT

## 2020-04-10 PROCEDURE — 97162 PT EVAL MOD COMPLEX 30 MIN: CPT

## 2020-04-10 PROCEDURE — 2580000003 HC RX 258: Performed by: PHYSICIAN ASSISTANT

## 2020-04-10 PROCEDURE — 6360000002 HC RX W HCPCS: Performed by: PHYSICIAN ASSISTANT

## 2020-04-10 PROCEDURE — 99233 SBSQ HOSP IP/OBS HIGH 50: CPT | Performed by: INTERNAL MEDICINE

## 2020-04-10 PROCEDURE — 2000000000 HC ICU R&B

## 2020-04-10 PROCEDURE — 97166 OT EVAL MOD COMPLEX 45 MIN: CPT

## 2020-04-10 PROCEDURE — 6370000000 HC RX 637 (ALT 250 FOR IP): Performed by: PHYSICIAN ASSISTANT

## 2020-04-10 PROCEDURE — 2700000000 HC OXYGEN THERAPY PER DAY

## 2020-04-10 PROCEDURE — 97112 NEUROMUSCULAR REEDUCATION: CPT

## 2020-04-10 PROCEDURE — 85027 COMPLETE CBC AUTOMATED: CPT

## 2020-04-10 RX ORDER — FUROSEMIDE 10 MG/ML
20 INJECTION INTRAMUSCULAR; INTRAVENOUS ONCE
Status: COMPLETED | OUTPATIENT
Start: 2020-04-10 | End: 2020-04-10

## 2020-04-10 RX ADMIN — CARVEDILOL 3.12 MG: 3.12 TABLET, FILM COATED ORAL at 21:56

## 2020-04-10 RX ADMIN — POLYETHYLENE GLYCOL (3350) 17 G: 17 POWDER, FOR SOLUTION ORAL at 09:19

## 2020-04-10 RX ADMIN — HYDROCODONE BITARTRATE AND ACETAMINOPHEN 2 TABLET: 5; 325 TABLET ORAL at 09:20

## 2020-04-10 RX ADMIN — CEFAZOLIN SODIUM 2 G: 2 INJECTION, SOLUTION INTRAVENOUS at 19:07

## 2020-04-10 RX ADMIN — SODIUM CHLORIDE 2.8 UNITS/HR: 9 INJECTION, SOLUTION INTRAVENOUS at 11:08

## 2020-04-10 RX ADMIN — FUROSEMIDE 20 MG: 10 INJECTION, SOLUTION INTRAMUSCULAR; INTRAVENOUS at 11:16

## 2020-04-10 RX ADMIN — CEFAZOLIN SODIUM 2 G: 2 INJECTION, SOLUTION INTRAVENOUS at 00:21

## 2020-04-10 RX ADMIN — ATORVASTATIN CALCIUM 20 MG: 20 TABLET, FILM COATED ORAL at 21:56

## 2020-04-10 RX ADMIN — Medication: at 09:21

## 2020-04-10 RX ADMIN — SODIUM CHLORIDE, PRESERVATIVE FREE 10 ML: 5 INJECTION INTRAVENOUS at 09:21

## 2020-04-10 RX ADMIN — CALCIUM GLUCONATE 2 G: 98 INJECTION, SOLUTION INTRAVENOUS at 09:22

## 2020-04-10 RX ADMIN — MULTIPLE VITAMINS W/ MINERALS TAB 1 TABLET: TAB at 11:16

## 2020-04-10 RX ADMIN — HYDROCODONE BITARTRATE AND ACETAMINOPHEN 1 TABLET: 5; 325 TABLET ORAL at 21:55

## 2020-04-10 RX ADMIN — AMIODARONE HYDROCHLORIDE 200 MG: 200 TABLET ORAL at 09:20

## 2020-04-10 RX ADMIN — HYDROCODONE BITARTRATE AND ACETAMINOPHEN 1 TABLET: 5; 325 TABLET ORAL at 15:56

## 2020-04-10 RX ADMIN — SODIUM CHLORIDE: 4.5 INJECTION, SOLUTION INTRAVENOUS at 07:14

## 2020-04-10 RX ADMIN — ASPIRIN 81 MG: 81 TABLET, COATED ORAL at 09:20

## 2020-04-10 RX ADMIN — AMIODARONE HYDROCHLORIDE 200 MG: 200 TABLET ORAL at 21:56

## 2020-04-10 RX ADMIN — SODIUM CHLORIDE, PRESERVATIVE FREE 10 ML: 5 INJECTION INTRAVENOUS at 21:57

## 2020-04-10 RX ADMIN — AMIODARONE HYDROCHLORIDE 200 MG: 200 TABLET ORAL at 13:55

## 2020-04-10 RX ADMIN — CEFAZOLIN SODIUM 2 G: 2 INJECTION, SOLUTION INTRAVENOUS at 09:19

## 2020-04-10 RX ADMIN — PANTOPRAZOLE SODIUM 40 MG: 40 TABLET, DELAYED RELEASE ORAL at 09:20

## 2020-04-10 RX ADMIN — ALBUTEROL SULFATE 2 PUFF: 90 AEROSOL, METERED RESPIRATORY (INHALATION) at 22:49

## 2020-04-10 RX ADMIN — HYDROCODONE BITARTRATE AND ACETAMINOPHEN 2 TABLET: 5; 325 TABLET ORAL at 03:25

## 2020-04-10 RX ADMIN — Medication: at 21:56

## 2020-04-10 ASSESSMENT — PAIN SCALES - GENERAL
PAINLEVEL_OUTOF10: 5
PAINLEVEL_OUTOF10: 2
PAINLEVEL_OUTOF10: 0
PAINLEVEL_OUTOF10: 8
PAINLEVEL_OUTOF10: 4
PAINLEVEL_OUTOF10: 5
PAINLEVEL_OUTOF10: 0
PAINLEVEL_OUTOF10: 2
PAINLEVEL_OUTOF10: 0
PAINLEVEL_OUTOF10: 0
PAINLEVEL_OUTOF10: 7
PAINLEVEL_OUTOF10: 4

## 2020-04-10 ASSESSMENT — PAIN - FUNCTIONAL ASSESSMENT
PAIN_FUNCTIONAL_ASSESSMENT: ACTIVITIES ARE NOT PREVENTED
PAIN_FUNCTIONAL_ASSESSMENT: PREVENTS OR INTERFERES SOME ACTIVE ACTIVITIES AND ADLS
PAIN_FUNCTIONAL_ASSESSMENT: PREVENTS OR INTERFERES SOME ACTIVE ACTIVITIES AND ADLS
PAIN_FUNCTIONAL_ASSESSMENT: ACTIVITIES ARE NOT PREVENTED

## 2020-04-10 ASSESSMENT — PAIN DESCRIPTION - PAIN TYPE
TYPE: SURGICAL PAIN

## 2020-04-10 ASSESSMENT — PAIN DESCRIPTION - DESCRIPTORS
DESCRIPTORS: SORE
DESCRIPTORS: ACHING;SORE
DESCRIPTORS: SORE
DESCRIPTORS: DISCOMFORT;SORE
DESCRIPTORS: DISCOMFORT
DESCRIPTORS: ACHING;SORE
DESCRIPTORS: SORE

## 2020-04-10 ASSESSMENT — PAIN DESCRIPTION - PROGRESSION
CLINICAL_PROGRESSION: GRADUALLY IMPROVING
CLINICAL_PROGRESSION: GRADUALLY WORSENING
CLINICAL_PROGRESSION: GRADUALLY IMPROVING
CLINICAL_PROGRESSION: GRADUALLY WORSENING

## 2020-04-10 ASSESSMENT — PAIN DESCRIPTION - LOCATION
LOCATION: STERNUM
LOCATION: STERNUM
LOCATION: CHEST
LOCATION: STERNUM

## 2020-04-10 ASSESSMENT — PAIN DESCRIPTION - FREQUENCY
FREQUENCY: INTERMITTENT
FREQUENCY: INTERMITTENT
FREQUENCY: CONTINUOUS
FREQUENCY: INTERMITTENT
FREQUENCY: CONTINUOUS

## 2020-04-10 ASSESSMENT — PAIN DESCRIPTION - ONSET
ONSET: GRADUAL
ONSET: ON-GOING
ONSET: GRADUAL
ONSET: PROGRESSIVE
ONSET: GRADUAL
ONSET: GRADUAL

## 2020-04-10 ASSESSMENT — PAIN DESCRIPTION - ORIENTATION
ORIENTATION: MID

## 2020-04-10 NOTE — PROGRESS NOTES
--    CREATININE 1.0  1.2   < > 1.3 1.3 1.5*   GLUCOSE 145*  --   --  122*  --     < > = values in this interval not displayed. Hepatic: No results for input(s): AST, ALT, ALB, BILITOT, ALKPHOS in the last 72 hours. Mag:      Recent Labs     04/09/20  1600 04/10/20  0515   MG 2.3 2.0      Phos:   No results for input(s): PHOS in the last 72 hours. INR:   Recent Labs     04/09/20  1600   INR 1.16       Radiology Review:  CXR  Impression:     Status post extubation with persistent bibasilar atelectasis. ASSESSMENT AND PLAN:    Patient Active Problem List   Diagnosis    Gastroesophageal reflux disease with esophagitis    Esophageal dyskinesia    History of colonic polyps    Gastroesophageal reflux disease without esophagitis    Chest pain    Type 2 diabetes mellitus with circulatory disorder, without long-term current use of insulin (HCC)    [facility]STEMI (non-ST elevated myocardial infarction) ([facility] [address]    Coronary artery disease involving native coronary artery of native heart with angina pectoris ([facility])       S/P CABG x 4    Cardio: stable, weaning epi. Hold am coreg, on PO amio. Pulm: stable on 3L NC, encourage IS, wean O2 as tolerated  GI: stable  Renal: creatinine up to 1.3, adequate UOP, DC IVF later today. Lasix x 1 today. Tubes/Lines: DC art line and swan once off pressors. DC hard CT after first walk. Wound: stable.      Etelvina Valiente PA-C

## 2020-04-10 NOTE — PROGRESS NOTES
Order received to DC left radial art line. Suellen with positive blood return and flushed easily. Line pulled, catheter intact. Manual pressure held x 5 minutes. Stasis achieved. No redness, bleeding, bruising, or hematoma noted. Dressing applied. Pt tolerated well.

## 2020-04-10 NOTE — PROGRESS NOTES
Chele BUSTOS at patient bedside new verbal order, once patient is out of bed and walking removed hard chest tube later today. Updated on hemodynamics, patient awake, alert x 4.

## 2020-04-10 NOTE — PROGRESS NOTES
Hospitalist Progress Note      Name:  Lio Apple /Age/Sex: 1955  (62 y.o. male)   MRN & CSN:  [id number] & [id number] Admission Date/Time: 2020  6:10 AM   Location:  -A PCP: Adriana Lopez MD         Hospital Day: 6    Assessment and Plan:   Lio Apple is a 62 y.o.  male  who presents with chest pain and elevated troponin. --- NSTEMI-ACS due to severe 3V CAD. LHC 20 showed 90% proximal LAD, 80% midLAD, 90% ostial LCx and 70% OM1 and RCA stenoses. S/p CABG X 4 on 20 - LIMA-diag, rSVG-proximal LAD, rSVG-OM1, rSVG-PDA. Plan  Continue  ASA 81 mg and lipitor   CT surgery to continue post OP mgt. Pressor mgt per CTS  Hospitalist service will sign off. Please re consult  if any medical issues that require hospitalist attention arise. --- Pseudohyponatremia - lab error. Sodium normal on repeat BMP. --  DM II -  HbA1C 8.1%. on insulin drip. --- Post op hypotension -  On epinephrine drip. --- H/O esophageal dyskinesia - po protonix   --- Obesity - BMI of 35     Diet DIET CLEAR LIQUID; Low Sodium (2 GM); No Caffeine  Dietary Nutrition Supplements: Wound Healing Oral Supplement   DVT Prophylaxis [x] Lovenox, []  Heparin, [] SCDs, [] Ambulation   GI Prophylaxis [x] PPI,  [] H2 Blocker,  [] Carafate,  [] Diet/Tube Feeds   Code Status Full Code   Disposition To be determined. MDM [] Low, [x] Moderate,[]  High     History of Present Illness:   Patient seen and examined. POD1 after 4V CABG. He c/o chest discomfort. Ten point ROS reviewed negative, unless as noted above    Objective: Intake/Output Summary (Last 24 hours) at 4/10/2020 1331  Last data filed at 4/10/2020 1200  Gross per 24 hour   Intake 4863 ml   Output 2430 ml   Net 2433 ml      Vitals:   Vitals:    04/10/20 1230   BP: 116/68   Pulse: 86   Resp: 21   Temp:    SpO2: 93%     Physical Exam:   GEN  male, lying in bed. RESP Breathing comfortably on 3LPM oxygen.    CARDIO/VASC S1/S2 auscultated. Regular rate. IJ CVC triple lumen. PA catheter. MSK No gross joint deformities. SKIN Normal coloration, warm, dry. NEURO normal speech, no lateralizing weakness. PSYCH Awake, alert, oriented x 3    Medications:   Medications:    sodium chloride flush  10 mL Intravenous 2 times per day    ceFAZolin (ANCEF) IVPB  2 g Intravenous Q8H    polyethylene glycol  17 g Oral Daily    pantoprazole  40 mg Oral Daily    carvedilol  3.125 mg Oral BID    amiodarone  200 mg Oral TID    mupirocin   Nasal BID    therapeutic multivitamin-minerals  1 tablet Oral Daily with breakfast    atorvastatin  20 mg Oral Nightly    aspirin  81 mg Oral Daily    [START ON 4/13/2020] amiodarone  200 mg Oral Daily      Infusions:    sodium chloride 75 mL/hr at 04/10/20 0714    norepinephrine      EPINEPHrine infusion Stopped (04/10/20 1017)    nitroGLYCERIN      insulin 2.2 Units/hr (04/10/20 1247)    dextrose       PRN Meds: sodium chloride flush, 10 mL, PRN  potassium chloride, 20 mEq, PRN  magnesium sulfate, 2 g, PRN  calcium gluconate IVPB, 2 g, PRN  acetaminophen, 650 mg, Q4H PRN  HYDROcodone 5 mg - acetaminophen, 1 tablet, Q4H PRN    Or  HYDROcodone 5 mg - acetaminophen, 2 tablet, Q4H PRN  bisacodyl, 10 mg, Daily PRN  fleet, 1 enema, Daily PRN  ondansetron, 4 mg, Q8H PRN  hydrALAZINE, 5 mg, Q5 Min PRN  metoprolol, 2.5 mg, Q10 Min PRN  albumin human, 25 g, PRN  norepinephrine, 2 mcg/min, Continuous PRN  EPINEPHrine infusion, 1 mcg/min, Continuous PRN  nitroGLYCERIN, 10 mcg/min, Continuous PRN  glucose, 15 g, PRN  dextrose, 12.5 g, PRN  glucagon (rDNA), 1 mg, PRN  dextrose, 100 mL/hr, PRN  albuterol sulfate HFA, 2 puff, Q4H PRN        CBC   Recent Labs     04/09/20  1600  04/09/20  2241 04/10/20  0515 04/10/20  0530   WBC 22.9*  --   --  17.7*  --    HGB 13.3*  12.0*   < > 12.9* 12.8* 12.7*   HCT 37.3*  35.0*   < > 38.0* 36.9* 37.0*     --   --  257  --     < > = values in this interval not displayed.

## 2020-04-10 NOTE — OP NOTE
[address], [address] [facility]                                OPERATIVE REPORT    PATIENT NAME: Karina Goyal                      :        1955  MED REC NO:   [id number]                          ROOM:       2101  ACCOUNT NO:   [de-identified]                           ADMIT DATE: 2020  PROVIDER:     Yovanny Fish MD    DATE OF PROCEDURE:  2020    PREOPERATIVE DIAGNOSIS:  Unstable angina, ACS. POSTOPERATIVE DIAGNOSIS:  Unstable angina, ACS. PROCEDURE:  1. Coronary artery bypass grafting x4. Left internal mammary artery  anastomosed to diagonal branch artery, reverse saphenous vein graft  anastomosed to proximal LAD, reverse saphenous vein graft anastomosed to  obtuse marginal artery, reverse saphenous vein graft anastomosed to  posterior descending artery. 2.  Ultrasound of the left greater saphenous vein. 3.  Endoscopic vein harvest of the left greater saphenous vein. 4.  Left [place]-Joyce introducer with central venous catheter placement. SURGEON:  Yovanny Fish MD    ASSISTANT SURGEON:  Pleasant Sicard, MD    ASSISTANT:  Angela Kelley PA-C    EBL: unable to calculate due to CPB    PREOP:  This is a 55-year-old male who presented with an acute coronary  syndrome, NSTEMI. He underwent cardiac catheterization which showed  multivessel coronary disease with a very small LAD. He was referred for  surgical revascularization. Risks and benefits of the procedure were  discussed in detail with the patient. The patient understood and agreed  to proceed. FINDINGS:  The left anterior descending artery was intramyocardial and a  very small vessel. We did identify but felt that it was not large  enough to put the left internal mammary artery on. The diagonal branch  vessel was a larger vessel, so this was the location of our LIMA  anastomosis.   Obtuse marginal vessel was also intramyocardial but an  adequate sutures,  subcutaneous tissue was closed using 2-0 Vicryl sutures, and skin was  closed using 4-0 Monocryl in a subcuticular fashion. Sponge count,  needle count, and instrument count were correct. The patient tolerated  the procedure well and was transported to the CVICU in stable condition.         Keely Retana MD    D: 04/09/2020 16:01:48       T: 04/09/2020 16:11:05     [email or web address]  Job#: [id number]     Doc#: [id number]    CC:

## 2020-04-10 NOTE — PROGRESS NOTES
Healing, Weight, Pertinent Labs, Patient/Family Education, Monitor Bowel Function      Electronically signed by Gelacio Gandhi RD, LD on 4/10/20 at 9:12 AM EDT    Contact Number: [phone]

## 2020-04-10 NOTE — CONSULTS
Patient seen for diabetic educational needs. Jd Duron is a 62y.o. year old male admitted with NSTEMI post op CABG x 4  Patient Active Problem List   Diagnosis    Gastroesophageal reflux disease with esophagitis    Esophageal dyskinesia    History of colonic polyps    Gastroesophageal reflux disease without esophagitis    Chest pain    Type 2 diabetes mellitus with circulatory disorder, without long-term current use of insulin ([facility]    NSTEMI (non-ST elevated myocardial infarction) ([facility]    Coronary artery disease involving native coronary artery of native heart with angina pectoris ([facility]      HgBA1c:    Lab Results   Component Value Date    LABA1C 8.1 04/06/2020     Previous Dx Type 2 Diabetes. States \"wife is a nurse\"  Reports that he goes to [place] for Diabetes Mgt. Reports \"supposed to check BG 3 times a week\"  Has meter and supplies provided by [place]. States that he does not check BG as directed. Reports compliance with taking medications. Diabetes Self Management:  · Insulin administration: Discussed role of insulin post op heart surgery. Verbalized understanding  · Glucose Monitoring:Discussed BG targets and A1C target. Discussed importance of BG control for wound healing and Heart Health. Verbalized understanding. Discussed when to notify provider. · Meal planning: Reviewed importance of eating three meals per day and plate method for consistent carb intake. Verbalized understanding. · Hypoglycemia: Reviewed symptoms, prevention and treatment. Verbalized understanding. · Hyperglycemia:  Reviewed symptoms, prevention and treatment. Verbalized understanding. · PT/OT recommending discharge home with Anitha Sanchez. [facility]. Printed information Diabetes,  contact number for Diabetes Educator and resources for ongoing education provided. Maite Winston RN, BSN, CDE

## 2020-04-10 NOTE — PROGRESS NOTES
Occupational Therapy    [facility] ACUTE CARE OCCUPATIONAL THERAPY EVALUATION    Hernan Cohen, 1955, 2101/2101-A, 4/10/2020    Discharge Recommendation: Home with initial 24 hour supervision/assistance, Home with [facility] services (S Level 3)      History:  [place]:  The encounter diagnosis was Chest pain, unspecified type. Subjective:  Patient states: \"I feel like I've been hit by a truck. \"  Pain: Pt reported 6/10 surgical pain in sternum  Communication with other providers: PT Yesy Grewal, RN Governor yDan  Restrictions: General Precautions, Fall Risk, Sternal Precautions, Central line, [place], Telemetry, Pulse Ox, BP cuff, Chest tube (x2), Jay, 2L o2, SCDs    Home Setup/Prior level of function:  Social/Functional History  Lives With: Spouse  Type of Home: House  Home Layout: One level  Home Access: Stairs to enter without rails  Entrance Stairs - Number of Steps: 1  Bathroom Shower/Tub: Tub/Shower unit  Bathroom Toilet: Standard  Bathroom Equipment: Grab bars in shower, Shower chair (does not use shower chair)  Home Equipment: [employer], Wheelchair-electric, 4 wheeled walker  ADL Assistance: Independent  Homemaking Assistance: Independent  Ambulation Assistance: Independent (no AD)  Transfer Assistance: Independent  Active : Yes  Occupation: Part time employment  Type of occupation: [employer]   Leisure: Golfing     Examination:  · Observation: Supine in bed upon arrival. Agreeable to evaluation. Painful throughout session. · Vision: [facility]   · Hearing: WFL  · Vitals: Stable vitals throughout session    Body Systems and functions:  · ROM: WFL all joints in BL UEs (active shoulder flexion kept 0-90')  · Strength: WFL all major muscle groups BL UEs  · Sensation: WFL (denies numbness/tingling)  · Tone: Normal  · Coordination: WFL for ADLs  · Perception: WNL    Activities of Daily Living (ADLs):  · Feeding: Independent  · Grooming: SBA (seated facial hygiene task EOB)  · UB bathing: CGA  · LB bathing:  Mod

## 2020-04-11 ENCOUNTER — APPOINTMENT (OUTPATIENT)
Dept: GENERAL RADIOLOGY | Age: 65
DRG: 234 | End: 2020-04-11
Payer: OTHER GOVERNMENT

## 2020-04-11 LAB
ANION GAP SERPL CALCULATED.3IONS-SCNC: 10 MMOL/L (ref 4–16)
BUN BLDV-MCNC: 25 MG/DL (ref 6–23)
CALCIUM IONIZED: 4.72 MG/DL (ref 4.48–5.28)
CALCIUM SERPL-MCNC: 8.8 MG/DL (ref 8.3–10.6)
CHLORIDE BLD-SCNC: 97 MMOL/L (ref 99–110)
CO2: 28 MMOL/L (ref 21–32)
CREAT SERPL-MCNC: 1.1 MG/DL (ref 0.9–1.3)
GFR AFRICAN AMERICAN: >60 ML/MIN/1.73M2
GFR NON-AFRICAN AMERICAN: >60 ML/MIN/1.73M2
GLUCOSE BLD-MCNC: 100 MG/DL (ref 70–99)
GLUCOSE BLD-MCNC: 105 MG/DL (ref 70–99)
GLUCOSE BLD-MCNC: 106 MG/DL (ref 70–99)
GLUCOSE BLD-MCNC: 110 MG/DL (ref 70–99)
GLUCOSE BLD-MCNC: 112 MG/DL (ref 70–99)
GLUCOSE BLD-MCNC: 115 MG/DL (ref 70–99)
GLUCOSE BLD-MCNC: 123 MG/DL (ref 70–99)
GLUCOSE BLD-MCNC: 126 MG/DL (ref 70–99)
GLUCOSE BLD-MCNC: 128 MG/DL (ref 70–99)
GLUCOSE BLD-MCNC: 129 MG/DL (ref 70–99)
GLUCOSE BLD-MCNC: 137 MG/DL (ref 70–99)
GLUCOSE BLD-MCNC: 139 MG/DL (ref 70–99)
GLUCOSE BLD-MCNC: 141 MG/DL (ref 70–99)
GLUCOSE BLD-MCNC: 141 MG/DL (ref 70–99)
GLUCOSE BLD-MCNC: 168 MG/DL (ref 70–99)
GLUCOSE BLD-MCNC: 173 MG/DL (ref 70–99)
GLUCOSE BLD-MCNC: 95 MG/DL (ref 70–99)
GLUCOSE BLD-MCNC: 99 MG/DL (ref 70–99)
HCT VFR BLD CALC: 35.3 % (ref 42–52)
HEMOGLOBIN: 12.2 GM/DL (ref 13.5–18)
IONIZED CA: 1.18 MMOL/L (ref 1.12–1.32)
MAGNESIUM: 2 MG/DL (ref 1.8–2.4)
MCH RBC QN AUTO: 31.7 PG (ref 27–31)
MCHC RBC AUTO-ENTMCNC: 34.6 % (ref 32–36)
MCV RBC AUTO: 91.7 FL (ref 78–100)
PDW BLD-RTO: 12.5 % (ref 11.7–14.9)
PLATELET # BLD: 204 K/CU MM (ref 140–440)
PMV BLD AUTO: 10.3 FL (ref 7.5–11.1)
POTASSIUM SERPL-SCNC: 4 MMOL/L (ref 3.5–5.1)
RBC # BLD: 3.85 M/CU MM (ref 4.6–6.2)
SODIUM BLD-SCNC: 135 MMOL/L (ref 135–145)
WBC # BLD: 15.5 K/CU MM (ref 4–10.5)

## 2020-04-11 PROCEDURE — 97116 GAIT TRAINING THERAPY: CPT

## 2020-04-11 PROCEDURE — 83735 ASSAY OF MAGNESIUM: CPT

## 2020-04-11 PROCEDURE — 80048 BASIC METABOLIC PNL TOTAL CA: CPT

## 2020-04-11 PROCEDURE — 97110 THERAPEUTIC EXERCISES: CPT

## 2020-04-11 PROCEDURE — 2700000000 HC OXYGEN THERAPY PER DAY

## 2020-04-11 PROCEDURE — 85027 COMPLETE CBC AUTOMATED: CPT

## 2020-04-11 PROCEDURE — 6370000000 HC RX 637 (ALT 250 FOR IP): Performed by: SURGERY

## 2020-04-11 PROCEDURE — 71045 X-RAY EXAM CHEST 1 VIEW: CPT

## 2020-04-11 PROCEDURE — 6360000002 HC RX W HCPCS: Performed by: PHYSICIAN ASSISTANT

## 2020-04-11 PROCEDURE — 82330 ASSAY OF CALCIUM: CPT

## 2020-04-11 PROCEDURE — 94761 N-INVAS EAR/PLS OXIMETRY MLT: CPT

## 2020-04-11 PROCEDURE — 97530 THERAPEUTIC ACTIVITIES: CPT

## 2020-04-11 PROCEDURE — 6360000002 HC RX W HCPCS: Performed by: SURGERY

## 2020-04-11 PROCEDURE — 6370000000 HC RX 637 (ALT 250 FOR IP): Performed by: PHYSICIAN ASSISTANT

## 2020-04-11 PROCEDURE — 2000000000 HC ICU R&B

## 2020-04-11 PROCEDURE — 2580000003 HC RX 258: Performed by: PHYSICIAN ASSISTANT

## 2020-04-11 PROCEDURE — 82962 GLUCOSE BLOOD TEST: CPT

## 2020-04-11 RX ORDER — FUROSEMIDE 10 MG/ML
20 INJECTION INTRAMUSCULAR; INTRAVENOUS DAILY
Status: DISCONTINUED | OUTPATIENT
Start: 2020-04-11 | End: 2020-04-13

## 2020-04-11 RX ORDER — SIMETHICONE 80 MG
80 TABLET,CHEWABLE ORAL EVERY 6 HOURS PRN
Status: DISCONTINUED | OUTPATIENT
Start: 2020-04-11 | End: 2020-04-13 | Stop reason: HOSPADM

## 2020-04-11 RX ADMIN — SODIUM CHLORIDE, PRESERVATIVE FREE 10 ML: 5 INJECTION INTRAVENOUS at 09:17

## 2020-04-11 RX ADMIN — HYDROCODONE BITARTRATE AND ACETAMINOPHEN 2 TABLET: 5; 325 TABLET ORAL at 06:32

## 2020-04-11 RX ADMIN — CALCIUM GLUCONATE 2 G: 98 INJECTION, SOLUTION INTRAVENOUS at 10:22

## 2020-04-11 RX ADMIN — FUROSEMIDE 20 MG: 10 INJECTION, SOLUTION INTRAMUSCULAR; INTRAVENOUS at 10:22

## 2020-04-11 RX ADMIN — SIMETHICONE CHEW TAB 80 MG 80 MG: 80 TABLET ORAL at 17:09

## 2020-04-11 RX ADMIN — CARVEDILOL 3.12 MG: 3.12 TABLET, FILM COATED ORAL at 09:17

## 2020-04-11 RX ADMIN — AMIODARONE HYDROCHLORIDE 200 MG: 200 TABLET ORAL at 09:17

## 2020-04-11 RX ADMIN — HYDROCODONE BITARTRATE AND ACETAMINOPHEN 2 TABLET: 5; 325 TABLET ORAL at 10:49

## 2020-04-11 RX ADMIN — Medication: at 21:10

## 2020-04-11 RX ADMIN — AMIODARONE HYDROCHLORIDE 200 MG: 200 TABLET ORAL at 21:10

## 2020-04-11 RX ADMIN — ASPIRIN 81 MG: 81 TABLET, COATED ORAL at 09:17

## 2020-04-11 RX ADMIN — Medication: at 09:17

## 2020-04-11 RX ADMIN — AMIODARONE HYDROCHLORIDE 200 MG: 200 TABLET ORAL at 13:55

## 2020-04-11 RX ADMIN — HYDROCODONE BITARTRATE AND ACETAMINOPHEN 2 TABLET: 5; 325 TABLET ORAL at 02:17

## 2020-04-11 RX ADMIN — CARVEDILOL 3.12 MG: 3.12 TABLET, FILM COATED ORAL at 21:11

## 2020-04-11 RX ADMIN — ATORVASTATIN CALCIUM 20 MG: 20 TABLET, FILM COATED ORAL at 21:10

## 2020-04-11 RX ADMIN — HYDROCODONE BITARTRATE AND ACETAMINOPHEN 2 TABLET: 5; 325 TABLET ORAL at 22:46

## 2020-04-11 RX ADMIN — POLYETHYLENE GLYCOL (3350) 17 G: 17 POWDER, FOR SOLUTION ORAL at 09:16

## 2020-04-11 RX ADMIN — PANTOPRAZOLE SODIUM 40 MG: 40 TABLET, DELAYED RELEASE ORAL at 09:17

## 2020-04-11 RX ADMIN — HYDROCODONE BITARTRATE AND ACETAMINOPHEN 2 TABLET: 5; 325 TABLET ORAL at 15:33

## 2020-04-11 RX ADMIN — BISACODYL 10 MG: 10 SUPPOSITORY RECTAL at 20:06

## 2020-04-11 RX ADMIN — SODIUM CHLORIDE 4.4 UNITS/HR: 9 INJECTION, SOLUTION INTRAVENOUS at 21:09

## 2020-04-11 ASSESSMENT — PAIN DESCRIPTION - PROGRESSION
CLINICAL_PROGRESSION: GRADUALLY WORSENING
CLINICAL_PROGRESSION: GRADUALLY WORSENING
CLINICAL_PROGRESSION: NOT CHANGED
CLINICAL_PROGRESSION: GRADUALLY WORSENING
CLINICAL_PROGRESSION: GRADUALLY IMPROVING
CLINICAL_PROGRESSION: GRADUALLY WORSENING
CLINICAL_PROGRESSION: GRADUALLY IMPROVING
CLINICAL_PROGRESSION: GRADUALLY WORSENING
CLINICAL_PROGRESSION: GRADUALLY WORSENING

## 2020-04-11 ASSESSMENT — PAIN DESCRIPTION - PAIN TYPE
TYPE: SURGICAL PAIN
TYPE: ACUTE PAIN
TYPE: SURGICAL PAIN
TYPE: SURGICAL PAIN;ACUTE PAIN
TYPE: SURGICAL PAIN
TYPE: SURGICAL PAIN

## 2020-04-11 ASSESSMENT — PAIN DESCRIPTION - FREQUENCY
FREQUENCY: CONTINUOUS
FREQUENCY: CONTINUOUS
FREQUENCY: INTERMITTENT

## 2020-04-11 ASSESSMENT — PAIN DESCRIPTION - LOCATION
LOCATION: STERNUM;INCISION
LOCATION: STERNUM
LOCATION: INCISION;CHEST
LOCATION: ABDOMEN
LOCATION: CHEST;INCISION

## 2020-04-11 ASSESSMENT — PAIN SCALES - GENERAL
PAINLEVEL_OUTOF10: 7
PAINLEVEL_OUTOF10: 3
PAINLEVEL_OUTOF10: 7
PAINLEVEL_OUTOF10: 3
PAINLEVEL_OUTOF10: 0
PAINLEVEL_OUTOF10: 5
PAINLEVEL_OUTOF10: 9
PAINLEVEL_OUTOF10: 7
PAINLEVEL_OUTOF10: 7
PAINLEVEL_OUTOF10: 0
PAINLEVEL_OUTOF10: 0

## 2020-04-11 ASSESSMENT — PAIN DESCRIPTION - ORIENTATION
ORIENTATION: ANTERIOR
ORIENTATION: MID
ORIENTATION: MID

## 2020-04-11 ASSESSMENT — PAIN DESCRIPTION - DESCRIPTORS
DESCRIPTORS: ACHING;DISCOMFORT;DULL
DESCRIPTORS: CRAMPING
DESCRIPTORS: ACHING;DISCOMFORT

## 2020-04-11 ASSESSMENT — PAIN - FUNCTIONAL ASSESSMENT
PAIN_FUNCTIONAL_ASSESSMENT: ACTIVITIES ARE NOT PREVENTED
PAIN_FUNCTIONAL_ASSESSMENT: ACTIVITIES ARE NOT PREVENTED

## 2020-04-11 ASSESSMENT — PAIN DESCRIPTION - ONSET
ONSET: GRADUAL
ONSET: ON-GOING

## 2020-04-11 NOTE — PROGRESS NOTES
encouraged coughing and deep breathing along with ISE usage. He is reluctant to do it every 1 hour. Pt not drinking the miralax. Poor appetite is noted. Reviewed sternal precautions again.

## 2020-04-11 NOTE — PROGRESS NOTES
Physical Therapy    Physical Therapy Treatment Note  Name: Horacio Mcardle MRN: [id number] :   1955   Date:  2020   Admission Date: 2020 Room:  A   Restrictions/Precautions:        cardiac precautions  Communication with other providers:  Per nurse ok to tx   Subjective:  Patient states:  Pt motivated and agreeable to tx  Pain:   Location, Type, Intensity (0/10 to 10/10): Denies having pain during tx session  Objective:    Observation:  Alert and oriented  Pt Equipment:  [x ] Tele  [x ] IV   [x ] Jay  [ ] PCP  [x O2  [ ] Drains  [x ] Chest tube  [ ] Other      Treatment, including education/measures:  Sup to sit with bed flat mod assist and cues for cardiac precautions  Sit<=>stand cga and cues  amb with cardiac walker 300' sba    Education:  Pt was instructed in Sternal Precautions, Purpose of Exercise Program, Nj Scale and Signs and Symptoms of Exercise Intolerance per Cardiac Protocol  Pt was instructed in Intermediate Cardiac ex program:   Overhead Side Stretch x 10  Ankle Pumps/ Heel Raises x 10  Marching Seated x 10  Forward Arm Raises x 10  Side Arm Raises x 10  Arm Crosses x 10  Arm Circles Forwards and Backwards x 10  SittingTrunkTwist x 10      Education:  Pt was instructed in Sternal Precautions, Purpose of Exercise Program, Nj Scale and Signs and Symptoms of Exercise Intolerance per Cardiac Protocol  Pt was instructed in Intermediate Cardiac ex program:  Overhead Side Stretch x 10  Ankle Pumps/ Heel Raises x 10  Marching Seated x 10  Forward Arm Raises x 10  Side Arm Raises x 10  Arm Crosses x 10  Arm Circles Forwards and Backwards x 10  SittingTrunkTwist x 10    Safety  Patient left safely in the chair, with call light/phone in reach. Gait belt was used for transfers and gait.  Nurse in room at end of tx to give meds  Assessment / Impression:       Patient's tolerance of treatment:  good   Adverse Reaction: na  Significant change in status and impact:  na  Barriers to

## 2020-04-12 ENCOUNTER — APPOINTMENT (OUTPATIENT)
Dept: GENERAL RADIOLOGY | Age: 65
DRG: 234 | End: 2020-04-12
Payer: OTHER GOVERNMENT

## 2020-04-12 LAB
ANION GAP SERPL CALCULATED.3IONS-SCNC: 13 MMOL/L (ref 4–16)
BUN BLDV-MCNC: 30 MG/DL (ref 6–23)
CALCIUM IONIZED: 4.6 MG/DL (ref 4.48–5.28)
CALCIUM SERPL-MCNC: 8.9 MG/DL (ref 8.3–10.6)
CHLORIDE BLD-SCNC: 94 MMOL/L (ref 99–110)
CO2: 27 MMOL/L (ref 21–32)
CREAT SERPL-MCNC: 1.1 MG/DL (ref 0.9–1.3)
GFR AFRICAN AMERICAN: >60 ML/MIN/1.73M2
GFR NON-AFRICAN AMERICAN: >60 ML/MIN/1.73M2
GLUCOSE BLD-MCNC: 104 MG/DL (ref 70–99)
GLUCOSE BLD-MCNC: 106 MG/DL (ref 70–99)
GLUCOSE BLD-MCNC: 107 MG/DL (ref 70–99)
GLUCOSE BLD-MCNC: 107 MG/DL (ref 70–99)
GLUCOSE BLD-MCNC: 109 MG/DL (ref 70–99)
GLUCOSE BLD-MCNC: 114 MG/DL (ref 70–99)
GLUCOSE BLD-MCNC: 120 MG/DL (ref 70–99)
GLUCOSE BLD-MCNC: 126 MG/DL (ref 70–99)
GLUCOSE BLD-MCNC: 190 MG/DL (ref 70–99)
GLUCOSE BLD-MCNC: 210 MG/DL (ref 70–99)
GLUCOSE BLD-MCNC: 258 MG/DL (ref 70–99)
GLUCOSE BLD-MCNC: 99 MG/DL (ref 70–99)
HCT VFR BLD CALC: 34.7 % (ref 42–52)
HEMOGLOBIN: 11.9 GM/DL (ref 13.5–18)
IONIZED CA: 1.15 MMOL/L (ref 1.12–1.32)
MAGNESIUM: 2 MG/DL (ref 1.8–2.4)
MCH RBC QN AUTO: 31.6 PG (ref 27–31)
MCHC RBC AUTO-ENTMCNC: 34.3 % (ref 32–36)
MCV RBC AUTO: 92 FL (ref 78–100)
PDW BLD-RTO: 12.5 % (ref 11.7–14.9)
PLATELET # BLD: 241 K/CU MM (ref 140–440)
PMV BLD AUTO: 10.6 FL (ref 7.5–11.1)
POTASSIUM SERPL-SCNC: 3.3 MMOL/L (ref 3.5–5.1)
RBC # BLD: 3.77 M/CU MM (ref 4.6–6.2)
SODIUM BLD-SCNC: 134 MMOL/L (ref 135–145)
WBC # BLD: 15.8 K/CU MM (ref 4–10.5)

## 2020-04-12 PROCEDURE — 71046 X-RAY EXAM CHEST 2 VIEWS: CPT

## 2020-04-12 PROCEDURE — 6360000002 HC RX W HCPCS: Performed by: PHYSICIAN ASSISTANT

## 2020-04-12 PROCEDURE — 80048 BASIC METABOLIC PNL TOTAL CA: CPT

## 2020-04-12 PROCEDURE — 94761 N-INVAS EAR/PLS OXIMETRY MLT: CPT

## 2020-04-12 PROCEDURE — 94150 VITAL CAPACITY TEST: CPT

## 2020-04-12 PROCEDURE — 6370000000 HC RX 637 (ALT 250 FOR IP): Performed by: SURGERY

## 2020-04-12 PROCEDURE — 94664 DEMO&/EVAL PT USE INHALER: CPT

## 2020-04-12 PROCEDURE — 2000000000 HC ICU R&B

## 2020-04-12 PROCEDURE — 6360000002 HC RX W HCPCS: Performed by: SURGERY

## 2020-04-12 PROCEDURE — 2580000003 HC RX 258: Performed by: PHYSICIAN ASSISTANT

## 2020-04-12 PROCEDURE — 6370000000 HC RX 637 (ALT 250 FOR IP): Performed by: PHYSICIAN ASSISTANT

## 2020-04-12 PROCEDURE — 83735 ASSAY OF MAGNESIUM: CPT

## 2020-04-12 PROCEDURE — 2700000000 HC OXYGEN THERAPY PER DAY

## 2020-04-12 PROCEDURE — 82330 ASSAY OF CALCIUM: CPT

## 2020-04-12 PROCEDURE — 85027 COMPLETE CBC AUTOMATED: CPT

## 2020-04-12 PROCEDURE — 82962 GLUCOSE BLOOD TEST: CPT

## 2020-04-12 RX ORDER — GLIPIZIDE 5 MG/1
10 TABLET ORAL
Status: DISCONTINUED | OUTPATIENT
Start: 2020-04-13 | End: 2020-04-13 | Stop reason: HOSPADM

## 2020-04-12 RX ADMIN — HYDROCODONE BITARTRATE AND ACETAMINOPHEN 2 TABLET: 5; 325 TABLET ORAL at 09:59

## 2020-04-12 RX ADMIN — Medication: at 21:11

## 2020-04-12 RX ADMIN — BISACODYL 10 MG: 10 SUPPOSITORY RECTAL at 18:12

## 2020-04-12 RX ADMIN — HYDROCODONE BITARTRATE AND ACETAMINOPHEN 2 TABLET: 5; 325 TABLET ORAL at 05:17

## 2020-04-12 RX ADMIN — AMIODARONE HYDROCHLORIDE 200 MG: 200 TABLET ORAL at 09:16

## 2020-04-12 RX ADMIN — ATORVASTATIN CALCIUM 20 MG: 20 TABLET, FILM COATED ORAL at 21:11

## 2020-04-12 RX ADMIN — AMIODARONE HYDROCHLORIDE 200 MG: 200 TABLET ORAL at 21:11

## 2020-04-12 RX ADMIN — HYDROCODONE BITARTRATE AND ACETAMINOPHEN 1 TABLET: 5; 325 TABLET ORAL at 17:36

## 2020-04-12 RX ADMIN — SODIUM CHLORIDE, PRESERVATIVE FREE 10 ML: 5 INJECTION INTRAVENOUS at 21:13

## 2020-04-12 RX ADMIN — AMIODARONE HYDROCHLORIDE 200 MG: 200 TABLET ORAL at 17:00

## 2020-04-12 RX ADMIN — SIMETHICONE CHEW TAB 80 MG 80 MG: 80 TABLET ORAL at 22:36

## 2020-04-12 RX ADMIN — CARVEDILOL 3.12 MG: 3.12 TABLET, FILM COATED ORAL at 09:16

## 2020-04-12 RX ADMIN — INSULIN LISPRO 2 UNITS: 100 INJECTION, SOLUTION INTRAVENOUS; SUBCUTANEOUS at 13:05

## 2020-04-12 RX ADMIN — HYDROCODONE BITARTRATE AND ACETAMINOPHEN 1 TABLET: 5; 325 TABLET ORAL at 22:36

## 2020-04-12 RX ADMIN — PANTOPRAZOLE SODIUM 40 MG: 40 TABLET, DELAYED RELEASE ORAL at 09:16

## 2020-04-12 RX ADMIN — CARVEDILOL 3.12 MG: 3.12 TABLET, FILM COATED ORAL at 21:11

## 2020-04-12 RX ADMIN — POTASSIUM CHLORIDE 20 MEQ: 400 INJECTION, SOLUTION INTRAVENOUS at 07:06

## 2020-04-12 RX ADMIN — POLYETHYLENE GLYCOL (3350) 17 G: 17 POWDER, FOR SOLUTION ORAL at 09:53

## 2020-04-12 RX ADMIN — SIMETHICONE CHEW TAB 80 MG 80 MG: 80 TABLET ORAL at 02:09

## 2020-04-12 RX ADMIN — CALCIUM GLUCONATE 2 G: 98 INJECTION, SOLUTION INTRAVENOUS at 07:03

## 2020-04-12 RX ADMIN — Medication: at 09:16

## 2020-04-12 RX ADMIN — SODIUM CHLORIDE, PRESERVATIVE FREE 10 ML: 5 INJECTION INTRAVENOUS at 09:17

## 2020-04-12 RX ADMIN — INSULIN LISPRO 1 UNITS: 100 INJECTION, SOLUTION INTRAVENOUS; SUBCUTANEOUS at 17:32

## 2020-04-12 RX ADMIN — ASPIRIN 81 MG: 81 TABLET, COATED ORAL at 09:16

## 2020-04-12 RX ADMIN — FUROSEMIDE 20 MG: 10 INJECTION, SOLUTION INTRAMUSCULAR; INTRAVENOUS at 09:53

## 2020-04-12 ASSESSMENT — PAIN SCALES - GENERAL
PAINLEVEL_OUTOF10: 3
PAINLEVEL_OUTOF10: 9
PAINLEVEL_OUTOF10: 7
PAINLEVEL_OUTOF10: 0
PAINLEVEL_OUTOF10: 6
PAINLEVEL_OUTOF10: 0
PAINLEVEL_OUTOF10: 2
PAINLEVEL_OUTOF10: 4
PAINLEVEL_OUTOF10: 0
PAINLEVEL_OUTOF10: 0

## 2020-04-12 ASSESSMENT — PAIN DESCRIPTION - ORIENTATION
ORIENTATION: MID
ORIENTATION: MID

## 2020-04-12 ASSESSMENT — PAIN DESCRIPTION - PROGRESSION
CLINICAL_PROGRESSION: GRADUALLY IMPROVING
CLINICAL_PROGRESSION: NOT CHANGED
CLINICAL_PROGRESSION: GRADUALLY WORSENING

## 2020-04-12 ASSESSMENT — PAIN DESCRIPTION - PAIN TYPE
TYPE: SURGICAL PAIN

## 2020-04-12 ASSESSMENT — PAIN DESCRIPTION - LOCATION
LOCATION: INCISION;STERNUM
LOCATION: STERNUM
LOCATION: STERNUM;INCISION

## 2020-04-12 ASSESSMENT — PAIN DESCRIPTION - ONSET
ONSET: AWAKENED FROM SLEEP
ONSET: GRADUAL

## 2020-04-12 ASSESSMENT — PAIN DESCRIPTION - FREQUENCY
FREQUENCY: INTERMITTENT
FREQUENCY: INTERMITTENT

## 2020-04-12 ASSESSMENT — PAIN DESCRIPTION - DESCRIPTORS
DESCRIPTORS: ACHING;DISCOMFORT
DESCRIPTORS: ACHING

## 2020-04-12 NOTE — PROGRESS NOTES
Dr. Chris Dominguez here seeing pt and updated. Ok to remove pacer wires today and give 2 suppositories to help with BM and gas. To review home diabetic medications. Does not want to consult Dr. Roxanne Wu for this admission. Insulin gtt off per order. Will do a SSI with meals.

## 2020-04-12 NOTE — PROGRESS NOTES
PATIENT NAME: Karen Lopes    TODAY'S DATE: 04/12/20    SUBJECTIVE:    Pt status post CABG postop day #3  Complains of constipation otherwise doing well  Still requiring 1 L of oxygen. OBJECTIVE:   VITALS:    Vitals:    04/12/20 0846   BP:    Pulse:    Resp:    Temp: 98.6 °F (37 °C)   SpO2:      INTAKE/OUTPUT:    Date 04/12/20 0000 - 04/12/20 2359   Shift 3803-2813 7277-0184 9390-6138 24 Hour Total   INTAKE   I.V.(mL/kg/hr) 316.2(0.3)   316.2   Shift Total(mL/kg) 316.2(2.8)   316.2(2.8)   OUTPUT   Urine(mL/kg/hr) 200(0.2)   200   Shift Total(mL/kg) 200(1.8)   200(1.8)   Weight (kg) 114 114 114 114      Patient Vitals for the past 96 hrs (Last 3 readings):   Weight   04/12/20 0602 251 lb 5.2 oz (114 kg)   04/11/20 0552 247 lb 5.7 oz (112.2 kg)   04/10/20 0530 247 lb 2.2 oz (112.1 kg)       EXAM:  Blood pressure 117/78, pulse 86, temperature 98.6 °F (37 °C), temperature source Oral, resp. rate 12, height 5' 10\" (1.778 m), weight 251 lb 5.2 oz (114 kg), SpO2 91 %. General appearance: No apparent distress, appears stated age and cooperative. Skin: unremarkable  HEENT Normocephalic, atraumatic without obvious deformity. Neck: Supple, Trachea midline   Lungs: Good respiratory effort.  Clear to auscultation, bilaterally  Heart: Regular rate/ rhythm inc c/d/i  Abdomen: Soft, non-tender or non-distended   Extremities: edema warm well perfused  Neurologic: Alert, grossly intact  Mental status: normal affect      Data:  CBC:   Recent Labs     04/10/20  0515 04/10/20  0530 04/11/20  0600 04/12/20  0515   WBC 17.7*  --  15.5* 15.8*   HGB 12.8* 12.7* 12.2* 11.9*   HCT 36.9* 37.0* 35.3* 34.7*     --  204 241     BMP:    Recent Labs     04/10/20  0515 04/10/20  0530 04/11/20  0600 04/12/20  0515   * 136* 135 134*   K 4.5 4.5 4.0 3.3*   CL 97*  --  97* 94*   CO2 22 26 28 27   BUN 21  --  25* 30*   CREATININE 1.3 1.5* 1.1 1.1   GLUCOSE 122*  --  100* 107*     Hepatic: No results for input(s): AST, ALT, ALB, BILITOT, ALKPHOS in the last 72 hours. Mag:      Recent Labs     04/10/20  0515 04/11/20  0600 04/12/20  0515   MG 2.0 2.0 2.0      Phos:   No results for input(s): PHOS in the last 72 hours. INR:   Recent Labs     04/09/20  1600   INR 1.16       Radiology Review:  CXR lung fields expanded      ASSESSMENT AND PLAN:  S/P CABG day #3  Patient Active Problem List   Diagnosis    Gastroesophageal reflux disease with esophagitis    Esophageal dyskinesia    History of colonic polyps    Gastroesophageal reflux disease without esophagitis    Chest pain    Type 2 diabetes mellitus with circulatory disorder, without long-term current use of insulin (HCC)    NSTEMI (non-ST elevated myocardial infarction) ([address]    Coronary artery disease involving native coronary artery of native heart with angina pectoris ([address]       Mobilize. Pulmonary toilet. Diuresis  Continue postoperative care per protocol  Laxative  Continue monitor heart heart rate rhythm blood pressure and oxygen  Probable discharge in a.m.

## 2020-04-13 ENCOUNTER — APPOINTMENT (OUTPATIENT)
Dept: GENERAL RADIOLOGY | Age: 65
DRG: 234 | End: 2020-04-13
Payer: OTHER GOVERNMENT

## 2020-04-13 VITALS
DIASTOLIC BLOOD PRESSURE: 75 MMHG | TEMPERATURE: 98.5 F | RESPIRATION RATE: 19 BRPM | BODY MASS INDEX: 35.29 KG/M2 | OXYGEN SATURATION: 92 % | HEART RATE: 88 BPM | HEIGHT: 70 IN | SYSTOLIC BLOOD PRESSURE: 126 MMHG | WEIGHT: 246.47 LBS

## 2020-04-13 LAB
ANION GAP SERPL CALCULATED.3IONS-SCNC: 12 MMOL/L (ref 4–16)
BUN BLDV-MCNC: 31 MG/DL (ref 6–23)
CALCIUM IONIZED: 4.4 MG/DL (ref 4.48–5.28)
CALCIUM SERPL-MCNC: 8.7 MG/DL (ref 8.3–10.6)
CHLORIDE BLD-SCNC: 94 MMOL/L (ref 99–110)
CO2: 28 MMOL/L (ref 21–32)
CREAT SERPL-MCNC: 1.1 MG/DL (ref 0.9–1.3)
GFR AFRICAN AMERICAN: >60 ML/MIN/1.73M2
GFR NON-AFRICAN AMERICAN: >60 ML/MIN/1.73M2
GLUCOSE BLD-MCNC: 203 MG/DL (ref 70–99)
GLUCOSE BLD-MCNC: 217 MG/DL (ref 70–99)
GLUCOSE BLD-MCNC: 226 MG/DL (ref 70–99)
IONIZED CA: 1.1 MMOL/L (ref 1.12–1.32)
MAGNESIUM: 2.1 MG/DL (ref 1.8–2.4)
POTASSIUM SERPL-SCNC: 3.7 MMOL/L (ref 3.5–5.1)
SODIUM BLD-SCNC: 134 MMOL/L (ref 135–145)

## 2020-04-13 PROCEDURE — 71045 X-RAY EXAM CHEST 1 VIEW: CPT

## 2020-04-13 PROCEDURE — 80048 BASIC METABOLIC PNL TOTAL CA: CPT

## 2020-04-13 PROCEDURE — 6360000002 HC RX W HCPCS: Performed by: PHYSICIAN ASSISTANT

## 2020-04-13 PROCEDURE — 82330 ASSAY OF CALCIUM: CPT

## 2020-04-13 PROCEDURE — 97530 THERAPEUTIC ACTIVITIES: CPT

## 2020-04-13 PROCEDURE — 2580000003 HC RX 258: Performed by: PHYSICIAN ASSISTANT

## 2020-04-13 PROCEDURE — 6360000002 HC RX W HCPCS: Performed by: SURGERY

## 2020-04-13 PROCEDURE — 83735 ASSAY OF MAGNESIUM: CPT

## 2020-04-13 PROCEDURE — 97116 GAIT TRAINING THERAPY: CPT

## 2020-04-13 PROCEDURE — 6370000000 HC RX 637 (ALT 250 FOR IP): Performed by: SURGERY

## 2020-04-13 PROCEDURE — 82962 GLUCOSE BLOOD TEST: CPT

## 2020-04-13 PROCEDURE — 97110 THERAPEUTIC EXERCISES: CPT

## 2020-04-13 PROCEDURE — 6370000000 HC RX 637 (ALT 250 FOR IP): Performed by: PHYSICIAN ASSISTANT

## 2020-04-13 RX ORDER — FUROSEMIDE 20 MG/1
20 TABLET ORAL 2 TIMES DAILY
Qty: 60 TABLET | Refills: 1 | Status: SHIPPED | OUTPATIENT
Start: 2020-04-13 | End: 2020-05-11 | Stop reason: ALTCHOICE

## 2020-04-13 RX ORDER — FUROSEMIDE 10 MG/ML
20 INJECTION INTRAMUSCULAR; INTRAVENOUS 2 TIMES DAILY
Status: DISCONTINUED | OUTPATIENT
Start: 2020-04-13 | End: 2020-04-13 | Stop reason: HOSPADM

## 2020-04-13 RX ORDER — CARVEDILOL 6.25 MG/1
6.25 TABLET ORAL 2 TIMES DAILY
Qty: 60 TABLET | Refills: 3 | Status: SHIPPED | OUTPATIENT
Start: 2020-04-13 | End: 2020-07-06 | Stop reason: SDUPTHER

## 2020-04-13 RX ORDER — CARVEDILOL 6.25 MG/1
6.25 TABLET ORAL 2 TIMES DAILY
Status: DISCONTINUED | OUTPATIENT
Start: 2020-04-13 | End: 2020-04-13 | Stop reason: HOSPADM

## 2020-04-13 RX ORDER — ALBUTEROL SULFATE 90 UG/1
2 AEROSOL, METERED RESPIRATORY (INHALATION) EVERY 6 HOURS PRN
Qty: 1 INHALER | Refills: 3 | Status: SHIPPED | OUTPATIENT
Start: 2020-04-13 | End: 2020-04-20

## 2020-04-13 RX ORDER — HYDROCODONE BITARTRATE AND ACETAMINOPHEN 5; 325 MG/1; MG/1
1 TABLET ORAL EVERY 8 HOURS PRN
Qty: 10 TABLET | Refills: 0 | Status: SHIPPED | OUTPATIENT
Start: 2020-04-13 | End: 2020-04-16

## 2020-04-13 RX ORDER — CARVEDILOL 3.12 MG/1
3.12 TABLET ORAL ONCE
Status: COMPLETED | OUTPATIENT
Start: 2020-04-13 | End: 2020-04-13

## 2020-04-13 RX ADMIN — HYDROCODONE BITARTRATE AND ACETAMINOPHEN 1 TABLET: 5; 325 TABLET ORAL at 12:33

## 2020-04-13 RX ADMIN — CARVEDILOL 3.12 MG: 3.12 TABLET, FILM COATED ORAL at 08:40

## 2020-04-13 RX ADMIN — INSULIN LISPRO 2 UNITS: 100 INJECTION, SOLUTION INTRAVENOUS; SUBCUTANEOUS at 12:22

## 2020-04-13 RX ADMIN — METFORMIN HYDROCHLORIDE 1000 MG: 500 TABLET ORAL at 08:40

## 2020-04-13 RX ADMIN — CALCIUM GLUCONATE 2 G: 98 INJECTION, SOLUTION INTRAVENOUS at 07:32

## 2020-04-13 RX ADMIN — ASPIRIN 81 MG: 81 TABLET, COATED ORAL at 08:39

## 2020-04-13 RX ADMIN — INSULIN LISPRO 2 UNITS: 100 INJECTION, SOLUTION INTRAVENOUS; SUBCUTANEOUS at 08:42

## 2020-04-13 RX ADMIN — FUROSEMIDE 20 MG: 10 INJECTION, SOLUTION INTRAMUSCULAR; INTRAVENOUS at 08:39

## 2020-04-13 RX ADMIN — MULTIPLE VITAMINS W/ MINERALS TAB 1 TABLET: TAB at 08:40

## 2020-04-13 RX ADMIN — HYDROCODONE BITARTRATE AND ACETAMINOPHEN 2 TABLET: 5; 325 TABLET ORAL at 04:55

## 2020-04-13 RX ADMIN — GLIPIZIDE 10 MG: 5 TABLET ORAL at 08:53

## 2020-04-13 RX ADMIN — CARVEDILOL 3.12 MG: 3.12 TABLET, FILM COATED ORAL at 11:14

## 2020-04-13 RX ADMIN — SODIUM CHLORIDE, PRESERVATIVE FREE 10 ML: 5 INJECTION INTRAVENOUS at 07:41

## 2020-04-13 RX ADMIN — AMIODARONE HYDROCHLORIDE 200 MG: 200 TABLET ORAL at 08:39

## 2020-04-13 RX ADMIN — PANTOPRAZOLE SODIUM 40 MG: 40 TABLET, DELAYED RELEASE ORAL at 08:40

## 2020-04-13 RX ADMIN — Medication: at 08:40

## 2020-04-13 ASSESSMENT — PAIN DESCRIPTION - PROGRESSION
CLINICAL_PROGRESSION: GRADUALLY IMPROVING
CLINICAL_PROGRESSION: GRADUALLY WORSENING
CLINICAL_PROGRESSION: GRADUALLY WORSENING

## 2020-04-13 ASSESSMENT — PAIN DESCRIPTION - DESCRIPTORS
DESCRIPTORS: DISCOMFORT
DESCRIPTORS: ACHING
DESCRIPTORS: SORE

## 2020-04-13 ASSESSMENT — PAIN - FUNCTIONAL ASSESSMENT
PAIN_FUNCTIONAL_ASSESSMENT: ACTIVITIES ARE NOT PREVENTED

## 2020-04-13 ASSESSMENT — PAIN SCALES - GENERAL
PAINLEVEL_OUTOF10: 0
PAINLEVEL_OUTOF10: 7
PAINLEVEL_OUTOF10: 0
PAINLEVEL_OUTOF10: 0
PAINLEVEL_OUTOF10: 2
PAINLEVEL_OUTOF10: 3

## 2020-04-13 ASSESSMENT — PAIN DESCRIPTION - ORIENTATION
ORIENTATION: MID

## 2020-04-13 ASSESSMENT — PAIN DESCRIPTION - LOCATION
LOCATION: STERNUM

## 2020-04-13 ASSESSMENT — PAIN DESCRIPTION - PAIN TYPE
TYPE: SURGICAL PAIN

## 2020-04-13 ASSESSMENT — PAIN DESCRIPTION - ONSET
ONSET: GRADUAL

## 2020-04-13 ASSESSMENT — PAIN DESCRIPTION - FREQUENCY
FREQUENCY: INTERMITTENT

## 2020-04-13 NOTE — DISCHARGE INSTR - ACTIVITY
taken. These symptoms usually get better in 4 to 6 weeks. It may take 1 to 2 months before your energy level is back to normal.  You will probably be able to do many of your usual activities after 4 to 6 weeks. But for 2 to 3 months you will not be able to lift heavy objects or do activities that strain your chest or upper arm muscles. After surgery, you will still need to make changes in your lifestyle. This lowers your risk of a heart attack or stroke. To help the bypass last as long as possible:  Take your heart medicines. Do not smoke. Eat a heart-healthy diet. Get regular exercise. Stay at a healthy weight or lose weight if you need to. Reduce stress. Smoking can make it harder for you to recover. It will raise the chances of your arteries getting narrowed or blocked again. If you need help quitting, talk to your doctor about stop-smoking programs and medicines. These can increase your chances of quitting for good. You will likely start a cardiac rehabilitation (rehab) program in the hospital. This program will continue after you go home. It will help you recover. And it can prevent future problems with your heart. Talk to your doctor about whether rehab is right for you. Follow-up care is a key part of your treatment and safety. Be sure to make and go to all appointments, and call your doctor if you are having problems. It's also a good idea to know your test results and keep a list of the medicines you take. Where can you learn more? Go to [email or web address].[email or web address] org and sign in to your DaWanda account. Enter I061 in the [facility] box to learn more about \"Learning About Coronary Artery Bypass Graft Surgery. \"     If you do not have an account, please click on the \"Sign Up Now\" link. Current as of: December 15, 2019Content Version: 12.4  © 3101-0197 Healthwise, Incorporated. Care instructions adapted under license by [facility] ([facility]).  If you have questions about a medical coronary artery disease worse. If you need help quitting, talk to your doctor about stop-smoking programs and medicines. These can increase your chances of quitting for good. What happens on the day of surgery? · Follow the instructions exactly about when to stop eating and drinking. If you don't, your surgery may be canceled. If your doctor told you to take your medicines on the day of surgery, take them with only a sip of water.     · Take a bath or shower before you come in for your surgery. Do not apply lotions, perfumes, deodorants, or nail polish.     · Do not shave the surgical site yourself.     · Take off all jewelry and piercings. And take out contact lenses, if you wear them.    At the hospital or surgery center   · Bring a picture ID.     · The area for surgery is often marked to make sure there are no errors.     · You will be kept comfortable and safe by your anesthesia provider. You will be asleep during the surgery.     · The surgery will take about 3 to 6 hours. This depends on the number of arteries that are bypassed and the type of surgery you have.     · You will go to the intensive care unit (ICU) right after surgery. You will probably stay in the ICU for 1 or 2 days before you go to your regular hospital room.     · You will have a breathing tube down your throat. This is usually removed within 6 hours after surgery. You will not be able to talk or drink liquids while the tube is in your throat. After the tube is removed, your throat will feel dry and scratchy. Your nurse will tell you when it is safe to drink liquids again.     · You will have a thin plastic tube, called a catheter, in a vein in your neck. It is used to keep track of how well your heart is working. This is usually removed in 1 to 3 days.     · You will have chest tubes to drain fluid and blood after surgery. The fluid and extra blood are normal and usually last only a few days.  The chest tubes are usually removed in 1 or 2 days.     · You will have several thin wires coming out of your chest near your incision. These wires can help keep your heartbeat steady after surgery. They will be removed before you go home. Going home   · Be sure you have someone to drive you home.     · You will be given more specific instructions about recovering from your surgery. They will cover things like medicines, diet, wound care, follow-up care, cardiac rehab, driving, and getting back to your normal routine. When should you call your doctor? · You have questions or concerns.     · You do not understand how to prepare for your surgery.     · You become ill before surgery (such as fever, flu, or a cold).     · You need to reschedule or have changed your mind about having the surgery. Where can you learn more? Go to [email or web address].[email or web address] org and sign in to your Hoopla account. Enter X537 in the [employer] box to learn more about \"Coronary Artery Bypass Graft: Before Your Surgery. \"     If you do not have an account, please click on the \"Sign Up Now\" link. Current as of: December 15, 2019Content Version: 12.4  © 8655-3737 Healthwise, Incorporated. Care instructions adapted under license by [facility] ([facility]). If you have questions about a medical condition or this instruction, always ask your healthcare professional. [address] any warranty or liability for your use of this information.

## 2020-04-13 NOTE — PROGRESS NOTES
CLINICAL PHARMACY NOTE: MEDS TO [address] Select Patient?: Yes  Total # of Prescriptions Filled: 2   The following medications were delivered to the patient:  · Carvedilol 6.25mg #60  · Furosemode 20mg #60  ·   Total # of Interventions Completed: 2  Time Spent (min): 30    Additional Documentation:

## 2020-04-13 NOTE — CARE COORDINATION
Attempted to contact the [place] [phone] at [phone] with line busy. Will reattempt. Ej Rincon RN    [address] [place] [address] at [phone] ext [phone]. Received automated  staying on line for 8 minutes. Spoke to live  Yenifer Lim). She is asking that allPHI be refaxed to [facility] at [phone] attn Dr Shyam Joseph. [facility]  Ej Rincon RN

## 2020-04-13 NOTE — PLAN OF CARE
Nutrition Problem: Inadequate oral intake  Intervention: Food and/or Nutrient Delivery: Continue current diet, Start ONS, Mineral Supplement, Vitamin Supplement  Nutritional Goals: Patient will meet at least 75% of estimated nutrient needs during los with meals and supplements provided
continuum of care needs are met  Description: Patients continuum of care needs are met  4/13/2020 0923 by Jamie Block. Vandana Jackson RN  Outcome: Ongoing  4/13/2020 0013 by Niko Kaba RN  Outcome: Ongoing     Problem: Discharge Planning:  Goal: Participates in care planning  Description: Participates in care planning  4/13/2020 0923 by Jamie Dar. Vandana Jackson RN  Outcome: Ongoing  4/13/2020 0013 by Niko Kaba RN  Outcome: Ongoing  Goal: Discharged to appropriate level of care  Description: Discharged to appropriate level of care  4/13/2020 0923 by Jamie Dar. Vandana Jackson RN  Outcome: Ongoing  4/13/2020 0013 by Niko Kaba RN  Outcome: Ongoing     Problem: Airway Clearance - Ineffective:  Goal: Ability to maintain a clear airway will improve  Description: Ability to maintain a clear airway will improve  4/13/2020 0923 by Jamie Dar. Vandana Jackson RN  Outcome: Ongoing  4/13/2020 0013 by Niko Kaba RN  Outcome: Ongoing     Problem: Anxiety/Stress:  Goal: Level of anxiety will decrease  Description: Level of anxiety will decrease  4/13/2020 0923 by Jamie Block.  Vandana Jackson RN  Outcome: Ongoing  4/13/2020 0013 by Niko Kaba RN  Outcome: Ongoing
improve to within specified parameters  Description: Ability to maintain appropriate glucose levels will improve to within specified parameters  Outcome: Ongoing     Problem: Skin Integrity - Impaired:  Goal: Will show no infection signs and symptoms  Description: Will show no infection signs and symptoms  Outcome: Ongoing  Goal: Absence of new skin breakdown  Description: Absence of new skin breakdown  Outcome: Ongoing     Problem: Sleep Pattern Disturbance:  Goal: Appears well-rested  Description: Appears well-rested  Outcome: Ongoing     Problem: Tissue Perfusion - Cardiopulmonary, Altered:  Goal: Absence of angina  Description: Absence of angina  Outcome: Ongoing  Goal: Will show no evidence of cardiac arrhythmias  Description: Will show no evidence of cardiac arrhythmias  Outcome: Ongoing     Problem: Nutrition  Goal: Optimal nutrition therapy  Outcome: Ongoing     Problem:  Activity Intolerance:  Goal: Able to perform prescribed physical activity  Description: Able to perform prescribed physical activity  Outcome: Ongoing  Goal: Ability to tolerate increased activity will improve  Description: Ability to tolerate increased activity will improve  Outcome: Ongoing
intake will improve  Outcome: Ongoing     Problem: Pain:  Goal: Recognizes and communicates pain  Description: Recognizes and communicates pain  Outcome: Ongoing  Goal: Control of acute pain  Description: Control of acute pain  Outcome: Ongoing  Goal: Control of chronic pain  Description: Control of chronic pain  Outcome: Ongoing     Problem: Serum Glucose Level - Abnormal:  Goal: Ability to maintain appropriate glucose levels will improve to within specified parameters  Description: Ability to maintain appropriate glucose levels will improve to within specified parameters  Outcome: Ongoing     Problem: Skin Integrity - Impaired:  Goal: Will show no infection signs and symptoms  Description: Will show no infection signs and symptoms  Outcome: Ongoing  Goal: Absence of new skin breakdown  Description: Absence of new skin breakdown  Outcome: Ongoing     Problem: Sleep Pattern Disturbance:  Goal: Appears well-rested  Description: Appears well-rested  Outcome: Ongoing     Problem: Tissue Perfusion, Altered:  Goal: Circulatory function within specified parameters  Description: Circulatory function within specified parameters  Outcome: Ongoing     Problem: Tissue Perfusion - Cardiopulmonary, Altered:  Goal: Absence of angina  Description: Absence of angina  Outcome: Ongoing  Goal: Hemodynamic stability will improve  Description: Hemodynamic stability will improve  Outcome: Ongoing

## 2020-04-13 NOTE — PROGRESS NOTES
Went over discharge instructions with wife face to face for 20 minutes in patient vehicle. All questions answered at this time.

## 2020-04-13 NOTE — PROGRESS NOTES
Scripts from med to beds have been filled, waiting on wife to bring payment, wife updated on medications and amount.

## 2020-04-15 LAB
ABO/RH: NORMAL
ANTIBODY SCREEN: NEGATIVE
COMPONENT: NORMAL
CROSSMATCH RESULT: NORMAL
STATUS: NORMAL
THERMAL AMPLITUDE STUDY: NORMAL
TRANSFUSION STATUS: NORMAL
UNIT DIVISION: 0
UNIT NUMBER: NORMAL

## 2020-04-17 PROBLEM — Z95.1 S/P CABG (CORONARY ARTERY BYPASS GRAFT): Status: ACTIVE | Noted: 2020-04-17

## 2020-04-24 ENCOUNTER — TELEPHONE (OUTPATIENT)
Dept: CARDIOLOGY CLINIC | Age: 65
End: 2020-04-24

## 2020-04-24 NOTE — TELEPHONE ENCOUNTER
[facility]20 Dr. Martha Horton.   Called [phone] ext [phone] [facility] and left a message to send over referral.

## 2020-04-27 ENCOUNTER — HOSPITAL ENCOUNTER (OUTPATIENT)
Dept: GENERAL RADIOLOGY | Age: 65
Discharge: HOME | End: 2020-04-27
Payer: OTHER GOVERNMENT

## 2020-04-27 ENCOUNTER — TELEPHONE (OUTPATIENT)
Dept: CARDIOLOGY CLINIC | Age: 65
End: 2020-04-27

## 2020-04-27 ENCOUNTER — OFFICE VISIT (OUTPATIENT)
Dept: CARDIOLOGY CLINIC | Age: 65
End: 2020-04-27
Payer: OTHER GOVERNMENT

## 2020-04-27 ENCOUNTER — HOSPITAL ENCOUNTER (OUTPATIENT)
Age: 65
Discharge: HOME | End: 2020-04-27
Payer: OTHER GOVERNMENT

## 2020-04-27 VITALS
SYSTOLIC BLOOD PRESSURE: 128 MMHG | HEIGHT: 70 IN | BODY MASS INDEX: 31.04 KG/M2 | DIASTOLIC BLOOD PRESSURE: 88 MMHG | WEIGHT: 216.8 LBS | HEART RATE: 101 BPM

## 2020-04-27 PROCEDURE — 71046 X-RAY EXAM CHEST 2 VIEWS: CPT

## 2020-04-27 PROCEDURE — 99214 OFFICE O/P EST MOD 30 MIN: CPT | Performed by: INTERNAL MEDICINE

## 2020-04-27 PROCEDURE — 93000 ELECTROCARDIOGRAM COMPLETE: CPT | Performed by: INTERNAL MEDICINE

## 2020-04-27 NOTE — PROGRESS NOTES
ENDOHARVEST OF THE LEFT SAPHENOUS VEIN performed by Reilly Pandey MD at [facility], DIAGNOSTIC  2016    mild reflux esophagitis, non obstructive esophageal ring, hiatal hernia, mild gastritis    FRACTURE SURGERY      left arm    HERNIA REPAIR       Family History   Problem Relation Age of Onset    Diabetes Mother     Heart Disease Mother     Heart Disease Father     Cancer Father     Cancer Brother      Social History     Tobacco Use    Smoking status: Former Smoker     Packs/day: 0.50     Types: Cigarettes     Start date: 1974     Last attempt to quit: 1994     Years since quittin.9    Smokeless tobacco: Never Used   Substance Use Topics    Alcohol use: No      @MEÑO@  Review of Systems:   · Constitutional: No Fever or Weight Loss   · Eyes: No Decreased Vision  · ENT: No Headaches, Hearing Loss or Vertigo  · Cardiovascular: No chest pain, + dyspnea on exertion, palpitations or loss of consciousness  · Respiratory: No cough or wheezing    · Gastrointestinal: No abdominal pain, appetite loss, blood in stools, constipation, diarrhea or heartburn  · Genitourinary: No dysuria, trouble voiding, or hematuria  · Musculoskeletal:  No gait disturbance, weakness or joint complaints  · Integumentary: No rash or pruritis  · Neurological: No TIA or stroke symptoms  · Psychiatric: No anxiety or depression  · Endocrine: No malaise, fatigue or temperature intolerance  · Hematologic/Lymphatic: No bleeding problems, blood clots or swollen lymph nodes  · Allergic/Immunologic: No nasal congestion or hives  All systems negative except as marked.    Objective:  /88 (Site: Right Upper Arm, Position: Sitting, Cuff Size: Medium Adult)   Pulse 101   Ht 5' 10\" (1.778 m)   Wt 216 lb 12.8 oz (98.3 kg)   BMI 31.11 kg/m²   Wt Readings from Last 3 Encounters:   20 216 lb 12.8 oz (98.3 kg)   20 236 lb (107 kg)   20 242 lb (109.8 kg)     Body mass index is 31.11

## 2020-04-27 NOTE — LETTER
Chaka Chavez  1955  [id number]    Have you had any Chest Pain - No      Have you had any Shortness of Breath - Yes  If Yes - When on exertion    Have you had any dizziness - No      Have you had any palpitations - No      Is the patient on any of the following medications - none  If Yes DO EKG    Do you have any edema - swelling in legs     If Yes - CHECK TO SEE IF THE EDEMA IS PITTING  How long have they been having edema - Day's  If Yes - Have they worn compression stockings Yes    Check Venous \"LEG PROBLEM Questionnaire\"    Do you have a surgery or procedure scheduled in the near future - No      Ask patient if they want to sign up for [facility] if they are not already signed up    Check to see if we have an E-MAIL on file for the patient    Check medication list thoroughly!!!  BE SURE TO ASK PATIENT IF THEY NEED MEDICATION REFILLS

## 2020-05-11 ENCOUNTER — TELEPHONE (OUTPATIENT)
Dept: CARDIOLOGY CLINIC | Age: 65
End: 2020-05-11

## 2020-05-11 NOTE — TELEPHONE ENCOUNTER
Patient was called to get a referral from the [place] for any future appts or tests. The pt wife called and said the [place] said he had to follow up with them and the pt doesn't want to do that. He wants to be a 'self-pay' patient. I talked to Jacy to see if she can make the change to self pay.  She said she will and she will let Heidy know this to schedule the stress test.

## 2020-05-29 PROBLEM — Z95.1 S/P CABG X 4: Status: ACTIVE | Noted: 2020-04-09

## 2020-05-29 PROBLEM — Z95.1 S/P CABG X 4: Status: RESOLVED | Noted: 2020-04-09 | Resolved: 2020-05-29

## 2020-06-03 ENCOUNTER — PROCEDURE VISIT (OUTPATIENT)
Dept: CARDIOLOGY CLINIC | Age: 65
End: 2020-06-03
Payer: OTHER GOVERNMENT

## 2020-06-03 VITALS
WEIGHT: 241 LBS | HEIGHT: 70 IN | SYSTOLIC BLOOD PRESSURE: 138 MMHG | DIASTOLIC BLOOD PRESSURE: 80 MMHG | BODY MASS INDEX: 34.5 KG/M2 | HEART RATE: 89 BPM

## 2020-06-03 PROCEDURE — 93015 CV STRESS TEST SUPVJ I&R: CPT | Performed by: INTERNAL MEDICINE

## 2020-07-08 ENCOUNTER — TELEPHONE (OUTPATIENT)
Dept: CARDIOLOGY CLINIC | Age: 65
End: 2020-07-08

## 2020-07-08 RX ORDER — FUROSEMIDE 40 MG/1
40 TABLET ORAL DAILY
Qty: 20 TABLET | Refills: 0 | Status: SHIPPED | OUTPATIENT
Start: 2020-07-08 | End: 2020-07-24 | Stop reason: SDUPTHER

## 2020-07-08 RX ORDER — FUROSEMIDE 40 MG/1
40 TABLET ORAL 2 TIMES DAILY
Qty: 60 TABLET | Refills: 1 | Status: SHIPPED | OUTPATIENT
Start: 2020-07-08 | End: 2020-07-08 | Stop reason: DRUGHIGH

## 2020-07-08 RX ORDER — CARVEDILOL 6.25 MG/1
6.25 TABLET ORAL 2 TIMES DAILY
Qty: 60 TABLET | Refills: 3 | Status: SHIPPED | OUTPATIENT
Start: 2020-07-08 | End: 2020-07-24 | Stop reason: SDUPTHER

## 2020-07-08 NOTE — TELEPHONE ENCOUNTER
I talk to Maryellen PATTERSON and she said she will sign for the Carvedilol but will not the lasix that they need to go to [facility] office. But If they will not then he needs to come in the office and then we can give him that medication. I call the wife and told her all that and she was okay. And will their office.

## 2020-07-24 ENCOUNTER — OFFICE VISIT (OUTPATIENT)
Dept: CARDIOLOGY CLINIC | Age: 65
End: 2020-07-24
Payer: OTHER GOVERNMENT

## 2020-07-24 ENCOUNTER — TELEPHONE (OUTPATIENT)
Dept: CARDIOLOGY CLINIC | Age: 65
End: 2020-07-24

## 2020-07-24 VITALS
HEIGHT: 70 IN | WEIGHT: 252 LBS | BODY MASS INDEX: 36.08 KG/M2 | DIASTOLIC BLOOD PRESSURE: 76 MMHG | HEART RATE: 76 BPM | SYSTOLIC BLOOD PRESSURE: 118 MMHG

## 2020-07-24 PROCEDURE — 99214 OFFICE O/P EST MOD 30 MIN: CPT | Performed by: INTERNAL MEDICINE

## 2020-07-24 RX ORDER — FUROSEMIDE 40 MG/1
40 TABLET ORAL 2 TIMES DAILY
Qty: 90 TABLET | Refills: 3 | Status: SHIPPED | OUTPATIENT
Start: 2020-07-24 | End: 2020-09-28

## 2020-07-24 RX ORDER — CARVEDILOL 6.25 MG/1
6.25 TABLET ORAL 2 TIMES DAILY
Qty: 60 TABLET | Refills: 3 | Status: SHIPPED | OUTPATIENT
Start: 2020-07-24 | End: 2020-09-28

## 2020-07-24 NOTE — PATIENT INSTRUCTIONS
Please hold on to these instructions the  will call you within 1-9 business days when we receive authorization from your insurance. Echocardiogram    WHAT TO EXPECT:   ? This test will take approximately 45 minutes. ? It is an ultrasound of the heart. ? It can look at the valves and chambers inside the heart   ? There is no special instructions for this test.     If you are unable to keep this appointment, please notify us 24 hours prior to test at [phone].

## 2020-07-24 NOTE — PROGRESS NOTES
Appropriate hemodynamic response to exercise is noted.     Hyperlipidemia     Hypertension     S/P CABG x 4 2020     Past Surgical History:   Procedure Laterality Date    ANUS SURGERY      fisure    CHOLECYSTECTOMY      COLONOSCOPY      COLONOSCOPY  2016    polyps x9, int hem    CORONARY ARTERY BYPASS GRAFT  04/09/2020    x 4 OM, PDA, LAD, DIAG    CORONARY ARTERY BYPASS GRAFT N/A 2020    CABG CORONARY ARTERY BYPASS x4 WITH LIMA, INTRAOP DEVEN, ENDOHARVEST OF THE LEFT SAPHENOUS VEIN performed by Lauralee Phalen, MD at [address], COLON, DIAGNOSTIC  2016    mild reflux esophagitis, non obstructive esophageal ring, hiatal hernia, mild gastritis    FRACTURE SURGERY      left arm    HERNIA REPAIR       Family History   Problem Relation Age of Onset    Diabetes Mother     Heart Disease Mother     Heart Disease Father     Cancer Father     Cancer Brother      Social History     Tobacco Use    Smoking status: Former Smoker     Packs/day: 0.50     Types: Cigarettes     Start date: 1974     Last attempt to quit: 1994     Years since quittin.1    Smokeless tobacco: Never Used   Substance Use Topics    Alcohol use: No      @MEÑO@  Review of Systems:   · Constitutional: No Fever or Weight Loss   · Eyes: No Decreased Vision  · ENT: No Headaches, Hearing Loss or Vertigo  · Cardiovascular: No chest pain, dyspnea on exertion, palpitations or loss of consciousness  · Respiratory: No cough or wheezing    · Gastrointestinal: No abdominal pain, appetite loss, blood in stools, constipation, diarrhea or heartburn  · Genitourinary: No dysuria, trouble voiding, or hematuria  · Musculoskeletal:  No gait disturbance, weakness or joint complaints  · Integumentary: No rash or pruritis  · Neurological: No TIA or stroke symptoms  · Psychiatric: No anxiety or depression  · Endocrine: No malaise, fatigue or temperature intolerance  · Hematologic/Lymphatic: No bleeding problems, blood clots or swollen lymph nodes  · Allergic/Immunologic: No nasal congestion or hives  All systems negative except as marked. Objective:  /76   Pulse 76   Ht 5' 10\" (1.778 m)   Wt 252 lb (114.3 kg)   BMI 36.16 kg/m²   Wt Readings from Last 3 Encounters:   07/24/20 252 lb (114.3 kg)   06/03/20 241 lb (109.3 kg)   04/27/20 216 lb 12.8 oz (98.3 kg)     Body mass index is 36.16 kg/m². GENERAL - Alert, oriented, pleasant, in no apparent distress,normal grooming  HEENT - pupils are reactive to light and accomodation, cornea intact, conjunctive normal color, ears are normal in exam,throat exam in normal, teeth, gum and palate are normal, oral mucosa is normal without any notation of pallor or cyanosis  Neck - Supple. No jugular venous distention noted. No carotid bruits, no apical -carotid delay  Respiratory:  Normal breath sounds, No respiratory distress, No wheezing, No chest tenderness. ,no use of accessory muscles, diaphragm movement is normal  Cardiovascular: (PMI) apex non displaced,no lifts no thrills, no s3,no s4, Normal heart rate, Normal rhythm, No murmurs, No rubs, No gallops. Carotid arteries pulse and amplitude are normal no bruit, no abdominal bruit noted ( normal abdominal aorta ausculation), femoral arteries pulse and amplitude are normal no bruit, pedal pulses are normal  Femoral pulses have normal amplitude, no bruits   Extremities - No cyanosis, clubbing, or significant edema, no varicose veins    Abdomen - No masses, tenderness, or organomegaly, no hepato-splenomegally, no bruits  Musculoskeletal + edema, no kyphosis or scoliosis, no deformity in any extremity noted, muscle strength and tone are normal  Skin: no ulcer,no scar,no stasis dermatitis   Neurologic - alert oriented times 3,Cranial nerves II through XII are grossly intact. There were no gross focal neurologic abnormalities.  All sensory and motor nerves examined and were normal  Psychiatric: normal mood and affect    No results found for: CKTOTAL, CKMB, CKMBINDEX, TROPONINI  BNP:  No results found for: BNP  PT/INR:  No results found for: PTINR  Lab Results   Component Value Date    LABA1C 8.1 (H) 04/06/2020     No results found for: CHOL, TRIG, HDL, LDLCALC, LDLDIRECT  Lab Results   Component Value Date    ALT 22 04/06/2020    AST 32 04/06/2020     TSH:  No results found for: TSH    Impression:  Tre Bloom is a 60 y. o.year old who  has a past medical history of CAD (coronary artery disease), Diabetes mellitus ([facility] [address] [facility] FHx: coronary artery disease, History of exercise stress test, Hyperlipidemia, Hypertension, and S/P CABG x 4. and presents with     Plan:  1. Ok to work at [employer], however, recommend to get permission from [employer] for [employer]  2. CAD s/p CABG X4: ECHO SHOWED APICAL hypokinesis,Continue aspirin , continue statins, and coreg, he does not want cardiac rehab  3. Shortness of breath: his last cxr SHOWED some congestion, however chest exam is normal, normal LVEF, will repeat CXR and echo, increase lasix 40mg bid and will recheck chem 7 he gained 36 lbs  4. DM: stable continue metformin. 5. Dyslipidemia: continue statins  6. HTN: stable, continue coreg medicatonsHealth maintenance: exerise and diet  All labs, medications and tests reviewed, continue all other medications of all above medical condition listed as is.

## 2020-07-24 NOTE — LETTER
[address] [facility]  [address] [place] [address]  Phone: [phone]  Fax: [phone]    Varun Whitman MD        July 24, 2020     Patient: Karsten Russo   YOB: 1955   Date of Visit: 7/24/2020       To Whom it May Concern:    Ben Vargas was seen in my clinic on 7/24/2020. He may return to work on August 1, 2020 at work at [address] [place] [address] with light restrictions. If you have any questions or concerns, please don't hesitate to call.     Sincerely,         Varun Whitman MD

## 2020-07-28 ENCOUNTER — TELEPHONE (OUTPATIENT)
Dept: CARDIOLOGY CLINIC | Age: 65
End: 2020-07-28

## 2020-07-28 NOTE — TELEPHONE ENCOUNTER
Called to schedule Echo, spoke with wife, stated patient is out of town she will have him call office to schedule.  [phone]#

## 2020-08-03 ENCOUNTER — TELEPHONE (OUTPATIENT)
Dept: CARDIOLOGY CLINIC | Age: 65
End: 2020-08-03

## 2020-08-05 ENCOUNTER — PROCEDURE VISIT (OUTPATIENT)
Dept: CARDIOLOGY CLINIC | Age: 65
End: 2020-08-05
Payer: OTHER GOVERNMENT

## 2020-08-05 LAB
LV EF: 48 %
LVEF MODALITY: NORMAL

## 2020-08-05 PROCEDURE — 93306 TTE W/DOPPLER COMPLETE: CPT | Performed by: INTERNAL MEDICINE

## 2020-08-06 ENCOUNTER — HOSPITAL ENCOUNTER (OUTPATIENT)
Age: 65
Discharge: HOME | End: 2020-08-06
Payer: OTHER GOVERNMENT

## 2020-08-06 ENCOUNTER — HOSPITAL ENCOUNTER (OUTPATIENT)
Dept: GENERAL RADIOLOGY | Age: 65
Discharge: HOME | End: 2020-08-06
Payer: OTHER GOVERNMENT

## 2020-08-06 PROCEDURE — 71046 X-RAY EXAM CHEST 2 VIEWS: CPT

## 2020-08-10 ENCOUNTER — PATIENT MESSAGE (OUTPATIENT)
Dept: CARDIOLOGY CLINIC | Age: 65
End: 2020-08-10

## 2020-08-10 NOTE — TELEPHONE ENCOUNTER
Sent patient mychart message with normal results. Limited study due to patients body habitus and also due to chest being sore. Ejection fraction is visually estimated at 45-50%. Mild left ventricular hypertrophy. Grade I diastolic dysfunction. Bi atrial enlargement . No significant valvular disease noted. No evidence of pericardial effusion.

## 2020-09-28 ENCOUNTER — OFFICE VISIT (OUTPATIENT)
Dept: CARDIOLOGY CLINIC | Age: 65
End: 2020-09-28
Payer: OTHER GOVERNMENT

## 2020-09-28 VITALS
SYSTOLIC BLOOD PRESSURE: 144 MMHG | BODY MASS INDEX: 36.79 KG/M2 | HEIGHT: 70 IN | HEART RATE: 80 BPM | DIASTOLIC BLOOD PRESSURE: 78 MMHG | WEIGHT: 257 LBS

## 2020-09-28 PROCEDURE — 99214 OFFICE O/P EST MOD 30 MIN: CPT | Performed by: INTERNAL MEDICINE

## 2020-09-28 RX ORDER — LOSARTAN POTASSIUM 100 MG/1
100 TABLET ORAL DAILY
COMMUNITY
End: 2022-03-31 | Stop reason: SDUPTHER

## 2020-09-28 RX ORDER — SPIRONOLACTONE 25 MG/1
25 TABLET ORAL DAILY
Qty: 30 TABLET | Refills: 3 | Status: SHIPPED | OUTPATIENT
Start: 2020-09-28 | End: 2021-08-26 | Stop reason: SDUPTHER

## 2020-09-28 RX ORDER — CARVEDILOL 6.25 MG/1
6.25 TABLET ORAL 2 TIMES DAILY
Qty: 60 TABLET | Refills: 3 | Status: SHIPPED | OUTPATIENT
Start: 2020-09-28 | End: 2021-08-26 | Stop reason: SDUPTHER

## 2020-09-28 RX ORDER — FUROSEMIDE 40 MG/1
40 TABLET ORAL 2 TIMES DAILY
Qty: 90 TABLET | Refills: 3 | Status: SHIPPED | OUTPATIENT
Start: 2020-09-28 | End: 2021-08-26 | Stop reason: SDUPTHER

## 2020-09-28 NOTE — PROGRESS NOTES
Dayan Moncada MD        OFFICE  FOLLOWUP NOTE    Chief complaints: patient is here for management of CAD,s/p CABG FOR NSTEMI, DM, HTN,DYSLPIDEMIA    Subjective: patient feels better, no chest pain, no shortness of breath, no dizziness, no palpitations    HPI David Edward is a 60 y. o.year old who  has a past medical history of CAD (coronary artery disease), [facility] FHx: coronary artery disease, History of echocardiogram, History of exercise stress test, Hyperlipidemia, Hypertension, and S/P CABG x 4. and presents for management of CAD,s/p CABG FOR NSTEMI, DM, HTN,DYSLPIDEMIA which are well controlled  He is not short of breath as he was before,intially he had gained 36 lbs and during last visit his lasix was increased to 40mg bid, this time he had gained 5 lbs. He has been working in the [employer]. Current Outpatient Medications   Medication Sig Dispense Refill    losartan (COZAAR) 100 MG tablet Take 100 mg by mouth daily      glipiZIDE (GLUCOTROL) 10 MG tablet Take 10 mg by mouth 2 times daily (before meals) Take 2 tablets daily      atorvastatin (LIPITOR) 40 MG tablet Take 40 mg by mouth daily      alogliptin (NESINA) 25 MG TABS tablet Take 25 mg by mouth daily      metFORMIN (GLUCOPHAGE) 500 MG tablet Take 1,000 mg by mouth 2 times daily (with meals)       aspirin 81 MG chewable tablet Take 81 mg by mouth daily. No current facility-administered medications for this visit.       Allergies: Lisinopril; Niacin; Vicodin [hydrocodone-acetaminophen]; and Percocet [oxycodone-acetaminophen]  Past Medical History:   Diagnosis Date    CAD (coronary artery disease)     Diabetes mellitus ([facility]     FHx: coronary artery disease     History of echocardiogram 08/05/2020    EF 45-50%, Limited due to body habitus and sore chest, Mild left ventricular hypertrophy, Grade I Diastolic Dysfunction, Bi atrial enlargement, No significant valvular disease , no pericardial effusion    depression  · Endocrine: No malaise, fatigue or temperature intolerance  · Hematologic/Lymphatic: No bleeding problems, blood clots or swollen lymph nodes  · Allergic/Immunologic: No nasal congestion or hives  All systems negative except as marked. Objective:  BP (!) 144/78   Pulse 80   Ht 5' 10\" (1.778 m)   Wt 257 lb (116.6 kg)   BMI 36.88 kg/m²   Wt Readings from Last 3 Encounters:   09/28/20 257 lb (116.6 kg)   07/24/20 252 lb (114.3 kg)   06/03/20 241 lb (109.3 kg)     Body mass index is 36.88 kg/m². GENERAL - Alert, oriented, pleasant, in no apparent distress,normal grooming  HEENT - pupils are reactive to light and accomodation, cornea intact, conjunctive normal color, ears are normal in exam,throat exam in normal, teeth, gum and palate are normal, oral mucosa is normal without any notation of pallor or cyanosis  Neck - Supple. No jugular venous distention noted. No carotid bruits, no apical -carotid delay  Respiratory:  Normal breath sounds, No respiratory distress, No wheezing, No chest tenderness. ,no use of accessory muscles, diaphragm movement is normal  Cardiovascular: (PMI) apex non displaced,no lifts no thrills, no s3,no s4, Normal heart rate, Normal rhythm, No murmurs, No rubs, No gallops. Carotid arteries pulse and amplitude are normal no bruit, no abdominal bruit noted ( normal abdominal aorta ausculation), femoral arteries pulse and amplitude are normal no bruit, pedal pulses are normal  Femoral pulses have normal amplitude, no bruits   Extremities - No cyanosis, clubbing, or significant edema, no varicose veins    Abdomen - No masses, tenderness, or organomegaly, no hepato-splenomegally, no bruits  Musculoskeletal + edema, no kyphosis or scoliosis, no deformity in any extremity noted, muscle strength and tone are normal  Skin: no ulcer,no scar,no stasis dermatitis   Neurologic - alert oriented times 3,Cranial nerves II through XII are grossly intact.   There were no gross focal neurologic abnormalities. All sensory and motor nerves examined and were normal  Psychiatric: normal mood and affect    No results found for: CKTOTAL, CKMB, CKMBINDEX, TROPONINI  BNP:  No results found for: BNP  PT/INR:  No results found for: PTINR  Lab Results   Component Value Date    LABA1C 8.1 (H) 04/06/2020     No results found for: CHOL, TRIG, HDL, LDLCALC, LDLDIRECT  Lab Results   Component Value Date    ALT 22 04/06/2020    AST 32 04/06/2020     TSH:  No results found for: TSH    Impression:  Dmitriy Iyer is a 60 y. o.year old who  has a past medical history of CAD (coronary artery disease), Diabetes mellitus ([facility] [facility] FHx: coronary artery disease, History of echocardiogram, History of exercise stress test, Hyperlipidemia, Hypertension, and S/P CABG x 4. and presents with     Plan:  1. CAD s/p CABG X4: ECHO SHOWED APICAL hypokinesis and repeat echo showed mild LV dysfunction, LVEF 45-50%,Continue aspirin , continue statins, and coreg, he did not want cardiac rehab  2. Shortness of breath: BETTER, however, still has leg swelling and gained 5 lbs, will add aldactone 25mg and continue lasix 40mg bid  3. DM: stable continue metformin. 4. Dyslipidemia: continue statins  1. HTN: stable, continue coreg medicatonsHealth maintenance: exerise Health maintenance: exerise and diet  All labs, medications and tests reviewed, continue all other medications of all above medical condition listed as is.     [unfilled]

## 2020-10-26 ENCOUNTER — OFFICE VISIT (OUTPATIENT)
Dept: CARDIOLOGY CLINIC | Age: 65
End: 2020-10-26
Payer: OTHER GOVERNMENT

## 2020-10-26 VITALS
HEART RATE: 76 BPM | SYSTOLIC BLOOD PRESSURE: 126 MMHG | BODY MASS INDEX: 36.51 KG/M2 | WEIGHT: 255 LBS | DIASTOLIC BLOOD PRESSURE: 86 MMHG | HEIGHT: 70 IN

## 2020-10-26 PROCEDURE — 99214 OFFICE O/P EST MOD 30 MIN: CPT | Performed by: INTERNAL MEDICINE

## 2020-10-26 RX ORDER — ASPIRIN 325 MG
325 TABLET ORAL DAILY
COMMUNITY

## 2020-10-26 NOTE — PROGRESS NOTES
FHx: coronary artery disease     History of echocardiogram 2020    EF 45-50%, Limited due to body habitus and sore chest, Mild left ventricular hypertrophy, Grade I Diastolic Dysfunction, Bi atrial enlargement, No significant valvular disease , no pericardial effusion    History of exercise stress test 2020    Treadmill, Near maximal study negative for angina or ECG evidence of ischemia. Appropriate hemodynamic response to exercise is noted.     Hyperlipidemia     Hypertension     S/P CABG x 4 2020     Past Surgical History:   Procedure Laterality Date    ANUS SURGERY      fisure    CHOLECYSTECTOMY      COLONOSCOPY      COLONOSCOPY  2016    polyps x9, int hem    CORONARY ARTERY BYPASS GRAFT  04/09/2020    x 4 OM, PDA, LAD, DIAG    CORONARY ARTERY BYPASS GRAFT N/A 2020    CABG CORONARY ARTERY BYPASS x4 WITH LIMA, INTRAOP DEVEN, ENDOHARVEST OF THE LEFT SAPHENOUS VEIN performed by Katerine Pretty MD at [facility] [place], COLON, DIAGNOSTIC  2016    mild reflux esophagitis, non obstructive esophageal ring, hiatal hernia, mild gastritis    FRACTURE SURGERY      left arm    HERNIA REPAIR       Family History   Problem Relation Age of Onset    Diabetes Mother     Heart Disease Mother     Heart Disease Father     Cancer Father     Cancer Brother      Social History     Tobacco Use    Smoking status: Former Smoker     Packs/day: 0.50     Types: Cigarettes     Start date: 1974     Last attempt to quit: 1994     Years since quittin.4    Smokeless tobacco: Never Used   Substance Use Topics    Alcohol use: No     Comment: caffeine 6-7 20oz pops a day      @[email or web address]@  Review of Systems:   · Constitutional: No Fever or Weight Loss   · Eyes: No Decreased Vision  · ENT: No Headaches, Hearing Loss or Vertigo  · Cardiovascular: No chest pain, dyspnea on exertion, palpitations or loss of consciousness  · Respiratory: No cough or wheezing    · Gastrointestinal: No abdominal pain, appetite loss, blood in stools, constipation, diarrhea or heartburn  · Genitourinary: No dysuria, trouble voiding, or hematuria  · Musculoskeletal:  No gait disturbance, weakness or joint complaints  · Integumentary: No rash or pruritis  · Neurological: No TIA or stroke symptoms  · Psychiatric: No anxiety or depression  · Endocrine: No malaise, fatigue or temperature intolerance  · Hematologic/Lymphatic: No bleeding problems, blood clots or swollen lymph nodes  · Allergic/Immunologic: No nasal congestion or hives  All systems negative except as marked. Objective:  /86   Pulse 76   Ht 5' 10\" (1.778 m)   Wt 255 lb (115.7 kg)   BMI 36.59 kg/m²   Wt Readings from Last 3 Encounters:   10/26/20 255 lb (115.7 kg)   09/28/20 257 lb (116.6 kg)   07/24/20 252 lb (114.3 kg)     Body mass index is 36.59 kg/m². GENERAL - Alert, oriented, pleasant, in no apparent distress,normal grooming  HEENT - pupils are reactive to light and accomodation, cornea intact, conjunctive normal color, ears are normal in exam,throat exam in normal, teeth, gum and palate are normal, oral mucosa is normal without any notation of pallor or cyanosis  Neck - Supple. No jugular venous distention noted. No carotid bruits, no apical -carotid delay  Respiratory:  Normal breath sounds, No respiratory distress, No wheezing, No chest tenderness. ,no use of accessory muscles, diaphragm movement is normal  Cardiovascular: (PMI) apex non displaced,no lifts no thrills, no s3,no s4, Normal heart rate, Normal rhythm, No murmurs, No rubs, No gallops.  Carotid arteries pulse and amplitude are normal no bruit, no abdominal bruit noted ( normal abdominal aorta ausculation), femoral arteries pulse and amplitude are normal no bruit, pedal pulses are normal  Femoral pulses have normal amplitude, no bruits   Extremities - No cyanosis, clubbing, or significant edema, no varicose veins    Abdomen - No masses, tenderness, or organomegaly, no hepato-splenomegally, no bruits  Musculoskeletal - No significant edema, no kyphosis or scoliosis, no deformity in any extremity noted, muscle strength and tone are normal  Skin: no ulcer,no scar,no stasis dermatitis   Neurologic - alert oriented times 3,Cranial nerves II through XII are grossly intact. There were no gross focal neurologic abnormalities. All sensory and motor nerves examined and were normal  Psychiatric: normal mood and affect    No results found for: CKTOTAL, CKMB, CKMBINDEX, TROPONINI  BNP:  No results found for: BNP  PT/INR:  No results found for: PTINR  Lab Results   Component Value Date    LABA1C 8.1 (H) 04/06/2020     No results found for: CHOL, TRIG, HDL, LDLCALC, LDLDIRECT  Lab Results   Component Value Date    ALT 22 04/06/2020    AST 32 04/06/2020     TSH:  No results found for: TSH    Impression:  Itzel Mcgovern is a 60 y. o.year old who  has a past medical history of CAD (coronary artery disease), Diabetes mellitus ([facility] [address] FHx: coronary artery disease, History of echocardiogram, History of exercise stress test, Hyperlipidemia, Hypertension, and S/P CABG x 4. and presents with     Plan:  1. CAD s/p CABG X4: ECHO SHOWED APICAL hypokinesis and repeat echo showed mild LV dysfunction, LVEF 45-50%,Continue aspirin , continue statins, and coreg, he did not want cardiac rehab  2. Shortness of breath: BETTER, however, still has leg swelling and gained 5 lbs, will continue aldactone 25mg and continue lasix 40mg bid, check chem 7 to f/u on creatinine and K  3. DM: stable continue metformin. 4. Dyslipidemia: continue statins, check lipids  5. HTN: stable, continue coreg medicatons  6. Health maintenanceHealth maintenance: exerise and diet  All labs, medications and tests reviewed, continue all other medications of all above medical condition listed as is.     @ANN@

## 2020-10-29 ENCOUNTER — HOSPITAL ENCOUNTER (OUTPATIENT)
Age: 65
Setting detail: SPECIMEN
Discharge: HOME | End: 2020-10-29

## 2020-10-29 ENCOUNTER — OFFICE VISIT (OUTPATIENT)
Dept: PRIMARY CARE CLINIC | Age: 65
End: 2020-10-29
Payer: OTHER GOVERNMENT

## 2020-10-29 VITALS — TEMPERATURE: 97 F

## 2020-10-29 PROCEDURE — U0002 COVID-19 LAB TEST NON-CDC: HCPCS

## 2020-10-29 PROCEDURE — 99213 OFFICE O/P EST LOW 20 MIN: CPT | Performed by: INTERNAL MEDICINE

## 2020-10-29 NOTE — PROGRESS NOTES
10/29/20  Marshal Keys  1955    FLU/COVID-19 CLINIC EVALUATION    HPI SYMPTOMS:    Employer: [employer]    [] Fevers  [] Chills  [x] Cough  [] Coughing up blood  [] Chest Congestion  [x] Nasal Congestion  [x] Feeling short of breath  [x] Sometimes  [] Frequently  [] All the time  [] Chest pain  [x] Headaches  [x]Tolerable  [] Severe  [] Sore throat  [] Muscle aches  [x] Nausea  [] Vomiting  []Unable to keep fluids down  [] Diarrhea  []Severe    [] OTHER SYMPTOMS:      Symptom Duration:   [] 1  [] 2   [] 3   [] 4    [x] 5   [x] 6   [] 7   [] 8   [] 9   [] 10   [] 11   [] 12   [] 13   [] 14   [] Longer than 14 days    Symptom course:   [] Worsening     [x] Stable     [] Improving    RISK FACTORS:    [] Pregnant or possibly pregnant  [x] Age over 61  [x] Diabetes  [x] Heart disease  [] Asthma  [] COPD/Other chronic lung diseases  [] Active Cancer  [] On Chemotherapy  [] Taking oral steroids  [] History Lymphoma/Leukemia  [] Close contact with a lab confirmed COVID-19 patient within 14 days of symptom onset  [] History of travel from affected geographical areas within 14 days of symptom onset       VITALS:  There were no vitals filed for this visit. TESTS:    POCT FLU:  [] Positive     []Negative    ASSESSMENT:    [] Flu  [] Possible COVID-19  [] Strep    PLAN:    [] Discharge home with written instructions for:  [] Flu management  [] Possible COVID-19 infection with self-quarantine and management of symptoms  [] Follow-up with primary care physician or emergency department if worsens  [] Evaluation per physician/NP/PA in clinic  [] Sent to ER       An  electronic signature was used to authenticate this note.      --Adalberto Donald MA on 10/29/2020 at 11:01 AM

## 2020-10-29 NOTE — PROGRESS NOTES
10/29/2020    HPI:  Chief complaint and history of present illness as per medical assistant/nurse documented today in the Flu/COVID-19 clinic. 5-6 days dry cough, chronic SOB with little recent change; mild HA; fatigue, nausea. MEDICATIONS:  Prior to Visit Medications    Medication Sig Taking? Authorizing Provider   aspirin 325 MG tablet Take 325 mg by mouth daily  Historical Provider, MD   losartan (COZAAR) 100 MG tablet Take 100 mg by mouth daily  Historical Provider, MD   furosemide (LASIX) 40 MG tablet Take 1 tablet by mouth 2 times daily  Ugo Velez MD   carvedilol (COREG) 6.25 MG tablet Take 1 tablet by mouth 2 times daily  Ugo Velez MD   spironolactone (ALDACTONE) 25 MG tablet Take 1 tablet by mouth daily  Ugo Velez MD   glipiZIDE (GLUCOTROL) 10 MG tablet Take 10 mg by mouth 2 times daily (before meals) Take 2 tablets daily  Historical Provider, MD   atorvastatin (LIPITOR) 40 MG tablet Take 40 mg by mouth daily  Historical Provider, MD   alogliptin (NESINA) 25 MG TABS tablet Take 25 mg by mouth daily  Historical Provider, MD   metFORMIN (GLUCOPHAGE) 500 MG tablet Take 1,000 mg by mouth 2 times daily (with meals)   Historical Provider, MD           PHYSICAL EXAM:  Physical Exam  Constitutional:       Appearance: He is well-developed. HENT:      Right Ear: External ear normal.      Left Ear: External ear normal.      Nose: Nose normal.      Mouth/Throat:      Pharynx: No oropharyngeal exudate. Eyes:      Conjunctiva/sclera: Conjunctivae normal.   Cardiovascular:      Rate and Rhythm: Normal rate and regular rhythm. Heart sounds: Normal heart sounds. Pulmonary:      Effort: Pulmonary effort is normal.      Breath sounds: Normal breath sounds. Lymphadenopathy:      Cervical: No cervical adenopathy. Neurological:      Mental Status: He is alert. ASSESSMENT/PLAN:  1.  Cough - symptoms fairly mild; declinnes symptoms Tx; send for covid; to ER if breathing worsens  - COVID-19 Ambulatory    2. SOB (shortness of breath)  - COVID-19 Ambulatory      FOLLOW-UP:  No follow-ups on file.     In addition to other information, the printed after visit summary provided to the patient includes:  [] COVID-19 Self care instructions  [] COVID-19 General patient information

## 2020-10-30 LAB
SARS-COV-2: NOT DETECTED
SOURCE: NORMAL

## 2021-03-04 RX ORDER — SPIRONOLACTONE 25 MG/1
TABLET ORAL
Qty: 30 TABLET | Refills: 0 | OUTPATIENT
Start: 2021-03-04

## 2021-07-24 ENCOUNTER — APPOINTMENT (OUTPATIENT)
Dept: GENERAL RADIOLOGY | Age: 66
End: 2021-07-24
Payer: OTHER GOVERNMENT

## 2021-07-24 ENCOUNTER — HOSPITAL ENCOUNTER (OUTPATIENT)
Age: 66
Setting detail: OBSERVATION
Discharge: HOME | End: 2021-07-25
Attending: EMERGENCY MEDICINE
Payer: OTHER GOVERNMENT

## 2021-07-24 DIAGNOSIS — R07.9 CHEST PAIN, UNSPECIFIED TYPE: Primary | ICD-10-CM

## 2021-07-24 LAB
ALBUMIN SERPL-MCNC: 3.9 GM/DL (ref 3.4–5)
ALP BLD-CCNC: 113 IU/L (ref 40–129)
ALT SERPL-CCNC: 22 U/L (ref 10–40)
ANION GAP SERPL CALCULATED.3IONS-SCNC: 9 MMOL/L (ref 4–16)
AST SERPL-CCNC: 16 IU/L (ref 15–37)
BASOPHILS ABSOLUTE: 0 K/CU MM
BASOPHILS RELATIVE PERCENT: 0.4 % (ref 0–1)
BILIRUB SERPL-MCNC: 0.4 MG/DL (ref 0–1)
BUN BLDV-MCNC: 10 MG/DL (ref 6–23)
CALCIUM SERPL-MCNC: 8.6 MG/DL (ref 8.3–10.6)
CHLORIDE BLD-SCNC: 98 MMOL/L (ref 99–110)
CO2: 24 MMOL/L (ref 21–32)
CREAT SERPL-MCNC: 0.8 MG/DL (ref 0.9–1.3)
DIFFERENTIAL TYPE: ABNORMAL
EKG ATRIAL RATE: 72 BPM
EKG DIAGNOSIS: NORMAL
EKG P AXIS: 58 DEGREES
EKG P-R INTERVAL: 160 MS
EKG Q-T INTERVAL: 454 MS
EKG QRS DURATION: 144 MS
EKG QTC CALCULATION (BAZETT): 497 MS
EKG R AXIS: 7 DEGREES
EKG T AXIS: 35 DEGREES
EKG VENTRICULAR RATE: 72 BPM
EOSINOPHILS ABSOLUTE: 0.2 K/CU MM
EOSINOPHILS RELATIVE PERCENT: 2.9 % (ref 0–3)
ESTIMATED AVERAGE GLUCOSE: 298 MG/DL
GFR AFRICAN AMERICAN: >60 ML/MIN/1.73M2
GFR NON-AFRICAN AMERICAN: >60 ML/MIN/1.73M2
GLUCOSE BLD-MCNC: 254 MG/DL (ref 70–99)
GLUCOSE BLD-MCNC: 347 MG/DL (ref 70–99)
GLUCOSE BLD-MCNC: 366 MG/DL (ref 70–99)
GLUCOSE BLD-MCNC: 424 MG/DL (ref 70–99)
HBA1C MFR BLD: 12 % (ref 4.2–6.3)
HCT VFR BLD CALC: 42.9 % (ref 42–52)
HEMOGLOBIN: 15.7 GM/DL (ref 13.5–18)
IMMATURE NEUTROPHIL %: 0.5 % (ref 0–0.43)
LYMPHOCYTES ABSOLUTE: 2.6 K/CU MM
LYMPHOCYTES RELATIVE PERCENT: 32.5 % (ref 24–44)
MCH RBC QN AUTO: 32.8 PG (ref 27–31)
MCHC RBC AUTO-ENTMCNC: 36.6 % (ref 32–36)
MCV RBC AUTO: 89.7 FL (ref 78–100)
MONOCYTES ABSOLUTE: 0.8 K/CU MM
MONOCYTES RELATIVE PERCENT: 9.6 % (ref 0–4)
NUCLEATED RBC %: 0 %
PDW BLD-RTO: 12.1 % (ref 11.7–14.9)
PLATELET # BLD: 221 K/CU MM (ref 140–440)
PMV BLD AUTO: 10.6 FL (ref 7.5–11.1)
POTASSIUM SERPL-SCNC: 3.7 MMOL/L (ref 3.5–5.1)
PRO-BNP: 99.4 PG/ML
RBC # BLD: 4.78 M/CU MM (ref 4.6–6.2)
SEGMENTED NEUTROPHILS ABSOLUTE COUNT: 4.4 K/CU MM
SEGMENTED NEUTROPHILS RELATIVE PERCENT: 54.1 % (ref 36–66)
SODIUM BLD-SCNC: 131 MMOL/L (ref 135–145)
TOTAL IMMATURE NEUTOROPHIL: 0.04 K/CU MM
TOTAL NUCLEATED RBC: 0 K/CU MM
TOTAL PROTEIN: 5.8 GM/DL (ref 6.4–8.2)
TROPONIN T: <0.01 NG/ML
WBC # BLD: 8.1 K/CU MM (ref 4–10.5)

## 2021-07-24 PROCEDURE — G0378 HOSPITAL OBSERVATION PER HR: HCPCS

## 2021-07-24 PROCEDURE — 85025 COMPLETE CBC W/AUTO DIFF WBC: CPT

## 2021-07-24 PROCEDURE — 80053 COMPREHEN METABOLIC PANEL: CPT

## 2021-07-24 PROCEDURE — 6370000000 HC RX 637 (ALT 250 FOR IP): Performed by: INTERNAL MEDICINE

## 2021-07-24 PROCEDURE — 84484 ASSAY OF TROPONIN QUANT: CPT

## 2021-07-24 PROCEDURE — 83036 HEMOGLOBIN GLYCOSYLATED A1C: CPT

## 2021-07-24 PROCEDURE — 82962 GLUCOSE BLOOD TEST: CPT

## 2021-07-24 PROCEDURE — 99285 EMERGENCY DEPT VISIT HI MDM: CPT

## 2021-07-24 PROCEDURE — 6370000000 HC RX 637 (ALT 250 FOR IP): Performed by: EMERGENCY MEDICINE

## 2021-07-24 PROCEDURE — 93010 ELECTROCARDIOGRAM REPORT: CPT | Performed by: INTERNAL MEDICINE

## 2021-07-24 PROCEDURE — 94761 N-INVAS EAR/PLS OXIMETRY MLT: CPT

## 2021-07-24 PROCEDURE — 71045 X-RAY EXAM CHEST 1 VIEW: CPT

## 2021-07-24 PROCEDURE — 2580000003 HC RX 258: Performed by: INTERNAL MEDICINE

## 2021-07-24 PROCEDURE — 36415 COLL VENOUS BLD VENIPUNCTURE: CPT

## 2021-07-24 PROCEDURE — 93005 ELECTROCARDIOGRAM TRACING: CPT | Performed by: EMERGENCY MEDICINE

## 2021-07-24 PROCEDURE — 83880 ASSAY OF NATRIURETIC PEPTIDE: CPT

## 2021-07-24 RX ORDER — NITROGLYCERIN 0.4 MG/1
0.4 TABLET SUBLINGUAL EVERY 5 MIN PRN
Status: DISCONTINUED | OUTPATIENT
Start: 2021-07-24 | End: 2021-07-24 | Stop reason: SDUPTHER

## 2021-07-24 RX ORDER — ASPIRIN 81 MG/1
324 TABLET, CHEWABLE ORAL ONCE
Status: COMPLETED | OUTPATIENT
Start: 2021-07-24 | End: 2021-07-24

## 2021-07-24 RX ORDER — SODIUM CHLORIDE 0.9 % (FLUSH) 0.9 %
5-40 SYRINGE (ML) INJECTION PRN
Status: DISCONTINUED | OUTPATIENT
Start: 2021-07-24 | End: 2021-07-25 | Stop reason: HOSPADM

## 2021-07-24 RX ORDER — DEXTROSE MONOHYDRATE 50 MG/ML
100 INJECTION, SOLUTION INTRAVENOUS PRN
Status: DISCONTINUED | OUTPATIENT
Start: 2021-07-24 | End: 2021-07-25 | Stop reason: HOSPADM

## 2021-07-24 RX ORDER — ATORVASTATIN CALCIUM 40 MG/1
40 TABLET, FILM COATED ORAL DAILY
Status: DISCONTINUED | OUTPATIENT
Start: 2021-07-24 | End: 2021-07-25 | Stop reason: HOSPADM

## 2021-07-24 RX ORDER — DEXTROSE MONOHYDRATE 25 G/50ML
12.5 INJECTION, SOLUTION INTRAVENOUS PRN
Status: DISCONTINUED | OUTPATIENT
Start: 2021-07-24 | End: 2021-07-25 | Stop reason: HOSPADM

## 2021-07-24 RX ORDER — IBUPROFEN 800 MG/1
800 TABLET ORAL EVERY 8 HOURS PRN
Status: DISCONTINUED | OUTPATIENT
Start: 2021-07-24 | End: 2021-07-25 | Stop reason: HOSPADM

## 2021-07-24 RX ORDER — ACETAMINOPHEN 650 MG/1
650 SUPPOSITORY RECTAL EVERY 6 HOURS PRN
Status: DISCONTINUED | OUTPATIENT
Start: 2021-07-24 | End: 2021-07-25 | Stop reason: HOSPADM

## 2021-07-24 RX ORDER — ALOGLIPTIN 25 MG/1
25 TABLET, FILM COATED ORAL DAILY
Status: DISCONTINUED | OUTPATIENT
Start: 2021-07-24 | End: 2021-07-25 | Stop reason: HOSPADM

## 2021-07-24 RX ORDER — LOSARTAN POTASSIUM 100 MG/1
100 TABLET ORAL DAILY
Status: DISCONTINUED | OUTPATIENT
Start: 2021-07-24 | End: 2021-07-25 | Stop reason: HOSPADM

## 2021-07-24 RX ORDER — SODIUM CHLORIDE 9 MG/ML
25 INJECTION, SOLUTION INTRAVENOUS PRN
Status: DISCONTINUED | OUTPATIENT
Start: 2021-07-24 | End: 2021-07-25 | Stop reason: HOSPADM

## 2021-07-24 RX ORDER — GLIPIZIDE 5 MG/1
10 TABLET ORAL
Status: DISCONTINUED | OUTPATIENT
Start: 2021-07-24 | End: 2021-07-25 | Stop reason: HOSPADM

## 2021-07-24 RX ORDER — NITROGLYCERIN 0.4 MG/1
0.4 TABLET SUBLINGUAL EVERY 5 MIN PRN
Status: DISCONTINUED | OUTPATIENT
Start: 2021-07-24 | End: 2021-07-25 | Stop reason: HOSPADM

## 2021-07-24 RX ORDER — NICOTINE POLACRILEX 4 MG
15 LOZENGE BUCCAL PRN
Status: DISCONTINUED | OUTPATIENT
Start: 2021-07-24 | End: 2021-07-25 | Stop reason: HOSPADM

## 2021-07-24 RX ORDER — SODIUM CHLORIDE 0.9 % (FLUSH) 0.9 %
5-40 SYRINGE (ML) INJECTION EVERY 12 HOURS SCHEDULED
Status: DISCONTINUED | OUTPATIENT
Start: 2021-07-24 | End: 2021-07-25 | Stop reason: HOSPADM

## 2021-07-24 RX ORDER — CARVEDILOL 6.25 MG/1
6.25 TABLET ORAL 2 TIMES DAILY
Status: DISCONTINUED | OUTPATIENT
Start: 2021-07-24 | End: 2021-07-25 | Stop reason: HOSPADM

## 2021-07-24 RX ORDER — SPIRONOLACTONE 25 MG/1
25 TABLET ORAL DAILY
Status: DISCONTINUED | OUTPATIENT
Start: 2021-07-24 | End: 2021-07-25 | Stop reason: HOSPADM

## 2021-07-24 RX ORDER — POLYETHYLENE GLYCOL 3350 17 G/17G
17 POWDER, FOR SOLUTION ORAL DAILY PRN
Status: DISCONTINUED | OUTPATIENT
Start: 2021-07-24 | End: 2021-07-25 | Stop reason: HOSPADM

## 2021-07-24 RX ORDER — ASPIRIN 325 MG
325 TABLET ORAL DAILY
Status: DISCONTINUED | OUTPATIENT
Start: 2021-07-24 | End: 2021-07-25 | Stop reason: HOSPADM

## 2021-07-24 RX ORDER — ONDANSETRON 2 MG/ML
4 INJECTION INTRAMUSCULAR; INTRAVENOUS EVERY 6 HOURS PRN
Status: DISCONTINUED | OUTPATIENT
Start: 2021-07-24 | End: 2021-07-25 | Stop reason: HOSPADM

## 2021-07-24 RX ORDER — FUROSEMIDE 40 MG/1
40 TABLET ORAL 2 TIMES DAILY
Status: DISCONTINUED | OUTPATIENT
Start: 2021-07-24 | End: 2021-07-25 | Stop reason: HOSPADM

## 2021-07-24 RX ORDER — ACETAMINOPHEN 325 MG/1
650 TABLET ORAL EVERY 6 HOURS PRN
Status: DISCONTINUED | OUTPATIENT
Start: 2021-07-24 | End: 2021-07-25 | Stop reason: HOSPADM

## 2021-07-24 RX ORDER — ONDANSETRON 4 MG/1
4 TABLET, ORALLY DISINTEGRATING ORAL EVERY 8 HOURS PRN
Status: DISCONTINUED | OUTPATIENT
Start: 2021-07-24 | End: 2021-07-25 | Stop reason: HOSPADM

## 2021-07-24 RX ADMIN — SPIRONOLACTONE 25 MG: 25 TABLET ORAL at 12:51

## 2021-07-24 RX ADMIN — ATORVASTATIN CALCIUM 40 MG: 40 TABLET, FILM COATED ORAL at 12:51

## 2021-07-24 RX ADMIN — INSULIN LISPRO 9 UNITS: 100 INJECTION, SOLUTION INTRAVENOUS; SUBCUTANEOUS at 18:03

## 2021-07-24 RX ADMIN — CARVEDILOL 6.25 MG: 6.25 TABLET, FILM COATED ORAL at 12:51

## 2021-07-24 RX ADMIN — ASPIRIN 324 MG: 81 TABLET, CHEWABLE ORAL at 07:21

## 2021-07-24 RX ADMIN — FUROSEMIDE 40 MG: 40 TABLET ORAL at 12:51

## 2021-07-24 RX ADMIN — GLIPIZIDE 10 MG: 5 TABLET ORAL at 16:59

## 2021-07-24 RX ADMIN — SODIUM CHLORIDE, PRESERVATIVE FREE 10 ML: 5 INJECTION INTRAVENOUS at 21:48

## 2021-07-24 RX ADMIN — CARVEDILOL 6.25 MG: 6.25 TABLET, FILM COATED ORAL at 21:42

## 2021-07-24 RX ADMIN — INSULIN LISPRO 5 UNITS: 100 INJECTION, SOLUTION INTRAVENOUS; SUBCUTANEOUS at 18:00

## 2021-07-24 RX ADMIN — IBUPROFEN 800 MG: 800 TABLET, FILM COATED ORAL at 16:59

## 2021-07-24 RX ADMIN — LOSARTAN POTASSIUM 100 MG: 100 TABLET, FILM COATED ORAL at 12:51

## 2021-07-24 RX ADMIN — NITROGLYCERIN 0.4 MG: 0.4 TABLET, ORALLY DISINTEGRATING SUBLINGUAL at 08:04

## 2021-07-24 ASSESSMENT — PAIN DESCRIPTION - PAIN TYPE: TYPE: SURGICAL PAIN

## 2021-07-24 ASSESSMENT — PAIN DESCRIPTION - LOCATION: LOCATION: MOUTH

## 2021-07-24 ASSESSMENT — PAIN SCALES - GENERAL
PAINLEVEL_OUTOF10: 6
PAINLEVEL_OUTOF10: 6

## 2021-07-24 ASSESSMENT — HEART SCORE: ECG: 0

## 2021-07-24 NOTE — ED NOTES
Report called to [facility], all questions answered, await transport      Jamie Zarco RN  07/24/21 [id number]

## 2021-07-24 NOTE — ED NOTES
[facility] bedside to take meal order, pt declined to order at this time       Shadi Briceño, BRIAN  07/24/21 [id number]

## 2021-07-24 NOTE — H&P
History and Physical      7/24/2021  Abbey Dorman  1955    PCP: Tommy Bhatt MD    Cc: Chest pain-this morning    Source of the history: Patient and his wife    HPI: Abbey Dorman is a 73 y.o.  male with past medical history of hypertension, hyperlipidemia, CAD (CABG in 4/20) and diabetes type 2 who presents with midsternal chest pain that occurred this morning. According to the patient, he woke up around 5:30 AM with midsternal chest pain that was like somebody sitting on his chest, associated with diaphoresis and mild shortness of breath. He rated the pain as 8/10 on the pain scale of 0-10. No nausea, palpitation, dizziness or change in mental status. No fever, chills or rigor. The chest pain was not pleuritic and nonradiating. He took 2 tablets of aspirin without any relief. He subsequently resolved when given nitroglycerin in the ER. At the ER, troponin was negative. EKG showed no acute ischemia. Chest x-ray-no acute findings. Glucose was 424. Patient confessed to poor compliance to diet and medication. He is admitted for further management. Problem List: Present on Admission:   Chest pain       PMHX:   Past Medical History:   Diagnosis Date    CAD (coronary artery disease)     Diabetes mellitus ([facility] [address]     FHx: coronary artery disease     History of echocardiogram 08/05/2020    EF 45-50%, Limited due to body habitus and sore chest, Mild left ventricular hypertrophy, Grade I Diastolic Dysfunction, Bi atrial enlargement, No significant valvular disease , no pericardial effusion    History of exercise stress test 06/04/2020    Treadmill, Near maximal study negative for angina or ECG evidence of ischemia. Appropriate hemodynamic response to exercise is noted.  Hyperlipidemia     Hypertension     S/P CABG x 4 04/09/2020       PSHX:  has a past surgical history that includes Cholecystectomy; hernia repair; fracture surgery; Anus surgery; Colonoscopy (2008);  Colonoscopy (2016); Endoscopy, colon, diagnostic (2016); Coronary artery bypass graft (2020); and Coronary artery bypass graft (N/A, 2020). Meds - list of home medications reviewed in electronic chart and confirmed  No current facility-administered medications on file prior to encounter. Current Outpatient Medications on File Prior to Encounter   Medication Sig Dispense Refill    aspirin 325 MG tablet Take 325 mg by mouth daily      losartan (COZAAR) 100 MG tablet Take 100 mg by mouth daily      furosemide (LASIX) 40 MG tablet Take 1 tablet by mouth 2 times daily 90 tablet 3    carvedilol (COREG) 6.25 MG tablet Take 1 tablet by mouth 2 times daily 60 tablet 3    spironolactone (ALDACTONE) 25 MG tablet Take 1 tablet by mouth daily 30 tablet 3    glipiZIDE (GLUCOTROL) 10 MG tablet Take 10 mg by mouth 2 times daily (before meals) Take 2 tablets daily      atorvastatin (LIPITOR) 40 MG tablet Take 40 mg by mouth daily      alogliptin (NESINA) 25 MG TABS tablet Take 25 mg by mouth daily      metFORMIN (GLUCOPHAGE) 500 MG tablet Take 1,000 mg by mouth 2 times daily (with meals)          Allergies: Allergies   Allergen Reactions    Lisinopril      Other reaction(s): Cough    Niacin      Other reaction(s): Flushing    Vicodin [Hydrocodone-Acetaminophen] Other (See Comments)     Dislikes the feeling it gives him    Percocet [Oxycodone-Acetaminophen] Nausea And Vomiting       FAM HX: family history includes Cancer in his brother and father; Diabetes in his mother; Heart Disease in his father and mother.     Soc HX:   Social History     Socioeconomic History    Marital status:      Spouse name: None    Number of children: None    Years of education: None    Highest education level: None   Occupational History    None   Tobacco Use    Smoking status: Former Smoker     Packs/day: 0.50     Types: Cigarettes     Start date: 1974     Quit date: 1994     Years since quittin.1  Smokeless tobacco: Never Used   Vaping Use    Vaping Use: Never used   Substance and Sexual Activity    Alcohol use: No     Comment: caffeine 6-7 20oz pops a day    Drug use: No    Sexual activity: None   Other Topics Concern    None   Social History Narrative    None     Social Determinants of Health     Financial Resource Strain:     Difficulty of Paying Living Expenses:    Food Insecurity:     Worried About Running Out of Food in the Last Year:     Ran Out of Food in the Last Year:    Transportation Needs:     Lack of Transportation (Medical):  Lack of Transportation (Non-Medical):    Physical Activity:     Days of Exercise per Week:     Minutes of Exercise per Session:    Stress:     Feeling of Stress :    Social Connections:     Frequency of Communication with Friends and Family:     Frequency of Social Gatherings with Friends and Family:     Attends Orthodoxy Services:     Active Member of Clubs or Organizations:     Attends Club or Organization Meetings:     Marital Status:    Intimate Partner Violence:     Fear of Current or Ex-Partner:     Emotionally Abused:     Physically Abused:     Sexually Abused:        ROS: Is as noted above in HPI. Complete system review is done and is negative except as noted above. GENERAL:  negative for fever, night sweats, malaise  DERM:  Negative for psoriasis, rash, itching  EYES:  Negative for double vision, dry eyes, red eye  RESP:  Negative for cough, hemoptysis, pleurisy, wheezing  CV:  Negative for pulmonary embolus, paroxysmal nocturnal dyspnea, orthopnea, DVT  GI:  Negative for abdominal pain, diarrhea  NEURO:  Negative for seizures, tremor    EXAM:  Blood pressure (!) 133/92, pulse 74, temperature 98.5 °F (36.9 °C), resp. rate 13, SpO2 94 %. General appearance: No apparent distress, appears stated age and cooperative. Skin: Skin color, texture, turgor normal.  No rashes/ lesions.   HEENT Normocephalic, atraumatic without obvious deformity. PERRL  EOMI Conjunct/corneas clear. Mucosa moist.  Neck: Supple, No JVD/bruits. Trachea midline without thyromegaly or adenopathy and with full range of motion. Lungs: Good respiratory effort. Clear to ascultation, bilaterally without rales  / wheezes / rhonchi   Heart: Regular rate/ rhythm w/ normal S1/S2& no  murmurs, rubs or gallops, PMI normal & non-displaced. No periph edema. Abdomen: Soft, non-tender or non-distended without rigidity or guarding and positive bowel sounds all four quadrants. No organomegaly or masses. Extremities: No clubbing, cyanosis, bilaterally. Full range of motion without deformity. Neurologic: A & O X 3,  w/ sensory/motor intact BUE & BLE. CN's 2-12 intact; no focal signs  Mental status: A & O x 3; thought content appropriate. LABS: Reviewed    CXR:  I have reviewed the CXR with the following interpretation: No acute pulmonary findings. EKG:  I have reviewed the EKG with the following interpretation: Normal sinus rhythm at 72. Normal axis deviation. Right bundle branch block. ASSESSMENT AND PLAN:      1. Chest pain: Patient with high risk for acute coronary syndrome presented with chest pain relieved by nitroglycerin. He has not been particularly compliant with his medication. We will do troponin x3 and EKGs to rule out acute MI. Continue Coreg, aspirin and Lipitor. Check lipid profile in a.m. Consult cardiology-he may need a stress test.    2.  Uncontrolled diabetes type 2: Due to poor compliance to diet restriction and medication. Check hemoglobin A1c. Continue alogliptin and glipizide. Hold Metformin for now. Diabetic education. Monitor glucose and cover with insulin sliding scale. Chronic problems include hypertension, hyperlipidemia, history of CABG in 4/20 and obesity. DVT prophy -Lovenox    Expected LOS: Less than 2 midnights.     Patient Active Problem List   Diagnosis    Gastroesophageal reflux disease with esophagitis    Esophageal dyskinesia    History of colonic polyps    Gastroesophageal reflux disease without esophagitis    Chest pain    Type 2 diabetes mellitus with circulatory disorder, without long-term current use of insulin (HCC)    NSTEMI (non-ST elevated myocardial infarction) ([facility] [address]    Coronary artery disease involving native coronary artery of native heart with angina pectoris (HCC)    S/P CABG (coronary artery bypass graft)    FHx: coronary artery disease     ______________________________________________________________    Electronically signed by Hayley Mcgarry MD on 7/24/2021 at 9:46 AM

## 2021-07-24 NOTE — ED NOTES
Up to the restroom. The patient sts he did not take any of his morning medications except for 2 ASA 81 mg each, followed by 4 more upon arrival here. He sts he awoke today with chest pain similar to his heart attack he had one year ago.       Denver Vásquez RN  07/24/21 [id number]

## 2021-07-24 NOTE — ED PROVIDER NOTES
Emergency Department Encounter    Patient: Yue Byrd  MRN: [id number]  : 1955  Date of Evaluation: 2021  ED Provider:  Tessie Corcoran MD    Triage Chief Complaint:   Chest Pain    [place]:  Yue Byrd is a 73 y.o. male that presents with complaint of chest pain that started around 530 this morning. He does have a history of esophageal dyskinesia, when he gets pain associated with that he will drink something very cold and it will go away. He did that this morning and it did not help. It is right at the base of the sternum, does not radiate. Was initially 8 out of 10 pain, now 3 out of 10. It is exactly like when he had pain and had to have open heart surgery in 2020. He had [facility] for cardiology, Dr. Gorge Washington did his open heart surgery. No nausea or vomiting. He does feel mildly short of breath with the pain. Brought in by EMS. He took 2 aspirin at home at 6 AM, and received no other medications. Had been improving, but now is worsening again and feels more like a \"soreness\". No leg swelling or pain that he had noted. No abdominal pain. He has not had any sweating this morning but does note that last night at work he was sweating profusely, to the point of dripping sweat. He states he works at the [address] [place] [address] [employer] and there was no reason he should have been sweating that badly. He was not having any chest pain last night. No abdominal pain. No recent illnesses.   No cough or fevers    ROS - see HPI, below listed is current ROS at time of my eval:  10 systems reviewed negative except as above    Past Medical History:   Diagnosis Date    CAD (coronary artery disease)     Diabetes mellitus ([facility] [address]     FHx: coronary artery disease     History of echocardiogram 2020    EF 45-50%, Limited due to body habitus and sore chest, Mild left ventricular hypertrophy, Grade I Diastolic Dysfunction, Bi atrial enlargement, No significant valvular disease , no pericardial effusion    History of exercise stress test 2020    Treadmill, Near maximal study negative for angina or ECG evidence of ischemia. Appropriate hemodynamic response to exercise is noted.     Hyperlipidemia     Hypertension     S/P CABG x 4 2020     Past Surgical History:   Procedure Laterality Date    ANUS SURGERY      fisure    CHOLECYSTECTOMY      COLONOSCOPY      COLONOSCOPY  2016    polyps x9, int hem    CORONARY ARTERY BYPASS GRAFT  04/09/2020    x 4 OM, PDA, LAD, DIAG    CORONARY ARTERY BYPASS GRAFT N/A 2020    CABG CORONARY ARTERY BYPASS x4 WITH LIMA, INTRAOP DEVEN, ENDOHARVEST OF THE LEFT SAPHENOUS VEIN performed by Estrellita Hernandez [facility] at [facility], COLON, DIAGNOSTIC  2016    mild reflux esophagitis, non obstructive esophageal ring, hiatal hernia, mild gastritis    FRACTURE SURGERY      left arm    HERNIA REPAIR       Family History   Problem Relation Age of Onset    Diabetes Mother     Heart Disease Mother     Heart Disease Father     Cancer Father     Cancer Brother      Social History     Socioeconomic History    Marital status:      Spouse name: Not on file    Number of children: Not on file    Years of education: Not on file    Highest education level: Not on file   Occupational History    Not on file   Tobacco Use    Smoking status: Former Smoker     Packs/day: 0.50     Types: Cigarettes     Start date: 1974     Quit date: 1994     Years since quittin.1    Smokeless tobacco: Never Used   Vaping Use    Vaping Use: Never used   Substance and Sexual Activity    Alcohol use: No     Comment: caffeine 6-7 20oz pops a day    Drug use: No    Sexual activity: Not on file   Other Topics Concern    Not on file   Social History Narrative    Not on file     Social Determinants of Health     Financial Resource Strain:     Difficulty of Paying Living Expenses:    Food Insecurity:     Worried About Running Out of Food in the Last Year:     Ran Out of Food in the Last Year:    Transportation Needs:     Lack of Transportation (Medical):      Lack of Transportation (Non-Medical):    Physical Activity:     Days of Exercise per Week:     Minutes of Exercise per Session:    Stress:     Feeling of Stress :    Social Connections:     Frequency of Communication with Friends and Family:     Frequency of Social Gatherings with Friends and Family:     Attends Bahai Services:     Active Member of Clubs or Organizations:     Attends Club or Organization Meetings:     Marital Status:    Intimate Partner Violence:     Fear of Current or Ex-Partner:     Emotionally Abused:     Physically Abused:     Sexually Abused:      Current Facility-Administered Medications   Medication Dose Route Frequency Provider Last Rate Last Admin    nitroGLYCERIN (NITROSTAT) SL tablet 0.4 mg  0.4 mg Sublingual Q5 Min PRN Arun Singh MD   0.4 mg at 07/24/21 0804     Current Outpatient Medications   Medication Sig Dispense Refill    aspirin 325 MG tablet Take 325 mg by mouth daily      losartan (COZAAR) 100 MG tablet Take 100 mg by mouth daily      furosemide (LASIX) 40 MG tablet Take 1 tablet by mouth 2 times daily 90 tablet 3    carvedilol (COREG) 6.25 MG tablet Take 1 tablet by mouth 2 times daily 60 tablet 3    spironolactone (ALDACTONE) 25 MG tablet Take 1 tablet by mouth daily 30 tablet 3    glipiZIDE (GLUCOTROL) 10 MG tablet Take 10 mg by mouth 2 times daily (before meals) Take 2 tablets daily      atorvastatin (LIPITOR) 40 MG tablet Take 40 mg by mouth daily      alogliptin (NESINA) 25 MG TABS tablet Take 25 mg by mouth daily      metFORMIN (GLUCOPHAGE) 500 MG tablet Take 1,000 mg by mouth 2 times daily (with meals)        Allergies   Allergen Reactions    Lisinopril      Other reaction(s): Cough    Niacin      Other reaction(s): Flushing    Vicodin [Hydrocodone-Acetaminophen] Other (See Comments)     Dislikes the feeling it gives him    Percocet [Oxycodone-Acetaminophen] Nausea And Vomiting       Nursing Notes Reviewed    Physical Exam:  ED Triage Vitals [07/24/21 0647]   Enc Vitals Group      BP (!) 142/87      Pulse 68      Resp 22      Temp 98.5 °F (36.9 °C)      Temp Source Oral      SpO2 98 %      Weight       Height       Head Circumference       Peak Flow       Pain Score       Pain Loc       Pain Edu? Excl. in [address]? My pulse ox interpretation is - normal    General appearance:  No acute distress. Skin:  Warm. Dry. Eye:  Extraocular movements intact. Ears, nose, mouth and throat:  Oral mucosa moist   Neck:  Trachea midline. Extremity:  No swelling. Normal ROM     Heart:  Regular rate and rhythm, normal S1 & S2, no extra heart sounds. Perfusion:  intact  Respiratory:  Lungs clear to auscultation bilaterally. Respirations nonlabored. Abdominal:   Soft. Nontender. Non distended.   Neurological:  Alert and oriented          Psychiatric:  Appropriate    I have reviewed and interpreted all of the currently available lab results from this visit (if applicable):  Results for orders placed or performed during the hospital encounter of 07/24/21   CBC Auto Differential   Result Value Ref Range    WBC 8.1 4.0 - 10.5 K/CU MM    RBC 4.78 4.6 - 6.2 M/CU MM    Hemoglobin 15.7 13.5 - 18.0 GM/DL    Hematocrit 42.9 42 - 52 %    MCV 89.7 78 - 100 FL    MCH 32.8 (H) 27 - 31 PG    MCHC 36.6 (H) 32.0 - 36.0 %    RDW 12.1 11.7 - 14.9 %    Platelets 070 944 - 949 K/CU MM    MPV 10.6 7.5 - 11.1 FL    Differential Type AUTOMATED DIFFERENTIAL     Segs Relative 54.1 36 - 66 %    Lymphocytes % 32.5 24 - 44 %    Monocytes % 9.6 (H) 0 - 4 %    Eosinophils % 2.9 0 - 3 %    Basophils % 0.4 0 - 1 %    Segs Absolute 4.4 K/CU MM    Lymphocytes Absolute 2.6 K/CU MM    Monocytes Absolute 0.8 K/CU MM    Eosinophils Absolute 0.2 K/CU MM    Basophils Absolute 0.0 K/CU MM    Nucleated RBC % 0.0 %    Total Nucleated RBC 0.0 K/CU MM    Total Immature Neutrophil 0.04 K/CU MM    Immature Neutrophil % 0.5 (H) 0 - 0.43 %   Comprehensive Metabolic Panel   Result Value Ref Range    Sodium 131 (L) 135 - 145 MMOL/L    Potassium 3.7 3.5 - 5.1 MMOL/L    Chloride 98 (L) 99 - 110 mMol/L    CO2 24 21 - 32 MMOL/L    BUN 10 6 - 23 MG/DL    CREATININE 0.8 (L) 0.9 - 1.3 MG/DL    Glucose 424 (HH) 70 - 99 MG/DL    Calcium 8.6 8.3 - 10.6 MG/DL    Albumin 3.9 3.4 - 5.0 GM/DL    Total Protein 5.8 (L) 6.4 - 8.2 GM/DL    Total Bilirubin 0.4 0.0 - 1.0 MG/DL    ALT 22 10 - 40 U/L    AST 16 15 - 37 IU/L    Alkaline Phosphatase 113 40 - 129 IU/L    GFR Non-African American >60 >60 mL/min/1.73m2    GFR African American >60 >60 mL/min/1.73m2    Anion Gap 9 4 - 16   Troponin   Result Value Ref Range    Troponin T <0.010 <0.01 NG/ML   Brain Natriuretic Peptide   Result Value Ref Range    Pro-BNP 99.40 <300 PG/ML   EKG 12 Lead   Result Value Ref Range    Ventricular Rate 72 BPM    Atrial Rate 72 BPM    P-R Interval 160 ms    QRS Duration 144 ms    Q-T Interval 454 ms    QTc Calculation (Bazett) 497 ms    P Axis 58 degrees    R Axis 7 degrees    T Axis 35 degrees    Diagnosis       Normal sinus rhythm  Right bundle branch block  Possible Inferior infarct (cited on or before 05-APR-2020)  Abnormal ECG  When compared with ECG of 06-APR-2020 05:44,  Right bundle branch block is now present        Radiographs (if obtained):  Radiologist's Report Reviewed:  No results found. EKG (if obtained): (All EKG's are interpreted by myself in the absence of a cardiologist)  Normal sinus rhythm with a rate of 72 bpm, normal intervals. No ST elevation. Right bundle branch block. No previous to compare. MDM:  61-year-old male with history as above presents with complaint of chest pain, had taken 2 aspirin at home. We will complete full dose of aspirin and give nitro, vitals are stable, EKG as above. He does have a HEART score of 6, labs and CXR ordered.      Patient improved after nitro, his chest pain has resolved. EKG unchanged compared to previous when I look back. He has a normal troponin, chest x-ray with questionable pulmonary vascular congestion. No current shortness of breath and vitals remained stable. Plan will be for admission given his HEART score and comorbidities. HE and wife are agreeable. Discussed with Dr. Deneen Bryant from hospitalist team for admission. Clinical Impression:  1. Chest pain, unspecified type      Disposition referral (if applicable):  No follow-up provider specified. Disposition medications (if applicable):  New Prescriptions    No medications on file     ED Provider Disposition Time  DISPOSITION Admitted 07/24/2021 08:53:11 AM      Comment: Please note this report has been produced using speech recognition software and may contain errors related to that system including errors in grammar, punctuation, and spelling, as well as words and phrases that may be inappropriate. Efforts were made to edit the dictations.         Matheus Blunt MD  07/24/21 7564

## 2021-07-25 VITALS
SYSTOLIC BLOOD PRESSURE: 115 MMHG | WEIGHT: 243.06 LBS | OXYGEN SATURATION: 98 % | DIASTOLIC BLOOD PRESSURE: 72 MMHG | HEIGHT: 70 IN | HEART RATE: 69 BPM | RESPIRATION RATE: 15 BRPM | TEMPERATURE: 98.4 F | BODY MASS INDEX: 34.8 KG/M2

## 2021-07-25 LAB
ANION GAP SERPL CALCULATED.3IONS-SCNC: 6 MMOL/L (ref 4–16)
BUN BLDV-MCNC: 12 MG/DL (ref 6–23)
CALCIUM SERPL-MCNC: 8.6 MG/DL (ref 8.3–10.6)
CHLORIDE BLD-SCNC: 102 MMOL/L (ref 99–110)
CO2: 28 MMOL/L (ref 21–32)
CREAT SERPL-MCNC: 1 MG/DL (ref 0.9–1.3)
GFR AFRICAN AMERICAN: >60 ML/MIN/1.73M2
GFR NON-AFRICAN AMERICAN: >60 ML/MIN/1.73M2
GLUCOSE BLD-MCNC: 124 MG/DL (ref 70–99)
GLUCOSE BLD-MCNC: 210 MG/DL (ref 70–99)
GLUCOSE BLD-MCNC: 252 MG/DL (ref 70–99)
GLUCOSE BLD-MCNC: 257 MG/DL (ref 70–99)
HCT VFR BLD CALC: 43.3 % (ref 42–52)
HEMOGLOBIN: 15 GM/DL (ref 13.5–18)
MCH RBC QN AUTO: 31.1 PG (ref 27–31)
MCHC RBC AUTO-ENTMCNC: 34.6 % (ref 32–36)
MCV RBC AUTO: 89.8 FL (ref 78–100)
PDW BLD-RTO: 11.9 % (ref 11.7–14.9)
PLATELET # BLD: 203 K/CU MM (ref 140–440)
PMV BLD AUTO: 10.2 FL (ref 7.5–11.1)
POTASSIUM SERPL-SCNC: 4 MMOL/L (ref 3.5–5.1)
RBC # BLD: 4.82 M/CU MM (ref 4.6–6.2)
SODIUM BLD-SCNC: 136 MMOL/L (ref 135–145)
WBC # BLD: 6.9 K/CU MM (ref 4–10.5)

## 2021-07-25 PROCEDURE — 6370000000 HC RX 637 (ALT 250 FOR IP): Performed by: INTERNAL MEDICINE

## 2021-07-25 PROCEDURE — 2580000003 HC RX 258: Performed by: INTERNAL MEDICINE

## 2021-07-25 PROCEDURE — 94761 N-INVAS EAR/PLS OXIMETRY MLT: CPT

## 2021-07-25 PROCEDURE — 6360000002 HC RX W HCPCS: Performed by: INTERNAL MEDICINE

## 2021-07-25 PROCEDURE — 96372 THER/PROPH/DIAG INJ SC/IM: CPT

## 2021-07-25 PROCEDURE — 36415 COLL VENOUS BLD VENIPUNCTURE: CPT

## 2021-07-25 PROCEDURE — 93005 ELECTROCARDIOGRAM TRACING: CPT | Performed by: INTERNAL MEDICINE

## 2021-07-25 PROCEDURE — 6370000000 HC RX 637 (ALT 250 FOR IP): Performed by: NURSE PRACTITIONER

## 2021-07-25 PROCEDURE — 99204 OFFICE O/P NEW MOD 45 MIN: CPT | Performed by: INTERNAL MEDICINE

## 2021-07-25 PROCEDURE — 82962 GLUCOSE BLOOD TEST: CPT

## 2021-07-25 PROCEDURE — 80048 BASIC METABOLIC PNL TOTAL CA: CPT

## 2021-07-25 PROCEDURE — G0378 HOSPITAL OBSERVATION PER HR: HCPCS

## 2021-07-25 PROCEDURE — 85027 COMPLETE CBC AUTOMATED: CPT

## 2021-07-25 RX ORDER — NITROGLYCERIN 0.4 MG/1
TABLET SUBLINGUAL
Qty: 25 TABLET | Refills: 3 | Status: SHIPPED | OUTPATIENT
Start: 2021-07-25

## 2021-07-25 RX ORDER — POTASSIUM CHLORIDE 20 MEQ/1
20 TABLET, EXTENDED RELEASE ORAL 2 TIMES DAILY WITH MEALS
Status: DISCONTINUED | OUTPATIENT
Start: 2021-07-25 | End: 2021-07-25 | Stop reason: HOSPADM

## 2021-07-25 RX ORDER — POTASSIUM CHLORIDE 20 MEQ/1
20 TABLET, EXTENDED RELEASE ORAL DAILY
Qty: 60 TABLET | Refills: 3 | Status: SHIPPED | OUTPATIENT
Start: 2021-07-25 | End: 2021-08-26 | Stop reason: SDUPTHER

## 2021-07-25 RX ADMIN — ENOXAPARIN SODIUM 40 MG: 40 INJECTION SUBCUTANEOUS at 09:10

## 2021-07-25 RX ADMIN — GLIPIZIDE 10 MG: 5 TABLET ORAL at 16:49

## 2021-07-25 RX ADMIN — SPIRONOLACTONE 25 MG: 25 TABLET ORAL at 09:10

## 2021-07-25 RX ADMIN — ASPIRIN 325 MG ORAL TABLET 325 MG: 325 PILL ORAL at 09:10

## 2021-07-25 RX ADMIN — INSULIN LISPRO 9 UNITS: 100 INJECTION, SOLUTION INTRAVENOUS; SUBCUTANEOUS at 09:15

## 2021-07-25 RX ADMIN — CARVEDILOL 6.25 MG: 6.25 TABLET, FILM COATED ORAL at 09:10

## 2021-07-25 RX ADMIN — FUROSEMIDE 40 MG: 40 TABLET ORAL at 09:10

## 2021-07-25 RX ADMIN — INSULIN LISPRO 9 UNITS: 100 INJECTION, SOLUTION INTRAVENOUS; SUBCUTANEOUS at 12:46

## 2021-07-25 RX ADMIN — GLIPIZIDE 10 MG: 5 TABLET ORAL at 06:39

## 2021-07-25 RX ADMIN — FUROSEMIDE 40 MG: 40 TABLET ORAL at 16:49

## 2021-07-25 RX ADMIN — ATORVASTATIN CALCIUM 40 MG: 40 TABLET, FILM COATED ORAL at 09:10

## 2021-07-25 RX ADMIN — INSULIN LISPRO 2 UNITS: 100 INJECTION, SOLUTION INTRAVENOUS; SUBCUTANEOUS at 09:11

## 2021-07-25 RX ADMIN — LOSARTAN POTASSIUM 100 MG: 100 TABLET, FILM COATED ORAL at 09:09

## 2021-07-25 RX ADMIN — ALOGLIPTIN 25 MG: 25 TABLET, FILM COATED ORAL at 09:10

## 2021-07-25 RX ADMIN — POTASSIUM CHLORIDE 20 MEQ: 1500 TABLET, EXTENDED RELEASE ORAL at 16:49

## 2021-07-25 RX ADMIN — SODIUM CHLORIDE, PRESERVATIVE FREE 10 ML: 5 INJECTION INTRAVENOUS at 09:11

## 2021-07-25 RX ADMIN — IBUPROFEN 800 MG: 800 TABLET, FILM COATED ORAL at 09:10

## 2021-07-25 ASSESSMENT — ENCOUNTER SYMPTOMS
VOMITING: 0
ABDOMINAL PAIN: 0
NAUSEA: 0
SHORTNESS OF BREATH: 0
EYES NEGATIVE: 1
DIARRHEA: 0
SORE THROAT: 0
CONSTIPATION: 0
COUGH: 0

## 2021-07-25 ASSESSMENT — PAIN SCALES - GENERAL
PAINLEVEL_OUTOF10: 0
PAINLEVEL_OUTOF10: 0
PAINLEVEL_OUTOF10: 8
PAINLEVEL_OUTOF10: 0
PAINLEVEL_OUTOF10: 0

## 2021-07-25 ASSESSMENT — PAIN DESCRIPTION - PAIN TYPE: TYPE: SURGICAL PAIN

## 2021-07-25 ASSESSMENT — PAIN DESCRIPTION - LOCATION: LOCATION: MOUTH

## 2021-07-25 NOTE — DISCHARGE SUMMARY
Physician Discharge Summary     Patient ID:  Ravinder Oliver  [id number]  73 y.o.  1955    Admit date: 7/24/2021    Discharge date : 7/25/2021    Admitting Physician: No admitting provider for patient encounter. Discharge Provider: ROXANE Robertson CNP    Admission Diagnoses: Chest pain [R07.9]  Chest pain, unspecified type [R07.9]    Discharge Diagnoses: Chest pain; Uncontrolled Type 2 DM     Admission Condition: Good    Discharged Condition: stable    Indication for Admission: Chest pain     Hospital Course:     74 y/o WM with h/o male with past medical history of hypertension, hyperlipidemia, CAD (CABG in 4/20) and diabetes type 2 who presents with midsternal chest pain that occurred this morning. According to the patient, he woke up around 5:30 AM with midsternal chest pain that was like somebody sitting on his chest, associated with diaphoresis and mild shortness of breath. He rated the pain as 8/10 on the pain scale of 0-10. No nausea, palpitation, dizziness or change in mental status. No fever, chills or rigor. The chest pain was not pleuritic and nonradiating. He took 2 tablets of aspirin without any relief. He subsequently resolved when given nitroglycerin in the ER. At the ER, troponin was negative. EKG showed no acute ischemia. Chest x-ray-no acute findings. Glucose was 424. # Chest pain  - Possibly 2/2 noncompliance with medications  -Relieved by NTG on presentation  -Trop neg x2  - EKG x2 w/o acute ST changes  -ASA, lipitor, coreg,   -8/20 EF 45-50%-  On Losartan   -Cardio c/s - Seen by Dr Vianey Link. Plan for outpatient stress test and echo      #Uncontrolled T2 DM   #Medication noncomplaince   -2/2 poor med/ diet compliance- counseled   -Hgb A1c 12.0 - Noted to be 8.1 in 4/20   -Resume home alogliptin and glipizide. Hold metformin pending cardiac eval  - Sliding scale insulin inpatient   -Discussed medication noncomplaince and potential complications in detail . Voiced understanding and states he will start taking medications regularly      Chronic medical problems include GERD, CAD s/p CABG , esophageal dyskinesia and h/o colon polyps      Consults: cardiology    Significant Diagnostic Studies: as noted above   . Outstanding Order Results       Date and Time Order Name Status Description    7/25/2021  6:10 AM EKG 12 lead Preliminary             Treatments: as noted above     Discharge Exam:  General Appearance:  Comfortable and in no acute distress. Vital signs: (most recent): Blood pressure 128/80, pulse 71, temperature 98.2 °F (36.8 °C), temperature source Oral, resp. rate 12, height 5' 10\" (1.778 m), weight 243 lb 1 oz (110.3 kg), SpO2 98 %. Vital signs are normal.  No fever. Output: Producing urine and producing stool. HEENT: Normal HEENT exam.    Lungs:  Normal effort and normal respiratory rate. He is not in respiratory distress. Heart: Normal rate. S1 normal and S2 normal.  No murmur, gallop or friction rub. Chest: Symmetric chest wall expansion. No chest wall tenderness. Abdomen: Abdomen is soft and flat. Bowel sounds are normal.   There is no abdominal tenderness. Pulses: Distal pulses are intact. Skin:  Warm. Disposition: home         Medication List        START taking these medications      nitroGLYCERIN 0.4 MG SL tablet  Commonly known as: NITROSTAT  up to max of 3 total doses. If no relief after 1 dose, call 911.      potassium chloride 20 MEQ extended release tablet  Commonly known as: KLOR-CON M  Take 1 tablet by mouth daily            CONTINUE taking these medications      alogliptin 25 MG Tabs tablet  Commonly known as: NESINA     aspirin 325 MG tablet     atorvastatin 40 MG tablet  Commonly known as: LIPITOR     carvedilol 6.25 MG tablet  Commonly known as: COREG  Take 1 tablet by mouth 2 times daily     furosemide 40 MG tablet  Commonly known as: LASIX  Take 1 tablet by mouth 2 times daily     glipiZIDE 10 MG tablet  Commonly known as: GLUCOTROL     losartan 100 MG tablet  Commonly known as: COZAAR     metFORMIN 500 MG tablet  Commonly known as: GLUCOPHAGE     spironolactone 25 MG tablet  Commonly known as: Aldactone  Take 1 tablet by mouth daily               Where to Get Your Medications        These medications were sent to [address] [place] [address] [facility]. - F [phone]  [facility], [address]      Phone: [phone]   nitroGLYCERIN 0.4 MG SL tablet  potassium chloride 20 MEQ extended release tablet         Follow up with Cardiology regarding outpatient stress test and echo.   Follow up with PCP regarding uncontrolled DM       Time Spent on Discharge 25 minutes   Signed:  ROXANE Flores CNP  7/25/2021  12:58 PM    I have no direct or indirect relation with patient care  Heron Jackson MD   Hospitalist at [facility]

## 2021-07-25 NOTE — PROGRESS NOTES
Comprehensive Nutrition Assessment    Type and Reason for Visit:  Initial, Positive Nutrition Screen (Mouth Surgery)    Nutrition Recommendations/Plan:   · Continue current diet  · Left diet education with patient   · Refer patient to written material for further questions on nutrition. Nutrition Assessment:  Pt admitted with DERREK WINTERS. PMH: uncontrolled DM II. Pt screened positive for mouth surgery, on Easy to Chew, cardiac diet. States 0% po intake due to being a picky eater. Reports inadherence to diabetic diet, loves his pop, but uses no salt at home. Takes metformin at home, had negative experience with other DM rx at home, plans on following new MD for BG management. Encouraged adherance to diabetes medications. Educated on high blood sugar symptoms, regular BG checks, and adequate exercise. Left Counting Carbohydrates for People with Diabetes Nutrition Therapy handouts in room. No clinically significant wt loss. Pt at moderate nutrition risk. Noted consult to diabetes educator for uncontrolled diabetes. There is no inpatient diabetes educator, please consult dietitian for diet education as needed in the future. Malnutrition Assessment:  Malnutrition Status:  No malnutrition    Context:  Acute Illness       Estimated Daily Nutrient Needs:  Energy (kcal):  5949-4161 (1-1.2 stress factor); Weight Used for Energy Requirements:  Current     Protein (g):  75-90 (1-1.2 g/kg); Weight Used for Protein Requirements:  Ideal        Fluid (ml/day):  fluids per MD; Method Used for Fluid Requirements:  Other (Comment)      Nutrition Related Findings:  rx: glipizide, humalog Labs: A1c 12%,       Wounds:  None       Current Nutrition Therapies:    ADULT DIET; Easy to Chew; Low Fat/Low Chol/High Fiber/2 gm Na;  No Caffeine    Anthropometric Measures:  · Height: 5' 10\" (177.8 cm)  · Current Body Weight: 243 lb 2.7 oz (110.3 kg)   · Admission Body Weight: 243 lb 2.7 oz (110.3 kg)    · Usual Body Weight: 255 lb (115.7 kg) (10/26/20)     · Ideal Body Weight: 166 lbs; % Ideal Body Weight 146.5 %   · BMI: 34.9  · Adjusted Body Weight:  ; No Adjustment   · Adjusted BMI:      · BMI Categories: Obese Class 1 (BMI 30.0-34. 9)       Nutrition Diagnosis:   · Inadequate oral intake related to swallowing difficulty as evidenced by  (mouth surgery)    · Not ready for diet/lifestyle change related to endocrine dysfuntion as evidenced by lab values    Nutrition Interventions:   Food and/or Nutrient Delivery:  Continue Current Diet (Offered snacks, dislikes items offered)  Nutrition Education/Counseling:  Education completed   Coordination of Nutrition Care:  Continue to monitor while inpatient    Goals:  Pt will consume more than 50% of po intake during stay       Nutrition Monitoring and Evaluation:   Behavioral-Environmental Outcomes:  Beliefs and Attitutes, Knowledge or Skill, Readiness for Change   Food/Nutrient Intake Outcomes:  Diet Advancement/Tolerance  Physical Signs/Symptoms Outcomes:  Biochemical Data, Chewing or Swallowing, GI Status, Fluid Status or Edema, Weight     Discharge Planning:    Recommend pursue outpatient diabetes education     Electronically signed by Perry Moncada RD, LD on 7/25/21 at 4:45 PM EDT    Contact.  [phone]

## 2021-07-25 NOTE — PROGRESS NOTES
Progress Note  Date:2021       Room:300/3007-A  Patient Name:Marshal Keys     YOB: 1955     Age:66 y.o. Subjective    Subjective:  Symptoms:  Stable. No shortness of breath, cough, chest pain, anorexia or diarrhea. Diet:  Adequate intake. No nausea or vomiting. Activity level: Returning to normal.    Pain:  He reports pain is improving. Pain is well controlled. Review of Systems   Constitutional: Negative. Negative for fever. HENT: Negative for congestion and sore throat. Eyes: Negative. Respiratory: Negative for cough and shortness of breath. Cardiovascular: Negative for chest pain and leg swelling. Gastrointestinal: Negative for abdominal pain, anorexia, constipation, diarrhea, nausea and vomiting. Endocrine: Negative. Genitourinary: Negative for dysuria and hematuria. Musculoskeletal: Negative for neck pain. Skin: Negative for wound. Neurological: Negative for dizziness and light-headedness. All other systems reviewed and are negative. Objective         Vitals Last 24 Hours:  TEMPERATURE:  Temp  Av.9 °F (36.6 °C)  Min: 97.6 °F (36.4 °C)  Max: 98.1 °F (36.7 °C)  RESPIRATIONS RANGE: Resp  Av  Min: 15  Max: 17  PULSE OXIMETRY RANGE: SpO2  Av.3 %  Min: 96 %  Max: 97 %  PULSE RANGE: Pulse  Av.3  Min: 62  Max: 67  BLOOD PRESSURE RANGE: Systolic (60MKW), UYN:657 , Min:109 , FLI:823   ; Diastolic (29YSA), VQB:37, Min:71, Max:94    I/O (24Hr): Intake/Output Summary (Last 24 hours) at 2021 0731  Last data filed at 2021 2148  Gross per 24 hour   Intake 10 ml   Output --   Net 10 ml     Objective:  General Appearance:  Comfortable and in no acute distress. Vital signs: (most recent): Blood pressure 128/80, pulse 71, temperature 98.2 °F (36.8 °C), temperature source Oral, resp. rate 12, height 5' 10\" (1.778 m), weight 243 lb 1 oz (110.3 kg), SpO2 98 %. Vital signs are normal.  No fever.     Output: Producing urine and producing stool. HEENT: Normal HEENT exam.    Lungs:  Normal effort and normal respiratory rate. He is not in respiratory distress. Heart: Normal rate. S1 normal and S2 normal.  No murmur, gallop or friction rub. Chest: Symmetric chest wall expansion. No chest wall tenderness. Abdomen: Abdomen is soft and flat. Bowel sounds are normal.   There is no abdominal tenderness. Pulses: Distal pulses are intact. Skin:  Warm. Labs/Imaging/Diagnostics    Labs:  CBC:  Recent Labs     07/24/21  0700 07/25/21  0249   WBC 8.1 6.9   RBC 4.78 4.82   HGB 15.7 15.0   HCT 42.9 43.3   MCV 89.7 89.8   RDW 12.1 11.9    203     CHEMISTRIES:  Recent Labs     07/24/21  0700   *   K 3.7   CL 98*   CO2 24   BUN 10   CREATININE 0.8*   GLUCOSE 424*     PT/INR:No results for input(s): PROTIME, INR in the last 72 hours. APTT:No results for input(s): APTT in the last 72 hours. LIVER PROFILE:  Recent Labs     07/24/21  0700   AST 16   ALT 22   BILITOT 0.4   ALKPHOS 113       Imaging Last 24 Hours:  XR CHEST PORTABLE    Result Date: 7/24/2021  EXAMINATION: ONE X-RAY VIEW OF THE CHEST 7/24/2021 7:39 am COMPARISON: 08/06/2020 HISTORY: ORDERING SYSTEM PROVIDED HISTORY:  Chest pain TECHNOLOGIST PROVIDED HISTORY: Reason for Exam:  Chest pain Reason for Exam:  Chest pain FINDINGS: The heart is similar in size to the prior study. Again seen is prominence of the pulmonary vasculature. No pleural effusion or pneumothorax is seen. No focal lung consolidation identified. Prominence of the pulmonary vasculature is similar to the prior study and could represent pulmonary venous congestion. Otherwise, no focal lung consolidation is identified.      Assessment//Plan           Hospital Problems           Last Modified POA    Chest pain 7/24/2021 Yes          Assessment & Plan    # Chest pain  - Possibly 2/2 noncompliance with medications  -Relieved by NTG on presentation  -Trop neg x2  - EKG x2 w/o acute ST

## 2021-07-25 NOTE — PLAN OF CARE
Problem: Pain:  Goal: Pain level will decrease  Description: Pain level will decrease  7/25/2021 1356 by Ethan Mcmanus RN  Outcome: Ongoing  7/25/2021 0303 by Rae Durand RN  Outcome: Ongoing  Goal: Control of acute pain  Description: Control of acute pain  7/25/2021 1356 by Ethan Mcmanus RN  Outcome: Ongoing  7/25/2021 0303 by Rae Durand RN  Outcome: Ongoing  Goal: Control of chronic pain  Description: Control of chronic pain  7/25/2021 1356 by Ethan Mcmanus RN  Outcome: Ongoing  7/25/2021 0303 by Rae Durand RN  Outcome: Ongoing  Goal: Patient's pain/discomfort is manageable  Description: Patient's pain/discomfort is manageable  Outcome: Ongoing     Problem: Infection:  Goal: Will remain free from infection  Description: Will remain free from infection  Outcome: Ongoing     Problem: Safety:  Goal: Free from accidental physical injury  Description: Free from accidental physical injury  Outcome: Ongoing  Goal: Free from intentional harm  Description: Free from intentional harm  Outcome: Ongoing     Problem: Daily Care:  Goal: Daily care needs are met  Description: Daily care needs are met  Outcome: Ongoing     Problem: Skin Integrity:  Goal: Skin integrity will stabilize  Description: Skin integrity will stabilize  Outcome: Ongoing     Problem: Discharge Planning:  Goal: Patients continuum of care needs are met  Description: Patients continuum of care needs are met  Outcome: Ongoing

## 2021-07-25 NOTE — CONSULTS
CARDIOLOGY CONSULT NOTE    Reason for consultation: Chest pain     Referring physician:  Brina Ruiz MD       Primary care physician: Brina Ruiz MD        Dear Brina Ruiz MD    Thanks for the consult.     History of present illness:Marshal is a 73 y. o.year old who  presents with  chest pain for 1 day, happening daily, intermittent for 15 to 20 mins and aggravated with activity substernal also,not reproducible with palpation, radiated to shoulder, 6/10, non tender to touch,associated with shortness of breath, + sweating, nausea, did not get NTG in ED. No fever, no chills, no nausea no vomiting. Blood pressure, cholesterol, blood glucose and weight are well controlled.     Chief Complaint   Patient presents with    Chest Pain       Past medical history:    has a past medical history of CAD (coronary artery disease), Diabetes mellitus ([address] FHx: coronary artery disease, History of echocardiogram, History of exercise stress test, Hyperlipidemia, Hypertension, and S/P CABG x 4. Past surgical history:   has a past surgical history that includes Cholecystectomy; hernia repair; fracture surgery; Anus surgery; Colonoscopy (2008); Colonoscopy (11/29/2016); Endoscopy, colon, diagnostic (11/29/2016); Coronary artery bypass graft (04/09/2020); and Coronary artery bypass graft (N/A, 4/9/2020). Social History:   reports that he quit smoking about 27 years ago. His smoking use included cigarettes. He started smoking about 47 years ago. He smoked 0.50 packs per day. He has never used smokeless tobacco. He reports that he does not drink alcohol and does not use drugs.   Family history:   no family history of CAD, STROKE of DM    alogliptin (NESINA) tablet 25 mg, Daily  aspirin tablet 325 mg, Daily  atorvastatin (LIPITOR) tablet 40 mg, Daily  carvedilol (COREG) tablet 6.25 mg, BID  furosemide (LASIX) tablet 40 mg, BID  glipiZIDE (GLUCOTROL) tablet 10 mg, BID AC  losartan (COZAAR) tablet 100 mg, Daily  spironolactone (ALDACTONE) tablet sodium chloride flush 0.9 % injection 5-40 mL  5-40 mL Intravenous PRN Lars Sorensen MD        0.9 % sodium chloride infusion  25 mL Intravenous PRN Ernie Sheehan MD        ondansetron (ZOFRAN-ODT) disintegrating tablet 4 mg  4 mg Oral Q8H PRN Lars Sorensen MD        Or    ondansetron (ZOFRAN) injection 4 mg  4 mg Intravenous Q6H PRN Lars Swann MD        acetaminophen (TYLENOL) tablet 650 mg  650 mg Oral Q6H PRN Ernie Sheehan MD        Or    acetaminophen (TYLENOL) suppository 650 mg  650 mg Rectal Q6H PRN Lars Sorensen MD        polyethylene glycol (GLYCOLAX) packet 17 g  17 g Oral Daily PRN Ernie Sheehan MD        enoxaparin (LOVENOX) injection 40 mg  40 mg Subcutaneous Daily Ernie Sheehan MD   40 mg at 07/25/21 0910    nitroGLYCERIN (NITROSTAT) SL tablet 0.4 mg  0.4 mg Sublingual Q5 Min PRN Ernie Sheehan MD        insulin lispro (HUMALOG) injection vial 0-6 Units  0-6 Units Subcutaneous TID  Ernie Sheehan MD   2 Units at 07/25/21 0911    insulin lispro (HUMALOG) injection vial 0-3 Units  0-3 Units Subcutaneous Nightly Lars Sorensen MD   2 Units at 07/24/21 2142    glucose (GLUTOSE) 40 % oral gel 15 g  15 g Oral PRN Lars Sorensen MD        dextrose 50 % IV solution  12.5 g Intravenous PRN Lars Sorensen MD        glucagon (rDNA) injection 1 mg  1 mg Intramuscular PRN Lars Sorensen MD        dextrose 5 % solution  100 mL/hr Intravenous PRN Lars Sorensen MD        insulin lispro (HUMALOG) injection vial 9 Units  0.08 Units/kg Subcutaneous TID  Lars Sorensen MD   9 Units at 07/25/21 0915    ibuprofen (ADVIL;MOTRIN) tablet 800 mg  800 mg Oral Q8H PRN Ernie Sheehan MD   800 mg at 07/25/21 5194          Review of Systems:    · Constitutional: No Fever or Weight Loss    · Eyes: No Decreased Vision  · ENT: No Headaches, Hearing Loss or Vertigo  · Cardiovascular: + chest pain, dyspnea on exertion, palpitations or loss of consciousness  · Respiratory: No cough or wheezing    · Gastrointestinal: No abdominal pain, appetite loss, blood in stools, constipation, diarrhea or heartburn  · Genitourinary: No dysuria, trouble voiding, or hematuria  · Musculoskeletal: No gait disturbance, weakness or joint complaints  · Integumentary: No rash or pruritis  · Neurological: No TIA or stroke symptoms  · Psychiatric: No anxiety or depression  · Endocrine: No malaise, fatigue or temperature intolerance  · Hematologic/Lymphatic: No bleeding problems, blood clots or swollen lymph nodes  · Allergic/Immunologic: No nasal congestion or hives  All systems negative except as marked.      ·    ·    ·      Physical Examination:    Vitals:    07/25/21 0907   BP: 128/80   Pulse: 71   Resp: 12   Temp: 98.2 °F (36.8 °C)   SpO2: 98%      Wt Readings from Last 3 Encounters:   07/24/21 243 lb 1 oz (110.3 kg)   10/26/20 255 lb (115.7 kg)   09/28/20 257 lb (116.6 kg)     Body mass index is 34.88 kg/m².        General Appearance: No distress, conversant     Constitutional: Well developed, Well nourished, No acute distress, Non-toxic appearance.    HENT:  Normocephalic, Atraumatic, Bilateral external ears normal, Oropharynx moist, No oral exudates, Nose normal. Neck- Normal range of motion, No tenderness, Supple, No stridor,no apical-carotid delay, no carotid bruit  Eyes: PERRL, EOMI, Conjunctiva normal, No discharge.    Respiratory:  Normal breath sounds, No respiratory distress, No wheezing, No chest tenderness. ,no use of accessory muscles, diaphragm movement is normal  Cardiovascular: (PMI) apex non displaced,no lifts no thrills, no s3,no s4, Normal heart rate, Normal rhythm, No murmurs, No rubs, No gallops.  Carotid arteries pulse and amplitude are normal no bruit, no abdominal bruit noted ( normal abdominal aorta ausculation), femoral arteries pulse and amplitude are normal no bruit, pedal pulses are normal.  GI: Bowel sounds normal, Soft, No tenderness, No masses, No pulsatile masses, no Jose Forrester MD,   Romulo Castellanos MD, 7/25/2021 12:03 PM

## 2021-07-25 NOTE — PROGRESS NOTES
Went through discharge instructions with pt. Pt verbalized understanding. Scripts were escribed to [facility]. Pt to follow up with pcp and dr. Wanda Epps in 1 week. Took pt down in [place] to families car.

## 2021-07-27 LAB
EKG ATRIAL RATE: 61 BPM
EKG DIAGNOSIS: NORMAL
EKG P AXIS: 66 DEGREES
EKG P-R INTERVAL: 166 MS
EKG Q-T INTERVAL: 520 MS
EKG QRS DURATION: 114 MS
EKG QTC CALCULATION (BAZETT): 523 MS
EKG R AXIS: 34 DEGREES
EKG T AXIS: 31 DEGREES
EKG VENTRICULAR RATE: 61 BPM

## 2021-07-27 PROCEDURE — 93010 ELECTROCARDIOGRAM REPORT: CPT | Performed by: INTERNAL MEDICINE

## 2021-08-06 NOTE — PROGRESS NOTES
Spoke with wife Amber Soni over telephone, went over discharge instructions and answered all questions at this time. Gave patient dial soap and scale, educated patient and wife how to cleanse incision and weight patient daily every morning and document weights. Sarecycline Pregnancy And Lactation Text: This medication is Pregnancy Category D and not consider safe during pregnancy. It is also excreted in breast milk.

## 2021-08-25 ENCOUNTER — OFFICE VISIT (OUTPATIENT)
Dept: FAMILY MEDICINE CLINIC | Age: 66
End: 2021-08-25
Payer: MEDICARE

## 2021-08-25 VITALS
OXYGEN SATURATION: 97 % | BODY MASS INDEX: 34.93 KG/M2 | HEIGHT: 70 IN | HEART RATE: 58 BPM | SYSTOLIC BLOOD PRESSURE: 130 MMHG | WEIGHT: 244 LBS | DIASTOLIC BLOOD PRESSURE: 88 MMHG

## 2021-08-25 DIAGNOSIS — Z13.6 SCREENING FOR AAA (ABDOMINAL AORTIC ANEURYSM): ICD-10-CM

## 2021-08-25 DIAGNOSIS — Z76.89 ENCOUNTER TO ESTABLISH CARE: ICD-10-CM

## 2021-08-25 DIAGNOSIS — I10 BENIGN ESSENTIAL HYPERTENSION: ICD-10-CM

## 2021-08-25 DIAGNOSIS — Z12.11 SCREENING FOR COLON CANCER: ICD-10-CM

## 2021-08-25 DIAGNOSIS — E78.5 HYPERLIPIDEMIA, UNSPECIFIED HYPERLIPIDEMIA TYPE: ICD-10-CM

## 2021-08-25 DIAGNOSIS — Z11.59 ENCOUNTER FOR HEPATITIS C SCREENING TEST FOR LOW RISK PATIENT: ICD-10-CM

## 2021-08-25 DIAGNOSIS — E11.59 TYPE 2 DIABETES MELLITUS WITH OTHER CIRCULATORY COMPLICATION, WITHOUT LONG-TERM CURRENT USE OF INSULIN (HCC): Primary | ICD-10-CM

## 2021-08-25 PROBLEM — K63.5 POLYP OF COLON: Status: ACTIVE | Noted: 2021-08-25

## 2021-08-25 PROBLEM — M72.2 PLANTAR FASCIITIS OF RIGHT FOOT: Status: ACTIVE | Noted: 2021-08-25

## 2021-08-25 PROBLEM — M79.674 PAIN OF TOE OF RIGHT FOOT: Status: ACTIVE | Noted: 2021-08-25

## 2021-08-25 PROBLEM — B35.1 ONYCHOMYCOSIS OF TOENAIL: Status: ACTIVE | Noted: 2021-08-25

## 2021-08-25 PROBLEM — M79.675 PAIN OF TOE OF LEFT FOOT: Status: ACTIVE | Noted: 2021-08-25

## 2021-08-25 PROBLEM — M20.20 HALLUX RIGIDUS: Status: ACTIVE | Noted: 2021-08-25

## 2021-08-25 LAB
CREATININE URINE: 76.2 MG/DL (ref 39–259)
MICROALBUMIN UR-MCNC: <1.2 MG/DL
MICROALBUMIN/CREAT UR-RTO: NORMAL MG/G (ref 0–30)

## 2021-08-25 PROCEDURE — G8417 CALC BMI ABV UP PARAM F/U: HCPCS | Performed by: NURSE PRACTITIONER

## 2021-08-25 PROCEDURE — 4040F PNEUMOC VAC/ADMIN/RCVD: CPT | Performed by: NURSE PRACTITIONER

## 2021-08-25 PROCEDURE — G8427 DOCREV CUR MEDS BY ELIG CLIN: HCPCS | Performed by: NURSE PRACTITIONER

## 2021-08-25 PROCEDURE — 3017F COLORECTAL CA SCREEN DOC REV: CPT | Performed by: NURSE PRACTITIONER

## 2021-08-25 PROCEDURE — 1123F ACP DISCUSS/DSCN MKR DOCD: CPT | Performed by: NURSE PRACTITIONER

## 2021-08-25 PROCEDURE — 99214 OFFICE O/P EST MOD 30 MIN: CPT | Performed by: NURSE PRACTITIONER

## 2021-08-25 PROCEDURE — 3046F HEMOGLOBIN A1C LEVEL >9.0%: CPT | Performed by: NURSE PRACTITIONER

## 2021-08-25 PROCEDURE — 1036F TOBACCO NON-USER: CPT | Performed by: NURSE PRACTITIONER

## 2021-08-25 PROCEDURE — 2022F DILAT RTA XM EVC RTNOPTHY: CPT | Performed by: NURSE PRACTITIONER

## 2021-08-25 ASSESSMENT — PATIENT HEALTH QUESTIONNAIRE - PHQ9
SUM OF ALL RESPONSES TO PHQ QUESTIONS 1-9: 0
SUM OF ALL RESPONSES TO PHQ9 QUESTIONS 1 & 2: 0
2. FEELING DOWN, DEPRESSED OR HOPELESS: 0
1. LITTLE INTEREST OR PLEASURE IN DOING THINGS: 0
SUM OF ALL RESPONSES TO PHQ QUESTIONS 1-9: 0
SUM OF ALL RESPONSES TO PHQ QUESTIONS 1-9: 0

## 2021-08-25 NOTE — PROGRESS NOTES
Patient: Esperanza Soto is a 73 y.o. male who presents today with the following Chief Complaint(s):  Chief Complaint   Patient presents with    New Patient     establish care         HPI- this is a 29-year-old male patient establishing care with me today  He has a history of type 2 diabetes when asked if he checks his blood sugar he joked about this. His wife is a nurse and he said she is responsible for his care. He currently is taking glipizide 10mg twice daily and I seen a 25 mg tablet daily. He denies no side effects from the medication and is doing well. He does see a podiatrist he states twice yearly. He states that he had a [place] doctor but does not like the [place] system so he will be transferring his care here today. He also has a history of hypertension in which he is a history of CAD with CABG a year ago. He does see cardiologist for this and they manage his medications  He also has hyperlipidemia and he takes Pittore 40 mg tablet daily. He does complain of muscle cramps bilaterally to the lower legs. And he does admit to stopping all of his meds at times stating that this goes away. He was instructed to follow-up with cardiology regarding this          Current Outpatient Medications   Medication Sig Dispense Refill    potassium chloride (KLOR-CON M) 20 MEQ extended release tablet Take 1 tablet by mouth daily 60 tablet 3    nitroGLYCERIN (NITROSTAT) 0.4 MG SL tablet up to max of 3 total doses.  If no relief after 1 dose, call 911. 25 tablet 3    aspirin 325 MG tablet Take 325 mg by mouth daily      losartan (COZAAR) 100 MG tablet Take 100 mg by mouth daily      furosemide (LASIX) 40 MG tablet Take 1 tablet by mouth 2 times daily 90 tablet 3    carvedilol (COREG) 6.25 MG tablet Take 1 tablet by mouth 2 times daily 60 tablet 3    spironolactone (ALDACTONE) 25 MG tablet Take 1 tablet by mouth daily 30 tablet 3    glipiZIDE (GLUCOTROL) 10 MG tablet Take 10 mg by mouth 2 times daily (before meals) Take 2 tablets daily      atorvastatin (LIPITOR) 40 MG tablet Take 40 mg by mouth daily      alogliptin (NESINA) 25 MG TABS tablet Take 25 mg by mouth daily      metFORMIN (GLUCOPHAGE) 500 MG tablet Take 1,000 mg by mouth 2 times daily (with meals)        No current facility-administered medications for this visit. Patient's past medical history, surgical history, family history, medications,  and allergies  were all reviewed and updated as appropriate today. Review of Systems   All other systems reviewed and are negative. Physical Exam  Vitals and nursing note reviewed. Constitutional:       Appearance: Normal appearance. He is well-developed. HENT:      Head: Normocephalic. Right Ear: External ear normal.      Left Ear: External ear normal.      Nose: Nose normal.      Mouth/Throat:      Mouth: Mucous membranes are moist.      Pharynx: Oropharynx is clear. Comments: Patient with poor dentition noted to lower jaw multiple missing teeth upper jaw with no teeth. Patient states that he has upper dentures but does not wear them. Eyes:      Conjunctiva/sclera: Conjunctivae normal.      Pupils: Pupils are equal, round, and reactive to light. Cardiovascular:      Rate and Rhythm: Normal rate and regular rhythm. Pulses: Normal pulses. Heart sounds: Normal heart sounds. No murmur heard. Pulmonary:      Effort: Pulmonary effort is normal.      Breath sounds: Normal breath sounds. No wheezing or rhonchi. Abdominal:      General: Bowel sounds are normal.      Palpations: Abdomen is soft. There is no mass. Tenderness: There is no abdominal tenderness. Musculoskeletal:         General: Normal range of motion. Cervical back: Normal range of motion. No tenderness. Feet:      Right foot:      Protective Sensation: 10 sites tested. 10 sites sensed. Skin integrity: Callus and dry skin present. Toenail Condition: Right toenails are abnormally thick.       Left foot:      Protective Sensation: 10 sites tested. 10 sites sensed. Skin integrity: Callus and dry skin present. Toenail Condition: Left toenails are abnormally thick. Skin:     General: Skin is warm and dry. Comments: Skin dry and flaky to bilateral upper extremities. Patient noted to be itching there was some open scratches to bilateral upper extremities. Neurological:      General: No focal deficit present. Mental Status: He is alert and oriented to person, place, and time. Psychiatric:         Mood and Affect: Mood normal.         Behavior: Behavior normal.         Thought Content: Thought content normal.         Judgment: Judgment normal.       Vitals:    08/25/21 1413   BP: 130/88   Pulse:    SpO2:        Assessment:  Encounter Diagnoses   Name Primary?  Type 2 diabetes mellitus with other circulatory complication, without long-term current use of insulin ([facility] [address] Yes    Encounter to establish care     Benign essential hypertension     Hyperlipidemia, unspecified hyperlipidemia type     Screening for AAA (abdominal aortic aneurysm)     Screening for colon cancer     Encounter for hepatitis C screening test for low risk patient        Controlled SubstancesMonitoring:  NA    Plan:  1. Type 2 diabetes mellitus with other circulatory complication, without long-term current use of insulin (HCC)  We will  - Hemoglobin A1C; Future  -  DIABETES FOOT EXAM  - Microalbumin / Creatinine Urine Ratio  Continue glipizide 10 mg tablet twice daily and Nisina 25 mg tablet daily    2. Encounter to establish care  Establish care    3. Benign essential hypertension  Stable blood pressure today 130/88  - CBC; Future  - Comprehensive Metabolic Panel; Future  Follow-up with cardiology continue Cozaar 100 mg tablet daily, Lasix 40 mg tablet twice daily, Coreg 6.25  Twice daily, spironolactone 25 mg tablet daily    4.  Hyperlipidemia, unspecified hyperlipidemia type  Stable  - Lipid Panel; Future  Follow with cardiology  Continue Lipitor 40 mg tablet daily    5. Screening for AAA (abdominal aortic aneurysm)  - US SCREENING FOR AAA; Future    6. Screening for colon cancer  - Ismael Brenner MD, Gastroenterology, [place]    7. Encounter for hepatitis C screening test for low risk patient  - Hepatitis C Antibody; Future      Sebastián Portillo, APRN - CNP, FNP    Reviewed treatment plan with patient. Patient verbalized understanding to treatment plan and questions were answered. [address] [facility] [address]  [place], [address] [place]

## 2021-08-26 ENCOUNTER — PATIENT MESSAGE (OUTPATIENT)
Dept: FAMILY MEDICINE CLINIC | Age: 66
End: 2021-08-26

## 2021-08-26 RX ORDER — ALOGLIPTIN 25 MG/1
25 TABLET, FILM COATED ORAL DAILY
Qty: 90 TABLET | Refills: 1 | Status: SHIPPED | OUTPATIENT
Start: 2021-08-26 | End: 2021-09-01 | Stop reason: ALTCHOICE

## 2021-08-26 RX ORDER — SPIRONOLACTONE 25 MG/1
25 TABLET ORAL DAILY
Qty: 90 TABLET | Refills: 1 | Status: SHIPPED | OUTPATIENT
Start: 2021-08-26 | End: 2022-06-10

## 2021-08-26 RX ORDER — ATORVASTATIN CALCIUM 40 MG/1
40 TABLET, FILM COATED ORAL DAILY
Qty: 90 TABLET | Refills: 1 | Status: SHIPPED | OUTPATIENT
Start: 2021-08-26 | End: 2022-06-10

## 2021-08-26 RX ORDER — GLIPIZIDE 10 MG/1
10 TABLET ORAL
Qty: 180 TABLET | Refills: 1 | Status: SHIPPED | OUTPATIENT
Start: 2021-08-26 | End: 2022-06-10

## 2021-08-26 RX ORDER — CARVEDILOL 6.25 MG/1
6.25 TABLET ORAL 2 TIMES DAILY
Qty: 180 TABLET | Refills: 1 | Status: SHIPPED | OUTPATIENT
Start: 2021-08-26 | End: 2022-06-10

## 2021-08-26 RX ORDER — FUROSEMIDE 40 MG/1
40 TABLET ORAL 2 TIMES DAILY
Qty: 180 TABLET | Refills: 1 | Status: SHIPPED | OUTPATIENT
Start: 2021-08-26 | End: 2022-06-10

## 2021-08-26 RX ORDER — POTASSIUM CHLORIDE 20 MEQ/1
20 TABLET, EXTENDED RELEASE ORAL DAILY
Qty: 180 TABLET | Refills: 1 | Status: SHIPPED | OUTPATIENT
Start: 2021-08-26 | End: 2022-11-04

## 2021-08-26 NOTE — TELEPHONE ENCOUNTER
From: Nalini Claudio  To: [employer] WilmaeloisaROXANE - CNP  Sent: 8/26/2021 12:11 PM EDT  Subject: Prescription Question    Hi; This is Artemio Pennington ([address] Wife) I reminded him before his appointment yesterday, that he needed refills for his meds but see by his visit notes that none were ordered. Is it possible for you to order the meds that he gets through the [place] since he is not returning to the [place] clinic? I just filled his mediplanner for the next month and did not have enough meds to complete the month. Well I appreciate your time. If you could order the refills and send 90 day supplies to [facility] mail order that would be wonderful and would be appreciated. If you have any questions, I can be reached at [phone]. Thanks so much!   Artemio Pennington

## 2021-09-01 DIAGNOSIS — E11.9 CONTROLLED TYPE 2 DIABETES MELLITUS WITHOUT COMPLICATION, WITHOUT LONG-TERM CURRENT USE OF INSULIN (HCC): Primary | ICD-10-CM

## 2021-10-05 ENCOUNTER — APPOINTMENT (OUTPATIENT)
Dept: GENERAL RADIOLOGY | Age: 66
DRG: 287 | End: 2021-10-05
Payer: OTHER GOVERNMENT

## 2021-10-05 ENCOUNTER — HOSPITAL ENCOUNTER (INPATIENT)
Age: 66
LOS: 1 days | Discharge: HOME | DRG: 287 | End: 2021-10-07
Attending: INTERNAL MEDICINE | Admitting: INTERNAL MEDICINE
Payer: OTHER GOVERNMENT

## 2021-10-05 DIAGNOSIS — R73.9 HYPERGLYCEMIA: ICD-10-CM

## 2021-10-05 DIAGNOSIS — I20.0 UNSTABLE ANGINA PECTORIS (HCC): Primary | ICD-10-CM

## 2021-10-05 LAB
ALBUMIN SERPL-MCNC: 3.6 GM/DL (ref 3.4–5)
ALP BLD-CCNC: 139 IU/L (ref 40–129)
ALT SERPL-CCNC: 20 U/L (ref 10–40)
ANION GAP SERPL CALCULATED.3IONS-SCNC: 11 MMOL/L (ref 4–16)
AST SERPL-CCNC: 13 IU/L (ref 15–37)
BASOPHILS ABSOLUTE: 0 K/CU MM
BASOPHILS RELATIVE PERCENT: 0.3 % (ref 0–1)
BILIRUB SERPL-MCNC: 0.4 MG/DL (ref 0–1)
BUN BLDV-MCNC: 13 MG/DL (ref 6–23)
CALCIUM SERPL-MCNC: 8.2 MG/DL (ref 8.3–10.6)
CHLORIDE BLD-SCNC: 98 MMOL/L (ref 99–110)
CO2: 21 MMOL/L (ref 21–32)
CREAT SERPL-MCNC: 0.8 MG/DL (ref 0.9–1.3)
D DIMER: <200 NG/ML(DDU)
DIFFERENTIAL TYPE: ABNORMAL
EOSINOPHILS ABSOLUTE: 0.2 K/CU MM
EOSINOPHILS RELATIVE PERCENT: 2.7 % (ref 0–3)
GFR AFRICAN AMERICAN: >60 ML/MIN/1.73M2
GFR NON-AFRICAN AMERICAN: >60 ML/MIN/1.73M2
GLUCOSE BLD-MCNC: 208 MG/DL (ref 70–99)
GLUCOSE BLD-MCNC: 251 MG/DL (ref 70–99)
GLUCOSE BLD-MCNC: 482 MG/DL (ref 70–99)
GLUCOSE BLD-MCNC: 592 MG/DL (ref 70–99)
HCT VFR BLD CALC: 45.5 % (ref 42–52)
HEMOGLOBIN: 16 GM/DL (ref 13.5–18)
IMMATURE NEUTROPHIL %: 0.2 % (ref 0–0.43)
LIPASE: 34 IU/L (ref 13–60)
LYMPHOCYTES ABSOLUTE: 1.9 K/CU MM
LYMPHOCYTES RELATIVE PERCENT: 30.2 % (ref 24–44)
MAGNESIUM: 1.8 MG/DL (ref 1.8–2.4)
MCH RBC QN AUTO: 32 PG (ref 27–31)
MCHC RBC AUTO-ENTMCNC: 35.2 % (ref 32–36)
MCV RBC AUTO: 91 FL (ref 78–100)
MONOCYTES ABSOLUTE: 0.6 K/CU MM
MONOCYTES RELATIVE PERCENT: 8.7 % (ref 0–4)
NUCLEATED RBC %: 0 %
PDW BLD-RTO: 11.9 % (ref 11.7–14.9)
PLATELET # BLD: 252 K/CU MM (ref 140–440)
PMV BLD AUTO: 10.1 FL (ref 7.5–11.1)
POTASSIUM SERPL-SCNC: 4.2 MMOL/L (ref 3.5–5.1)
PRO-BNP: 64.73 PG/ML
RBC # BLD: 5 M/CU MM (ref 4.6–6.2)
SEGMENTED NEUTROPHILS ABSOLUTE COUNT: 3.7 K/CU MM
SEGMENTED NEUTROPHILS RELATIVE PERCENT: 57.9 % (ref 36–66)
SODIUM BLD-SCNC: 130 MMOL/L (ref 135–145)
TOTAL IMMATURE NEUTOROPHIL: 0.01 K/CU MM
TOTAL NUCLEATED RBC: 0 K/CU MM
TOTAL PROTEIN: 6.2 GM/DL (ref 6.4–8.2)
TROPONIN T: 0.04 NG/ML
TROPONIN T: <0.01 NG/ML
WBC # BLD: 6.4 K/CU MM (ref 4–10.5)

## 2021-10-05 PROCEDURE — 99223 1ST HOSP IP/OBS HIGH 75: CPT | Performed by: INTERNAL MEDICINE

## 2021-10-05 PROCEDURE — 71045 X-RAY EXAM CHEST 1 VIEW: CPT

## 2021-10-05 PROCEDURE — 85025 COMPLETE CBC W/AUTO DIFF WBC: CPT

## 2021-10-05 PROCEDURE — 99285 EMERGENCY DEPT VISIT HI MDM: CPT

## 2021-10-05 PROCEDURE — 6370000000 HC RX 637 (ALT 250 FOR IP): Performed by: INTERNAL MEDICINE

## 2021-10-05 PROCEDURE — 80053 COMPREHEN METABOLIC PANEL: CPT

## 2021-10-05 PROCEDURE — G0378 HOSPITAL OBSERVATION PER HR: HCPCS

## 2021-10-05 PROCEDURE — 83735 ASSAY OF MAGNESIUM: CPT

## 2021-10-05 PROCEDURE — 83880 ASSAY OF NATRIURETIC PEPTIDE: CPT

## 2021-10-05 PROCEDURE — 82962 GLUCOSE BLOOD TEST: CPT

## 2021-10-05 PROCEDURE — 85379 FIBRIN DEGRADATION QUANT: CPT

## 2021-10-05 PROCEDURE — 36415 COLL VENOUS BLD VENIPUNCTURE: CPT

## 2021-10-05 PROCEDURE — 6370000000 HC RX 637 (ALT 250 FOR IP): Performed by: PHYSICIAN ASSISTANT

## 2021-10-05 PROCEDURE — 93005 ELECTROCARDIOGRAM TRACING: CPT | Performed by: INTERNAL MEDICINE

## 2021-10-05 PROCEDURE — 83690 ASSAY OF LIPASE: CPT

## 2021-10-05 PROCEDURE — 2580000003 HC RX 258: Performed by: PHYSICIAN ASSISTANT

## 2021-10-05 PROCEDURE — 84484 ASSAY OF TROPONIN QUANT: CPT

## 2021-10-05 RX ORDER — FUROSEMIDE 20 MG/1
40 TABLET ORAL 2 TIMES DAILY
Status: DISCONTINUED | OUTPATIENT
Start: 2021-10-05 | End: 2021-10-07 | Stop reason: HOSPADM

## 2021-10-05 RX ORDER — DEXTROSE MONOHYDRATE 25 G/50ML
12.5 INJECTION, SOLUTION INTRAVENOUS PRN
Status: DISCONTINUED | OUTPATIENT
Start: 2021-10-05 | End: 2021-10-07 | Stop reason: HOSPADM

## 2021-10-05 RX ORDER — DEXTROSE MONOHYDRATE 50 MG/ML
100 INJECTION, SOLUTION INTRAVENOUS PRN
Status: DISCONTINUED | OUTPATIENT
Start: 2021-10-05 | End: 2021-10-07 | Stop reason: HOSPADM

## 2021-10-05 RX ORDER — ATORVASTATIN CALCIUM 40 MG/1
40 TABLET, FILM COATED ORAL NIGHTLY
Status: DISCONTINUED | OUTPATIENT
Start: 2021-10-05 | End: 2021-10-05

## 2021-10-05 RX ORDER — NICOTINE POLACRILEX 4 MG
15 LOZENGE BUCCAL PRN
Status: DISCONTINUED | OUTPATIENT
Start: 2021-10-05 | End: 2021-10-07 | Stop reason: HOSPADM

## 2021-10-05 RX ORDER — 0.9 % SODIUM CHLORIDE 0.9 %
250 INTRAVENOUS SOLUTION INTRAVENOUS ONCE
Status: COMPLETED | OUTPATIENT
Start: 2021-10-05 | End: 2021-10-05

## 2021-10-05 RX ORDER — CARVEDILOL 6.25 MG/1
6.25 TABLET ORAL 2 TIMES DAILY
Status: DISCONTINUED | OUTPATIENT
Start: 2021-10-05 | End: 2021-10-07 | Stop reason: HOSPADM

## 2021-10-05 RX ORDER — ONDANSETRON 2 MG/ML
4 INJECTION INTRAMUSCULAR; INTRAVENOUS EVERY 6 HOURS PRN
Status: DISCONTINUED | OUTPATIENT
Start: 2021-10-05 | End: 2021-10-07 | Stop reason: HOSPADM

## 2021-10-05 RX ORDER — ASPIRIN 325 MG
325 TABLET ORAL ONCE
Status: COMPLETED | OUTPATIENT
Start: 2021-10-05 | End: 2021-10-05

## 2021-10-05 RX ORDER — ATORVASTATIN CALCIUM 40 MG/1
40 TABLET, FILM COATED ORAL NIGHTLY
Status: DISCONTINUED | OUTPATIENT
Start: 2021-10-06 | End: 2021-10-05

## 2021-10-05 RX ORDER — ACETAMINOPHEN 325 MG/1
650 TABLET ORAL EVERY 6 HOURS PRN
Status: DISCONTINUED | OUTPATIENT
Start: 2021-10-05 | End: 2021-10-07 | Stop reason: HOSPADM

## 2021-10-05 RX ORDER — ATORVASTATIN CALCIUM 40 MG/1
40 TABLET, FILM COATED ORAL DAILY
Status: DISCONTINUED | OUTPATIENT
Start: 2021-10-06 | End: 2021-10-07 | Stop reason: HOSPADM

## 2021-10-05 RX ORDER — ASPIRIN 325 MG
325 TABLET ORAL DAILY
Status: DISCONTINUED | OUTPATIENT
Start: 2021-10-06 | End: 2021-10-07 | Stop reason: HOSPADM

## 2021-10-05 RX ORDER — ACETAMINOPHEN 650 MG/1
650 SUPPOSITORY RECTAL EVERY 6 HOURS PRN
Status: DISCONTINUED | OUTPATIENT
Start: 2021-10-05 | End: 2021-10-07 | Stop reason: HOSPADM

## 2021-10-05 RX ORDER — SODIUM CHLORIDE 0.9 % (FLUSH) 0.9 %
5-40 SYRINGE (ML) INJECTION PRN
Status: DISCONTINUED | OUTPATIENT
Start: 2021-10-05 | End: 2021-10-07 | Stop reason: HOSPADM

## 2021-10-05 RX ORDER — POLYETHYLENE GLYCOL 3350 17 G/17G
17 POWDER, FOR SOLUTION ORAL DAILY PRN
Status: DISCONTINUED | OUTPATIENT
Start: 2021-10-05 | End: 2021-10-07 | Stop reason: HOSPADM

## 2021-10-05 RX ORDER — SODIUM CHLORIDE 9 MG/ML
25 INJECTION, SOLUTION INTRAVENOUS PRN
Status: DISCONTINUED | OUTPATIENT
Start: 2021-10-05 | End: 2021-10-07 | Stop reason: HOSPADM

## 2021-10-05 RX ORDER — LOSARTAN POTASSIUM 50 MG/1
100 TABLET ORAL DAILY
Status: DISCONTINUED | OUTPATIENT
Start: 2021-10-06 | End: 2021-10-07 | Stop reason: HOSPADM

## 2021-10-05 RX ORDER — INSULIN GLARGINE 100 [IU]/ML
10 INJECTION, SOLUTION SUBCUTANEOUS NIGHTLY
Status: DISCONTINUED | OUTPATIENT
Start: 2021-10-05 | End: 2021-10-07 | Stop reason: HOSPADM

## 2021-10-05 RX ORDER — ONDANSETRON 4 MG/1
4 TABLET, ORALLY DISINTEGRATING ORAL EVERY 8 HOURS PRN
Status: DISCONTINUED | OUTPATIENT
Start: 2021-10-05 | End: 2021-10-07 | Stop reason: HOSPADM

## 2021-10-05 RX ORDER — ASPIRIN 81 MG/1
81 TABLET, CHEWABLE ORAL DAILY
Status: DISCONTINUED | OUTPATIENT
Start: 2021-10-06 | End: 2021-10-05

## 2021-10-05 RX ORDER — SODIUM CHLORIDE 0.9 % (FLUSH) 0.9 %
5-40 SYRINGE (ML) INJECTION EVERY 12 HOURS SCHEDULED
Status: DISCONTINUED | OUTPATIENT
Start: 2021-10-05 | End: 2021-10-07 | Stop reason: HOSPADM

## 2021-10-05 RX ORDER — 0.9 % SODIUM CHLORIDE 0.9 %
500 INTRAVENOUS SOLUTION INTRAVENOUS ONCE
Status: DISCONTINUED | OUTPATIENT
Start: 2021-10-05 | End: 2021-10-05

## 2021-10-05 RX ORDER — SPIRONOLACTONE 25 MG/1
25 TABLET ORAL DAILY
Status: DISCONTINUED | OUTPATIENT
Start: 2021-10-06 | End: 2021-10-07 | Stop reason: HOSPADM

## 2021-10-05 RX ADMIN — SODIUM CHLORIDE 250 ML: 9 INJECTION, SOLUTION INTRAVENOUS at 15:49

## 2021-10-05 RX ADMIN — CARVEDILOL 6.25 MG: 6.25 TABLET, FILM COATED ORAL at 23:09

## 2021-10-05 RX ADMIN — ASPIRIN 162.5 MG: 325 TABLET ORAL at 15:46

## 2021-10-05 RX ADMIN — INSULIN LISPRO 6 UNITS: 100 INJECTION, SOLUTION INTRAVENOUS; SUBCUTANEOUS at 20:09

## 2021-10-05 ASSESSMENT — PAIN SCALES - GENERAL
PAINLEVEL_OUTOF10: 2
PAINLEVEL_OUTOF10: 0

## 2021-10-05 ASSESSMENT — PAIN DESCRIPTION - LOCATION: LOCATION: CHEST

## 2021-10-05 ASSESSMENT — HEART SCORE: ECG: 0

## 2021-10-05 NOTE — ED PROVIDER NOTES
EMERGENCY DEPARTMENT ENCOUNTER        PCP: Valarie Lopez, [facility]    Chief Complaint   Patient presents with    Chest Pain     started while eating breakfast today, nitro x 3 no improvement         Of note, this patient was not evaluated by attending physician, attending this available consultation. HPI      Zachery Easton is a 73 y.o. male who presents to the emergency department today with a chief complaint of chest pain. Patient states that started this morning after eating breakfast, located in central chest.  States he describes as a pressure/intense pain. He states that he then took 3 nitro which did not seem to alleviate his pain. They came to the emergency department, he states upon arriving his pain then dissipated. He states that he \"went to leave\" and then started having even more pain into the chest area. At that point decided to be evaluated. Patient has significant history of coronary artery disease, he has hyperlipidemia, hypertension, is a diabetic. Has follow-up with Dr. Amber Del Castillo, has history of bypass graft. Denies abdominal pain. No nausea or vomiting. No fevers or chills. No chest pain no cough. No recent sick contacts. REVIEW OF SYSTEMS    Constitutional:  Denies fever, chills. HENT:  Denies sore throat or ear pain   Cardiovascular:  +Chest Pain,  Denies palpitations,  Denies syncope, no extremity edema. Respiratory:    Denies cough, shortness of breath, or hemoptysis    GI:  Denies abdominal pain, nausea, vomiting, or diarrhea  :  Denies any urinary symptoms  Musculoskeletal:  Denies back pain, no extremity pain, swelling or discoloration.   No pale or cold extremity  Skin:  Denies rash  Neurologic:  Denies changes in vision,  lightheadedness, dizziness, headache, focal weakness or sensory changes   Endocrine:  Denies polyuria or polydypsia   Lymphatic:  Denies swollen glands     All other review of systems are negative  See HPI and nursing notes for additional information         PAST MEDICAL & SURGICAL HISTORY    Past Medical History:   Diagnosis Date    CAD (coronary artery disease)     Diabetes mellitus ([facility] [address]     FHx: coronary artery disease     History of echocardiogram 08/05/2020    EF 45-50%, Limited due to body habitus and sore chest, Mild left ventricular hypertrophy, Grade I Diastolic Dysfunction, Bi atrial enlargement, No significant valvular disease , no pericardial effusion    History of exercise stress test 06/04/2020    Treadmill, Near maximal study negative for angina or ECG evidence of ischemia. Appropriate hemodynamic response to exercise is noted.     Hyperlipidemia     Hypertension     S/P CABG x 4 04/09/2020     Past Surgical History:   Procedure Laterality Date    ANUS SURGERY      fisure    CHOLECYSTECTOMY      COLONOSCOPY  2008    COLONOSCOPY  11/29/2016    polyps x9, int hem    CORONARY ARTERY BYPASS GRAFT  04/09/2020    x 4 OM, PDA, LAD, DIAG    CORONARY ARTERY BYPASS GRAFT N/A 4/9/2020    CABG CORONARY ARTERY BYPASS x4 WITH LIMA, INTRAOP DEVEN, ENDOHARVEST OF THE LEFT SAPHENOUS VEIN performed by Amado Lombard, MD at [facility], COLON, DIAGNOSTIC  11/29/2016    mild reflux esophagitis, non obstructive esophageal ring, hiatal hernia, mild gastritis    FRACTURE SURGERY      left arm    HERNIA REPAIR         CURRENT MEDICATIONS    Current Outpatient Rx   Medication Sig Dispense Refill    SITagliptin (JANUVIA) 50 MG tablet Take 1 tablet by mouth daily 30 tablet 1    potassium chloride (KLOR-CON M) 20 MEQ extended release tablet Take 1 tablet by mouth daily 180 tablet 1    furosemide (LASIX) 40 MG tablet Take 1 tablet by mouth 2 times daily 180 tablet 1    carvedilol (COREG) 6.25 MG tablet Take 1 tablet by mouth 2 times daily 180 tablet 1    spironolactone (ALDACTONE) 25 MG tablet Take 1 tablet by mouth daily 90 tablet 1    glipiZIDE (GLUCOTROL) 10 MG tablet Take 1 tablet by mouth 2 times daily (before meals) Take 10 mg by mouth 2 times daily (before meals) 180 tablet 1    atorvastatin (LIPITOR) 40 MG tablet Take 1 tablet by mouth daily 90 tablet 1    metFORMIN (GLUCOPHAGE) 500 MG tablet Take 2 tablets by mouth 2 times daily (with meals) 360 tablet 1    nitroGLYCERIN (NITROSTAT) 0.4 MG SL tablet up to max of 3 total doses. If no relief after 1 dose, call 911. 25 tablet 3    aspirin 325 MG tablet Take 325 mg by mouth daily      losartan (COZAAR) 100 MG tablet Take 100 mg by mouth daily         ALLERGIES    Allergies   Allergen Reactions    Lisinopril      Other reaction(s): Cough    Niacin      Other reaction(s): Flushing    Vicodin [Hydrocodone-Acetaminophen] Other (See Comments)     Dislikes the feeling it gives him    Percocet [Oxycodone-Acetaminophen] Nausea And Vomiting       SOCIAL & FAMILY HISTORY    Social History     Socioeconomic History    Marital status:      Spouse name: None    Number of children: None    Years of education: None    Highest education level: None   Occupational History    None   Tobacco Use    Smoking status: Former Smoker     Packs/day: 0.50     Types: Cigarettes     Start date: 1974     Quit date: 1994     Years since quittin.3    Smokeless tobacco: Never Used   Vaping Use    Vaping Use: Never used   Substance and Sexual Activity    Alcohol use: No     Comment: caffeine 6-7 20oz pops a day    Drug use: No    Sexual activity: None   Other Topics Concern    None   Social History Narrative    None     Social Determinants of Health     Financial Resource Strain:     Difficulty of Paying Living Expenses:    Food Insecurity:     Worried About Running Out of Food in the Last Year:     Ran Out of Food in the Last Year:    Transportation Needs:     Lack of Transportation (Medical):      Lack of Transportation (Non-Medical):    Physical Activity:     Days of Exercise per Week:     Minutes of Exercise per Session:    Stress:     Feeling of Stress : Social Connections:     Frequency of Communication with Friends and Family:     Frequency of Social Gatherings with Friends and Family:     Attends Rastafarian Services:     Active Member of Clubs or Organizations:     Attends Club or Organization Meetings:     Marital Status:    Intimate Partner Violence:     Fear of Current or Ex-Partner:     Emotionally Abused:     Physically Abused:     Sexually Abused:      Family History   Problem Relation Age of Onset    Diabetes Mother     Heart Disease Mother     Heart Disease Father     Cancer Father     Cancer Brother        PHYSICAL EXAM    VITAL SIGNS: BP (!) 167/102   Pulse 73   Temp 97.7 °F (36.5 °C) (Oral)   Resp 18   Ht 5' 10\" (1.778 m)   Wt 238 lb (108 kg)   SpO2 95%   BMI 34.15 kg/m²    Constitutional:  Well developed, well nourished, no acute distress   HENT:  Atraumatic, moist mucus membranes  Neck/Lymphatics: supple, no JVD, no swollen nodes  Respiratory:  Nonlabored breathing. Lungs Clear, no retractions. Cardiovascular:  RRR, no murmurs/rubs/gallops. No carotid bruits or murmurs heard in carotids. No JVD  Vascular:   Radial and femoral pulses palpable and reveal no discernible inequality  GI:  Soft, nontender, normal bowel sounds  Musculoskeletal:    There is no edema, asymmetry, or calf / thigh tenderness bilaterally. No cyanosis. No cool or pale-appearing limb.   Distal cap refill and pulses intact bilateral upper and lower extremities  Bilateral upper and lower extremity ROM intact without pain or obvious deficit  Integument:  Skin warm and dry, no petechiae   Neurologic:  Alert & oriented, normal speech    - Alert & oriented person, place, time, and situation, no speech difficulties or slurring.  - No obvious gross motor deficits  - Cranial nerves 2-12 grossly intact  - Negative meningeal signs.  - Sensation intact to light touch  - Strength 5/5 in upper and lower extremities bilaterally  - No truncal ataxia  Psych: Pleasant affect, no hallucinations    LABS:  Results for orders placed or performed during the hospital encounter of 10/05/21   CBC auto diff   Result Value Ref Range    WBC 6.4 4.0 - 10.5 K/CU MM    RBC 5.00 4.6 - 6.2 M/CU MM    Hemoglobin 16.0 13.5 - 18.0 GM/DL    Hematocrit 45.5 42 - 52 %    MCV 91.0 78 - 100 FL    MCH 32.0 (H) 27 - 31 PG    MCHC 35.2 32.0 - 36.0 %    RDW 11.9 11.7 - 14.9 %    Platelets 872 057 - 520 K/CU MM    MPV 10.1 7.5 - 11.1 FL    Differential Type AUTOMATED DIFFERENTIAL     Segs Relative 57.9 36 - 66 %    Lymphocytes % 30.2 24 - 44 %    Monocytes % 8.7 (H) 0 - 4 %    Eosinophils % 2.7 0 - 3 %    Basophils % 0.3 0 - 1 %    Segs Absolute 3.7 K/CU MM    Lymphocytes Absolute 1.9 K/CU MM    Monocytes Absolute 0.6 K/CU MM    Eosinophils Absolute 0.2 K/CU MM    Basophils Absolute 0.0 K/CU MM    Nucleated RBC % 0.0 %    Total Nucleated RBC 0.0 K/CU MM    Total Immature Neutrophil 0.01 K/CU MM    Immature Neutrophil % 0.2 0 - 0.43 %   CMP   Result Value Ref Range    Sodium 130 (L) 135 - 145 MMOL/L    Potassium 4.2 3.5 - 5.1 MMOL/L    Chloride 98 (L) 99 - 110 mMol/L    CO2 21 21 - 32 MMOL/L    BUN 13 6 - 23 MG/DL    CREATININE 0.8 (L) 0.9 - 1.3 MG/DL    Glucose 592 (HH) 70 - 99 MG/DL    Calcium 8.2 (L) 8.3 - 10.6 MG/DL    Albumin 3.6 3.4 - 5.0 GM/DL    Total Protein 6.2 (L) 6.4 - 8.2 GM/DL    Total Bilirubin 0.4 0.0 - 1.0 MG/DL    ALT 20 10 - 40 U/L    AST 13 (L) 15 - 37 IU/L    Alkaline Phosphatase 139 (H) 40 - 129 IU/L    GFR Non-African American >60 >60 mL/min/1.73m2    GFR African American >60 >60 mL/min/1.73m2    Anion Gap 11 4 - 16   Troponin   Result Value Ref Range    Troponin T <0.010 <0.01 NG/ML   Lipase   Result Value Ref Range    Lipase 34 13 - 60 IU/L   Magnesium   Result Value Ref Range    Magnesium 1.8 1.8 - 2.4 mg/dl   Brain Natriuretic Peptide   Result Value Ref Range    Pro-BNP 64.73 <300 PG/ML   D-dimer, Quantitative   Result Value Ref Range    D-Dimer, Quant <200 <230 NG/mL(DDU) EKG 12 Lead   Result Value Ref Range    Ventricular Rate 70 BPM    Atrial Rate 70 BPM    P-R Interval 156 ms    QRS Duration 110 ms    Q-T Interval 424 ms    QTc Calculation (Bazett) 457 ms    P Axis 59 degrees    R Axis 11 degrees    T Axis 74 degrees    Diagnosis       Normal sinus rhythm  Low voltage QRS  Possible Inferior infarct (cited on or before 05-APR-2020)  Cannot rule out Anterior infarct , age undetermined  Abnormal ECG  When compared with ECG of 25-JUL-2021 06:10,  fusion complexes are no longer present  Minimal criteria for Anterior infarct are now present  QT has shortened       EKG Interpretation  Please see ED physician's note for EKG interpretation    \RADIOLOGY/PROCEDURES    XR CHEST PORTABLE    Result Date: 10/5/2021  EXAMINATION: ONE XRAY VIEW OF THE CHEST 10/5/2021 1:32 pm COMPARISON: Chest radiograph July 24, 2021, August 6, 2020 HISTORY: ORDERING SYSTEM PROVIDED HISTORY: Chest pain TECHNOLOGIST PROVIDED HISTORY: Reason for exam:->Chest pain Reason for Exam: chest pain FINDINGS: The lungs are without acute focal process. There is no effusion or pneumothorax. The cardiomediastinal silhouette is without acute process. The osseous structures are without acute process. No acute pulmonary process.      ----------------------------------------------------------------------------------------------------------------------      Pt's Wells score is <2 and therefore a PERC score was calculated (see below    ----------------------------------------------------------------------------------------------------------------------  ----------------------------------------------------------------------------------------------------------------------          Pt is positive for one or more of the above criteria and therefore a D-dimer was obtained and is NOT elevated, and therefore pt is low risk for having a PE and does not require further testing      ----------------------------------------------------------------------------------------------------------------------        ED COURSE & MEDICAL DECISION MAKING    Exact etiology of patient's symptoms unknown at this time. Patient was hyperglycemic, will give subcutaneous insulin. Patient did not have any other significant pain. Was given aspirin in the ED. Looking to admit for further ACS rule out/ cardiology evaluation. Patient also will need glycemic control while in the hospital    I considered but do not suspect Aortic discection / aneurysm. Pt does not report sudden onset of tearing pain, pain does not migrate, pt denies concurrent neuro symptoms (& pt neurologically intact on exam today), and I do not appreciate inequality of pulses on exam today. Additionally, CXR is without abnormality suggestive of TAD and  D-dimer is not elevated    I also considered pulmonary embolism as the cause of symptoms today. Using risk stratification tools today (see note above for details), Pt was found low risk for PE.    ------------------------------  History: 1  EC  Patient Age: 3  *Risk factors for Atherosclerotic disease: Diabetes Mellitus; Hypercholesterolemia; Hypertension;Coronary Artery Disease  Risk Factors: 2  Troponin: 0  Heart Score Total: 5     Pt's HEART score today is 5. Pt understands that this HEART score, based on the HEART score study, represents a 12% to 16.6% risk of adverse cardiac event. It is therefore my recommendation that Pt be admitted to the hospital for further monitoring & evaluation.    -----------------------------     - I discussed patient case with Dr. Cedric Santoyo who is recommending an echocardiogram as well as a stress test in the a.m._    Clinical  IMPRESSION    1. Unstable angina pectoris ([address]    2.  Hyperglycemia       Admission to the hospital    Comment: Please note this report has been produced using speech recognition software and may contain errors related to that system including errors in grammar, punctuation, and spelling, as well as words and phrases that may be inappropriate. If there are any questions or concerns please feel free to contact the dictating provider for clarification.       Sarah Kwon, PA  10/05/21 1940

## 2021-10-05 NOTE — H&P
HISTORY AND PHYSICAL  (Hospitalist, Internal Medicine)  IDENTIFYING INFORMATION   PATIENT:  Ze Regalado  MRN:  [id number]  ADMIT DATE: 10/5/2021      CHIEF COMPLAINT   Chest pain    HISTORY OF PRESENT ILLNESS   Ze Regalado is a 73 y.o. male with coronary artery disease s/p CABG (4/2020), hypertension, hyperlipidemia, uncontrolled diabetes, noncompliant, obesity presented to ED with complaints of chest pain. Patient reported that he started having chest pain in the morning, substernal, 4/10 intensity, denied any radiation, denied any aggravating or relieving factors. Patient denied any associated nausea, vomiting, shortness of breath, diaphoresis. Patient reported having pain for 1 hour. Took 3 nitroglycerin, which kind of relieved the pain. Patient was brought to the ED by his wife, but by the time he came to the ER his pain completely resolved and then he returned back home. After going home he had another episode of chest pain which prompted him to come to the ER again. Patient currently reports not having any chest pain. Denied any fever, chills, cough, denied any abdominal pain, no urinary complaints no constipation or diarrhea. At presentation patient was noted to have /83, HR 75, RR 16, temp 97.7, saturating 98% on room air. Lab work significant for sodium 130, potassium 4.2, creatinine 0.8, magnesium 1.8, random glucose 592, troponin<0.010, CBC within normal range. D-dimer<200. Chest x-ray-no acute pulmonary process. EKG with no acute ST-T wave changes. Patient received aspirin 325 mg, 250 cc NS bolus, regular insulin 10 units subcu in ER. Cardiology evaluated the patient in ER and planning for stress test and echo in a.m.     PAST MEDICAL HISTORY PAST SURGICAL HISTORY   coronary artery disease s/p CABG (4/2020), hypertension, hyperlipidemia, uncontrolled diabetes, noncompliant, obesity  CABG, cholecystectomy, hernia repair   FAMILY HISTORY SOCIAL HISTORY    Reviewed and noncontributory denies smoking, alcohol, illicit drug use   MEDICATIONS ALLERGIES    Patient provided a list of his medications-is on Metformin for 100 mg 2 tabs twice daily, aspirin 325 mg daily, losartan 100 mg daily, atorvastatin 40 mg daily, glipizide 10 mg daily, spironolactone 25 mg daily, Lasix 40 mg twice daily, carvedilol 6.25 mg twice daily. Lisinopril-cough, niacin, Percocet, Vicodin       PAST MEDICAL, SURGICAL, FAMILY, and SOCIAL HISTORY         Past Medical History:   Diagnosis Date    CAD (coronary artery disease)     Diabetes mellitus ([facility] [address]     FHx: coronary artery disease     History of echocardiogram 08/05/2020    EF 45-50%, Limited due to body habitus and sore chest, Mild left ventricular hypertrophy, Grade I Diastolic Dysfunction, Bi atrial enlargement, No significant valvular disease , no pericardial effusion    History of exercise stress test 06/04/2020    Treadmill, Near maximal study negative for angina or ECG evidence of ischemia. Appropriate hemodynamic response to exercise is noted.     Hyperlipidemia     Hypertension     S/P CABG x 4 04/09/2020     Past Surgical History:   Procedure Laterality Date    ANUS SURGERY      fisure    CHOLECYSTECTOMY      COLONOSCOPY  2008    COLONOSCOPY  11/29/2016    polyps x9, int hem    CORONARY ARTERY BYPASS GRAFT  04/09/2020    x 4 OM, PDA, LAD, DIAG    CORONARY ARTERY BYPASS GRAFT N/A 4/9/2020    CABG CORONARY ARTERY BYPASS x4 WITH LIMA, INTRAOP DEVEN, ENDOHARVEST OF THE LEFT SAPHENOUS VEIN performed by Zechariah Huang MD at [address], COLON, DIAGNOSTIC  11/29/2016    mild reflux esophagitis, non obstructive esophageal ring, hiatal hernia, mild gastritis    FRACTURE SURGERY      left arm    HERNIA REPAIR       Family History   Problem Relation Age of Onset    Diabetes Mother     Heart Disease Mother     Heart Disease Father     Cancer Father     Cancer Brother      Family Hx of HTN  Family Hx as reviewed above, otherwise non-contributory  Social History     Socioeconomic History    Marital status:      Spouse name: None    Number of children: None    Years of education: None    Highest education level: None   Occupational History    None   Tobacco Use    Smoking status: Former Smoker     Packs/day: 0.50     Types: Cigarettes     Start date: 1974     Quit date: 1994     Years since quittin.3    Smokeless tobacco: Never Used   Vaping Use    Vaping Use: Never used   Substance and Sexual Activity    Alcohol use: No     Comment: caffeine 6-7 20oz pops a day    Drug use: No    Sexual activity: None   Other Topics Concern    None   Social History Narrative    None     Social Determinants of Health     Financial Resource Strain:     Difficulty of Paying Living Expenses:    Food Insecurity:     Worried About Running Out of Food in the Last Year:     Ran Out of Food in the Last Year:    Transportation Needs:     Lack of Transportation (Medical):  Lack of Transportation (Non-Medical):    Physical Activity:     Days of Exercise per Week:     Minutes of Exercise per Session:    Stress:     Feeling of Stress :    Social Connections:     Frequency of Communication with Friends and Family:     Frequency of Social Gatherings with Friends and Family:     Attends Yazidism Services:     Active Member of Clubs or Organizations:     Attends Club or Organization Meetings:     Marital Status:    Intimate Partner Violence:     Fear of Current or Ex-Partner:     Emotionally Abused:     Physically Abused:     Sexually Abused:        MEDICATIONS   Medications Prior to Admission  Not in a hospital admission.     Current Medications  Current Facility-Administered Medications   Medication Dose Route Frequency Provider Last Rate Last Admin    insulin regular (HUMULIN R;NOVOLIN R) injection 10 Units  10 Units SubCUTAneous Once JULI Guerra         Current Outpatient Medications   Medication Sig Dispense Refill    SITagliptin (JANUVIA) 50 MG tablet Take 1 tablet by mouth daily 30 tablet 1    potassium chloride (KLOR-CON M) 20 MEQ extended release tablet Take 1 tablet by mouth daily 180 tablet 1    furosemide (LASIX) 40 MG tablet Take 1 tablet by mouth 2 times daily 180 tablet 1    carvedilol (COREG) 6.25 MG tablet Take 1 tablet by mouth 2 times daily 180 tablet 1    spironolactone (ALDACTONE) 25 MG tablet Take 1 tablet by mouth daily 90 tablet 1    glipiZIDE (GLUCOTROL) 10 MG tablet Take 1 tablet by mouth 2 times daily (before meals) Take 10 mg by mouth 2 times daily (before meals) 180 tablet 1    atorvastatin (LIPITOR) 40 MG tablet Take 1 tablet by mouth daily 90 tablet 1    metFORMIN (GLUCOPHAGE) 500 MG tablet Take 2 tablets by mouth 2 times daily (with meals) 360 tablet 1    nitroGLYCERIN (NITROSTAT) 0.4 MG SL tablet up to max of 3 total doses. If no relief after 1 dose, call 911. 25 tablet 3    aspirin 325 MG tablet Take 325 mg by mouth daily      losartan (COZAAR) 100 MG tablet Take 100 mg by mouth daily           Allergies  Allergies   Allergen Reactions    Lisinopril      Other reaction(s): Cough    Niacin      Other reaction(s): Flushing    Vicodin [Hydrocodone-Acetaminophen] Other (See Comments)     Dislikes the feeling it gives him    Percocet [Oxycodone-Acetaminophen] Nausea And Vomiting       REVIEW OF SYSTEMS   10 point review of systems conducted and pertinent positives and negatives as per HPI. PHYSICAL EXAM     Wt Readings from Last 3 Encounters:   10/05/21 238 lb (108 kg)   08/25/21 244 lb (110.7 kg)   07/24/21 243 lb 1 oz (110.3 kg)       Blood pressure (!) 167/102, pulse 73, temperature 97.7 °F (36.5 °C), temperature source Oral, resp. rate 18, height 5' 10\" (1.778 m), weight 238 lb (108 kg), SpO2 95 %. GEN  -Awake, alert, NAD.   EYES   -PERRL.   HENT  -MM are moist.   RESP  -LS CTA equal bilat, no wheezes, rales or rhonchi. Symmetric chest movement. No respiratory distress noted. C/V  -S1/S2 auscultated. RRR without appreciable M/R/G. No JVD or carotid bruits. Peripheral pulses equal bilaterally and palpable. No peripheral edema. No reproducible chest wall tenderness. GI  -Abdomen is soft, non-distended, no significant tenderness. No masses or guarding. + BS in all quadrants. Rectal exam deferred.   -No CVA tenderness. Jay catheter is not present. MS  -B/L extremities strong muscles strength. Full movements. No gross joint deformities. No swelling, intact sensation symmetrical.   SKIN  -Normal coloration, warm, dry. NEURO  - Awake, alert, oriented x 3, no focal deficits. PSYC  - Appropriate affect. LABS AND IMAGING     Results for Arun Bowers (MRN [id number]) as of 10/5/2021 19:56   Ref.  Range 10/5/2021 12:39   Sodium Latest Ref Range: 135 - 145 MMOL/L 130 (L)   Potassium Latest Ref Range: 3.5 - 5.1 MMOL/L 4.2   Chloride Latest Ref Range: 99 - 110 mMol/L 98 (L)   CO2 Latest Ref Range: 21 - 32 MMOL/L 21   BUN Latest Ref Range: 6 - 23 MG/DL 13   Creatinine Latest Ref Range: 0.9 - 1.3 MG/DL 0.8 (L)   Anion Gap Latest Ref Range: 4 - 16  11   GFR Non- Latest Ref Range: >60 mL/min/1.73m2 >60   GFR African American Latest Ref Range: >60 mL/min/1.73m2 >60   Glucose Latest Ref Range: 70 - 99 MG/ (HH)   Calcium Latest Ref Range: 8.3 - 10.6 MG/DL 8.2 (L)   Total Protein Latest Ref Range: 6.4 - 8.2 GM/DL 6.2 (L)   Troponin T Latest Ref Range: <0.01 NG/ML <0.010   Albumin Latest Ref Range: 3.4 - 5.0 GM/DL 3.6   Alk Phos Latest Ref Range: 40 - 129 IU/L 139 (H)   ALT Latest Ref Range: 10 - 40 U/L 20   AST Latest Ref Range: 15 - 37 IU/L 13 (L)   Bilirubin Latest Ref Range: 0.0 - 1.0 MG/DL 0.4   WBC Latest Ref Range: 4.0 - 10.5 K/CU MM 6.4   RBC Latest Ref Range: 4.6 - 6.2 M/CU MM 5.00   Hemoglobin Quant Latest Ref Range: 13.5 - 18.0 GM/DL 16.0   Hematocrit Latest Ref Range: 42 - 52 % 45.5   MCV Latest Ref Range: 78 - 100 FL 91.0   MCH Latest Ref Range: 27 - 31 PG 32.0 (H)   MCHC Latest Ref Range: 32.0 - 36.0 % 35.2   MPV Latest Ref Range: 7.5 - 11.1 FL 10.1   RDW Latest Ref Range: 11.7 - 14.9 % 11.9   Platelet Count Latest Ref Range: 140 - 440 K/CU    Lymphocyte % Latest Ref Range: 24 - 44 % 30.2   Monocytes % Latest Ref Range: 0 - 4 % 8.7 (H)   Eosinophils % Latest Ref Range: 0 - 3 % 2.7   Basophils % Latest Ref Range: 0 - 1 % 0.3   Lymphocytes Absolute Latest Units: K/CU MM 1.9   Monocytes Absolute Latest Units: K/CU MM 0.6   Eosinophils Absolute Latest Units: K/CU MM 0.2   Basophils Absolute Latest Units: K/CU MM 0.0   Differential Type Unknown AUTOMATED DIFFERENTIAL   Segs Relative Latest Ref Range: 36 - 66 % 57.9   Segs Absolute Latest Units: K/CU MM 3.7   Nucleated RBC % Latest Units: % 0.0   Immature Neutrophil % Latest Ref Range: 0 - 0.43 % 0.2   Total Immature Neutrophil Latest Units: K/CU MM 0.01   Total Nucleated RBC Latest Units: K/CU MM 0.0      Ref. Range 10/5/2021 12:38   D-Dimer, Quant Latest Ref Range: <230 NG/mL(DDU) <200      Ref. Range 10/5/2021 12:38   Lipase Latest Ref Range: 13 - 60 IU/L 34     Recent Imaging     XR CHEST PORTABLE [[id number]] Collected: 10/05/21 1409     Order Status: Completed Updated: 10/05/21 1412     Narrative:       EXAMINATION:   ONE XRAY VIEW OF THE CHEST     10/5/2021 1:32 pm     COMPARISON:   Chest radiograph July 24, 2021, August 6, 2020     HISTORY:   ORDERING SYSTEM PROVIDED HISTORY: Chest pain   TECHNOLOGIST PROVIDED HISTORY:   Reason for exam:->Chest pain   Reason for Exam: chest pain     FINDINGS:   The lungs are without acute focal process.  There is no effusion or   pneumothorax. The cardiomediastinal silhouette is without acute process. The   osseous structures are without acute process.      Impression:       No acute pulmonary process.          Relevant labs and imaging reviewed    ASSESSMENT AND PLAN     #.

## 2021-10-05 NOTE — ED PROVIDER NOTES
This is an EKG note, I did not see or evaluate this patient. Patient was independently seen by midlevel. EKG was independently interpreted, without cardiology present.     Normal sinus rhythm, rate 70, , , QTc 457, no acute ST elevation, similar to previous EKG done on 7/24/2021     Modesta Davila DO  10/05/21 144

## 2021-10-05 NOTE — ED NOTES
Bed: ED-30  Expected date:   Expected time:   Means of arrival:   Comments:  [place] bed 2       Shanita Elise  10/05/21 [id number]

## 2021-10-05 NOTE — ED NOTES
Spoke with patient wife Kandice Saint) and updated her on patient.       Tommy Butts RN  10/05/21 [id number]

## 2021-10-05 NOTE — Clinical Note
Patient Class: Inpatient [101]   REQUIRED: Diagnosis: Chest pain [900470]   Estimated Length of Stay: Estimated stay of more than 2 midnights   Admitting Provider: Alexander May [[id number]]   Telemetry/Cardiac Monitoring Required?: Yes

## 2021-10-06 ENCOUNTER — APPOINTMENT (OUTPATIENT)
Dept: NUCLEAR MEDICINE | Age: 66
DRG: 287 | End: 2021-10-06
Payer: OTHER GOVERNMENT

## 2021-10-06 LAB
ANION GAP SERPL CALCULATED.3IONS-SCNC: 9 MMOL/L (ref 4–16)
BUN BLDV-MCNC: 13 MG/DL (ref 6–23)
CALCIUM SERPL-MCNC: 8.5 MG/DL (ref 8.3–10.6)
CHLORIDE BLD-SCNC: 103 MMOL/L (ref 99–110)
CO2: 23 MMOL/L (ref 21–32)
CREAT SERPL-MCNC: 0.9 MG/DL (ref 0.9–1.3)
EKG ATRIAL RATE: 69 BPM
EKG ATRIAL RATE: 70 BPM
EKG DIAGNOSIS: NORMAL
EKG DIAGNOSIS: NORMAL
EKG P AXIS: 59 DEGREES
EKG P AXIS: 61 DEGREES
EKG P-R INTERVAL: 156 MS
EKG P-R INTERVAL: 166 MS
EKG Q-T INTERVAL: 424 MS
EKG Q-T INTERVAL: 452 MS
EKG QRS DURATION: 110 MS
EKG QRS DURATION: 110 MS
EKG QTC CALCULATION (BAZETT): 457 MS
EKG QTC CALCULATION (BAZETT): 484 MS
EKG R AXIS: 11 DEGREES
EKG R AXIS: 17 DEGREES
EKG T AXIS: 31 DEGREES
EKG T AXIS: 74 DEGREES
EKG VENTRICULAR RATE: 69 BPM
EKG VENTRICULAR RATE: 70 BPM
GFR AFRICAN AMERICAN: >60 ML/MIN/1.73M2
GFR NON-AFRICAN AMERICAN: >60 ML/MIN/1.73M2
GLUCOSE BLD-MCNC: 106 MG/DL (ref 70–99)
GLUCOSE BLD-MCNC: 195 MG/DL (ref 70–99)
GLUCOSE BLD-MCNC: 207 MG/DL (ref 70–99)
GLUCOSE BLD-MCNC: 251 MG/DL (ref 70–99)
GLUCOSE BLD-MCNC: 366 MG/DL (ref 70–99)
HCT VFR BLD CALC: 45.2 % (ref 42–52)
HEMOGLOBIN: 15.9 GM/DL (ref 13.5–18)
LV EF: 53 %
LV EF: 54 %
LVEF MODALITY: NORMAL
LVEF MODALITY: NORMAL
MCH RBC QN AUTO: 32 PG (ref 27–31)
MCHC RBC AUTO-ENTMCNC: 35.2 % (ref 32–36)
MCV RBC AUTO: 90.9 FL (ref 78–100)
PDW BLD-RTO: 11.9 % (ref 11.7–14.9)
PLATELET # BLD: 232 K/CU MM (ref 140–440)
PMV BLD AUTO: 10.3 FL (ref 7.5–11.1)
POTASSIUM SERPL-SCNC: 3.6 MMOL/L (ref 3.5–5.1)
RBC # BLD: 4.97 M/CU MM (ref 4.6–6.2)
SODIUM BLD-SCNC: 135 MMOL/L (ref 135–145)
TROPONIN T: 0.07 NG/ML
TROPONIN T: 0.09 NG/ML
WBC # BLD: 7 K/CU MM (ref 4–10.5)

## 2021-10-06 PROCEDURE — 94761 N-INVAS EAR/PLS OXIMETRY MLT: CPT

## 2021-10-06 PROCEDURE — 93005 ELECTROCARDIOGRAM TRACING: CPT | Performed by: INTERNAL MEDICINE

## 2021-10-06 PROCEDURE — 6370000000 HC RX 637 (ALT 250 FOR IP): Performed by: NURSE PRACTITIONER

## 2021-10-06 PROCEDURE — 93010 ELECTROCARDIOGRAM REPORT: CPT | Performed by: INTERNAL MEDICINE

## 2021-10-06 PROCEDURE — 85027 COMPLETE CBC AUTOMATED: CPT

## 2021-10-06 PROCEDURE — B2111ZZ FLUOROSCOPY OF MULTIPLE CORONARY ARTERIES USING LOW OSMOLAR CONTRAST: ICD-10-PCS | Performed by: INTERNAL MEDICINE

## 2021-10-06 PROCEDURE — C1894 INTRO/SHEATH, NON-LASER: HCPCS

## 2021-10-06 PROCEDURE — 99233 SBSQ HOSP IP/OBS HIGH 50: CPT | Performed by: INTERNAL MEDICINE

## 2021-10-06 PROCEDURE — 78452 HT MUSCLE IMAGE SPECT MULT: CPT

## 2021-10-06 PROCEDURE — 3430000000 HC RX DIAGNOSTIC RADIOPHARMACEUTICAL: Performed by: INTERNAL MEDICINE

## 2021-10-06 PROCEDURE — 93459 L HRT ART/GRFT ANGIO: CPT

## 2021-10-06 PROCEDURE — 6370000000 HC RX 637 (ALT 250 FOR IP): Performed by: INTERNAL MEDICINE

## 2021-10-06 PROCEDURE — A9500 TC99M SESTAMIBI: HCPCS | Performed by: INTERNAL MEDICINE

## 2021-10-06 PROCEDURE — 2580000003 HC RX 258: Performed by: INTERNAL MEDICINE

## 2021-10-06 PROCEDURE — B2131ZZ FLUOROSCOPY OF MULTIPLE CORONARY ARTERY BYPASS GRAFTS USING LOW OSMOLAR CONTRAST: ICD-10-PCS | Performed by: INTERNAL MEDICINE

## 2021-10-06 PROCEDURE — C1760 CLOSURE DEV, VASC: HCPCS

## 2021-10-06 PROCEDURE — 93306 TTE W/DOPPLER COMPLETE: CPT

## 2021-10-06 PROCEDURE — 4A023N7 MEASUREMENT OF CARDIAC SAMPLING AND PRESSURE, LEFT HEART, PERCUTANEOUS APPROACH: ICD-10-PCS | Performed by: INTERNAL MEDICINE

## 2021-10-06 PROCEDURE — 1200000000 HC SEMI PRIVATE

## 2021-10-06 PROCEDURE — 80048 BASIC METABOLIC PNL TOTAL CA: CPT

## 2021-10-06 PROCEDURE — 6360000004 HC RX CONTRAST MEDICATION

## 2021-10-06 PROCEDURE — 2500000003 HC RX 250 WO HCPCS

## 2021-10-06 PROCEDURE — 84484 ASSAY OF TROPONIN QUANT: CPT

## 2021-10-06 PROCEDURE — 36415 COLL VENOUS BLD VENIPUNCTURE: CPT

## 2021-10-06 PROCEDURE — 93017 CV STRESS TEST TRACING ONLY: CPT

## 2021-10-06 PROCEDURE — 93459 L HRT ART/GRFT ANGIO: CPT | Performed by: INTERNAL MEDICINE

## 2021-10-06 PROCEDURE — B2151ZZ FLUOROSCOPY OF LEFT HEART USING LOW OSMOLAR CONTRAST: ICD-10-PCS | Performed by: INTERNAL MEDICINE

## 2021-10-06 PROCEDURE — 2709999900 HC NON-CHARGEABLE SUPPLY

## 2021-10-06 PROCEDURE — 6360000002 HC RX W HCPCS

## 2021-10-06 RX ORDER — RANOLAZINE 500 MG/1
500 TABLET, EXTENDED RELEASE ORAL 2 TIMES DAILY
Status: DISCONTINUED | OUTPATIENT
Start: 2021-10-06 | End: 2021-10-07 | Stop reason: HOSPADM

## 2021-10-06 RX ORDER — ISOSORBIDE MONONITRATE 30 MG/1
30 TABLET, EXTENDED RELEASE ORAL DAILY
Status: DISCONTINUED | OUTPATIENT
Start: 2021-10-06 | End: 2021-10-06

## 2021-10-06 RX ORDER — RANOLAZINE 500 MG/1
500 TABLET, EXTENDED RELEASE ORAL 2 TIMES DAILY
Status: DISCONTINUED | OUTPATIENT
Start: 2021-10-06 | End: 2021-10-06

## 2021-10-06 RX ORDER — ISOSORBIDE MONONITRATE 60 MG/1
60 TABLET, EXTENDED RELEASE ORAL DAILY
Status: DISCONTINUED | OUTPATIENT
Start: 2021-10-07 | End: 2021-10-07 | Stop reason: HOSPADM

## 2021-10-06 RX ORDER — TRAMADOL HYDROCHLORIDE 50 MG/1
50 TABLET ORAL ONCE
Status: COMPLETED | OUTPATIENT
Start: 2021-10-06 | End: 2021-10-06

## 2021-10-06 RX ADMIN — INSULIN LISPRO 5 UNITS: 100 INJECTION, SOLUTION INTRAVENOUS; SUBCUTANEOUS at 21:01

## 2021-10-06 RX ADMIN — TRAMADOL HYDROCHLORIDE 50 MG: 50 TABLET, FILM COATED ORAL at 23:14

## 2021-10-06 RX ADMIN — KIT FOR THE PREPARATION OF TECHNETIUM TC99M SESTAMIBI 10 MILLICURIE: 1 INJECTION, POWDER, LYOPHILIZED, FOR SOLUTION PARENTERAL at 13:08

## 2021-10-06 RX ADMIN — CARVEDILOL 6.25 MG: 6.25 TABLET, FILM COATED ORAL at 21:00

## 2021-10-06 RX ADMIN — INSULIN GLARGINE 10 UNITS: 100 INJECTION, SOLUTION SUBCUTANEOUS at 21:01

## 2021-10-06 RX ADMIN — RANOLAZINE 500 MG: 500 TABLET, EXTENDED RELEASE ORAL at 21:00

## 2021-10-06 RX ADMIN — KIT FOR THE PREPARATION OF TECHNETIUM TC99M SESTAMIBI 30 MILLICURIE: 1 INJECTION, POWDER, LYOPHILIZED, FOR SOLUTION PARENTERAL at 13:07

## 2021-10-06 RX ADMIN — INSULIN LISPRO 6 UNITS: 100 INJECTION, SOLUTION INTRAVENOUS; SUBCUTANEOUS at 13:15

## 2021-10-06 RX ADMIN — INSULIN LISPRO 4 UNITS: 100 INJECTION, SOLUTION INTRAVENOUS; SUBCUTANEOUS at 09:21

## 2021-10-06 RX ADMIN — ATORVASTATIN CALCIUM 40 MG: 40 TABLET, FILM COATED ORAL at 17:40

## 2021-10-06 RX ADMIN — ASPIRIN 325 MG ORAL TABLET 325 MG: 325 PILL ORAL at 17:40

## 2021-10-06 RX ADMIN — SODIUM CHLORIDE, PRESERVATIVE FREE 10 ML: 5 INJECTION INTRAVENOUS at 21:03

## 2021-10-06 RX ADMIN — SPIRONOLACTONE 25 MG: 25 TABLET ORAL at 17:40

## 2021-10-06 RX ADMIN — LOSARTAN POTASSIUM 100 MG: 50 TABLET, FILM COATED ORAL at 17:39

## 2021-10-06 RX ADMIN — FUROSEMIDE 40 MG: 20 TABLET ORAL at 17:40

## 2021-10-06 RX ADMIN — SODIUM CHLORIDE, PRESERVATIVE FREE 10 ML: 5 INJECTION INTRAVENOUS at 09:21

## 2021-10-06 ASSESSMENT — PAIN SCALES - GENERAL
PAINLEVEL_OUTOF10: 0
PAINLEVEL_OUTOF10: 8

## 2021-10-06 NOTE — ED NOTES
Pt complaining of uncomfortable stretcher.  EVS called for a hospital bed for patient comfort     William Ventura RN  10/05/21 2014

## 2021-10-06 NOTE — PROGRESS NOTES
Hospitalist Progress Note      Name:  Ace Zafar /Age/Sex: 1955  (73 y.o. male)   MRN & CSN:  [id number] & [id number] Admission Date/Time: 10/5/2021  1:13 PM   Location:  [address] [place] [address] PCP: Priyanka Bey Day: 2                                              ROXANE Augustin - CNP      Assessment and Plan:   Ace Zafar is a 73 y.o.  male  who presents with chest pain     Chest pain: Evaluate for acute coronary syndrome. Troponin elevated 0.073, 0.089   EKG with no acute ischemic changes   NM stress test abnormal    Echocardiogram completed Left ventricular systolic function is normal.    Ejection fraction is visually estimated at 50-55%.  Mild left ventricular hypertrophy.    Grade I diastolic dysfunction. Risk factor modification   Cardiology following. Continue aspirin, statin, carvedilol, losartan.     Uncontrolled diabetes mellitus type 2, not on insulin. Last A1c 12.0) 2021   Patient admits to being noncompliant with diet, medications   Does not check his blood glucose at home. Glipizide, Metformin. As per medication list from  [facility] patient is supposed to be on Januvia. Will require close outpatient follow-up     Chronic systolic congestive heart failure    Patient appears compensated    Lasix 40 mg twice daily     Monitor for clinical signs of hypervolemia     Hypertension-Continue carvedilol, losartan     Hyperlipidemia-Continue atorvastatin     Obesity- Body mass index is 34.15 kg/m². Lifestyle modifications needed    This patient was seen and examined autonomously  A hospitalist attending physician was available for questions/consultation as needed.      Diet Diet NPO   DVT Prophylaxis [] Lovenox, []  Heparin, [] SCDs, [] Ambulation   GI Prophylaxis [] PPI,  [] H2 Blocker,  [] Carafate,  [] Diet/Tube Feeds   Code Status Full Code   Disposition OBS     -Patient assessment and plan discussed with supervising physician-  History of Present Illness:     Nelson Dc is a 73 y.o.  male, who presented with Chest Pain (started while eating breakfast today, nitro x 3 no improvement)  . Patient was initially admitted 10/5/2021 after episode of chest pain. Reported as \"tennis ball stuck in my esophagus\" and points left substernal fifth ICS. Has a history of CAD s/p CABG x4 vessel 4/9/2020    Ten point ROS reviewed negative, unless as noted above    Objective: Intake/Output Summary (Last 24 hours) at 10/6/2021 1508  Last data filed at 10/6/2021 0921  Gross per 24 hour   Intake 10 ml   Output --   Net 10 ml      Vitals:   Vitals:    10/05/21 2130 10/05/21 2249 10/06/21 0801 10/06/21 0946   BP: 125/79 (!) 174/108 136/86    Pulse: 67 69 61    Resp: 12 20 17 16   Temp:  97.5 °F (36.4 °C) 97.8 °F (36.6 °C)    TempSrc:  Oral Oral    SpO2: 95% 97% 96% 96%   Weight:       Height:         Physical Exam: 10/06/21     . Gen:  awake, alert, cooperative, no apparent distress obese body habitus  Head/Eyes:  Normocephalic atraumatic, EOMI   NECK:   symmetrical, trachea midline  LUNGS: Normal Effort/ symmetry movement   CARDIOVASCULAR:  Normal rate Tele SR  ABDOMEN: Non tender, non distended, no HSM noted. MUSCULOSKELETAL: no gross deformities  NEUROLOGIC: Alert and Oriented,  Cranial nerves II-XII are grossly intact.    SKIN:  no bruising or bleeding, normal skin color,  no redness      Medications:   Medications:    isosorbide mononitrate  30 mg Oral Daily    insulin regular  10 Units SubCUTAneous Once    sodium chloride flush  5-40 mL IntraVENous 2 times per day    enoxaparin  40 mg SubCUTAneous Nightly    insulin lispro  0-12 Units SubCUTAneous TID WC    insulin lispro  0-6 Units SubCUTAneous Nightly    insulin glargine  10 Units SubCUTAneous Nightly    aspirin  325 mg Oral Daily    carvedilol  6.25 mg Oral BID    furosemide  40 mg Oral BID    losartan  100 mg Oral Daily    spironolactone  25 mg Oral Daily    atorvastatin  40 mg Oral Daily Infusions:    sodium chloride      dextrose       PRN Meds: sodium chloride flush, 5-40 mL, PRN  sodium chloride, 25 mL, PRN  ondansetron, 4 mg, Q8H PRN   Or  ondansetron, 4 mg, Q6H PRN  acetaminophen, 650 mg, Q6H PRN   Or  acetaminophen, 650 mg, Q6H PRN  polyethylene glycol, 17 g, Daily PRN  glucose, 15 g, PRN  dextrose, 12.5 g, PRN  glucagon (rDNA), 1 mg, PRN  dextrose, 100 mL/hr, PRN        LABS  CBC   Recent Labs     10/05/21  1239 10/06/21  0216   WBC 6.4 7.0   HGB 16.0 15.9   HCT 45.5 45.2    232      RENAL  Recent Labs     10/05/21  1239 10/06/21  0216   * 135   K 4.2 3.6   CL 98* 103   CO2 21 23   BUN 13 13   CREATININE 0.8* 0.9     LFT'S  Recent Labs     10/05/21  1239   AST 13*   ALT 20   BILITOT 0.4   ALKPHOS 139*       Radiology this visit:  Reviewed. Echocardiogram complete 2D with doppler with color    Result Date: 10/6/2021  Transthoracic Echocardiography Report (TTE)  Demographics   Patient Name       Tabitha RIVAS      Date of Study       10/06/2021   Date of Birth      1955         Gender              Male   Age                73 year(s)         Race                   Patient Number     [id number]         Room Number         6583   Visit Number       [id number]   Corporate ID       [id number]   Accession Number   [id number]         Nikkie Valdes RVT   Ordering Physician Rachelle Magallanes MD                 Physician           MD  Procedure Type of Study   TTE procedure:ECHOCARDIOGRAM COMPLETE 2D W DOPPLER W COLOR. Procedure Date Date: 10/06/2021 Start: 09:30 AM Study Location: Portable Technical Quality: Fair visualization Indications:Chest pain.  Patient Status: Routine Height: 70 inches Weight: 238 pounds BSA: 2.25 m2 BMI: 34.15 kg/m2 HR: 62 bpm BP: 136/86 mmHg  Conclusions   Summary  Left ventricular systolic function is normal.  Ejection fraction is visually estimated at 50-55%. Mild left ventricular hypertrophy. Grade I diastolic dysfunction. No evidence of any pericardial effusion. Signature   ------------------------------------------------------------------  Electronically signed by Aide Mclean MD (Interpreting  physician) on 10/06/2021 at 11:30 AM  ------------------------------------------------------------------   Findings   Left Ventricle  Left ventricular systolic function is normal.  Ejection fraction is visually estimated at 50-55%. Mild left ventricular hypertrophy. Grade I diastolic dysfunction. No regional wall motion abnormalites. Left ventricle size is normal.   Left Atrium  Essentially normal left atrium. Right Atrium  Essentially normal right atrium. Right Ventricle  Essentially normal right ventricle. Aortic Valve  Structurally normal aortic valve. Mitral Valve  Structurally normal mitral valve. Tricuspid Valve  Structurally normal tricuspid valve. Pulmonic Valve  The pulmonic valve was not well visualized. Pericardial Effusion  No evidence of any pericardial effusion. Pleural Effusion  No evidence of pleural effusion. Miscellaneous  Aorta was not clearly visualized.   M-Mode/2D Measurements & Calculations   LV Diastolic Dimension:  LV Systolic Dimension:  AO Root Dimension: 3.9 cmLA  5.07 cm                  3.88 cm                 Area: 18.4 cm2  LV FS:23.5 %             LV Volume Diastolic: 72  LV PW Diastolic: 7.82 cm ml  LV PW Systolic: 1.3 cm   LV Volume Systolic: 36  Septum Diastolic: 4.85   ml                      RV Diastolic Dimension:  cm                       LV EDV/LV EDV Index: 72 2.52 cm  Septum Systolic: 9.44 cm PD/63 R4RG ESV/LV ESV  CO: 5.1 l/min            Index: 36 ml/16 m2  CI: 2.27 l/m*m2          EF Calculated (A4C): 50 LA volume/Index: 50 ml                           %                       /99N8  LV Area Diastolic: 68.9  EF Calculated (2D):  cm2 51.8 %  LV Area Systolic: 43.4  cm2                      LV Length: 8.39 cm                            LVOT: 2.4 cm  Doppler Measurements & Calculations   MV Peak E-Wave: 66.1    AV Peak Velocity: 86.8 cm/s  LVOT Peak Velocity:  cm/s                    AV Peak Gradient: 3.01 mmHg  81.2 cm/s  MV Peak A-Wave: 115     AV Mean Velocity: 66.9 cm/s  LVOT Mean Velocity:  cm/s                    AV Mean Gradient: 2 mmHg     60.7 cm/s  MV E/A Ratio: 0.57      AV VTI: 20.2 cm              LVOT Peak Gradient: 3  MV Peak Gradient: 1.75  AV Area (Continuity):4.07    mmHgLVOT Mean Gradient:  mmHg                    cm2                          2 mmHg   MV P1/2t: 55 msec       LVOT VTI: 18.2 cm  MVA by PHT:4 cm2   MV E' Septal Velocity:  4.93 cm/s  MV E' Lateral Velocity:  8.55 cm/s  MV E/E' septal: 13.41  MV E/E' lateral: 7.73      XR CHEST PORTABLE    Result Date: 10/5/2021  EXAMINATION: ONE XRAY VIEW OF THE CHEST 10/5/2021 1:32 pm COMPARISON: Chest radiograph July 24, 2021, August 6, 2020 HISTORY: ORDERING SYSTEM PROVIDED HISTORY: Chest pain TECHNOLOGIST PROVIDED HISTORY: Reason for exam:->Chest pain Reason for Exam: chest pain FINDINGS: The lungs are without acute focal process. There is no effusion or pneumothorax. The cardiomediastinal silhouette is without acute process. The osseous structures are without acute process. No acute pulmonary process.      NM MYOCARDIAL SPECT REST EXERCISE OR RX    Result Date: 10/6/2021  Cardiac Perfusion Imaging   Demographics   Patient Name      Zaynab RIVAS      Date of study        10/06/2021   Date of Birth     1955         Gender               Male   Age               73 year(s)         Race                    Patient Number    [id number]         Room Number          7012   Visit Number      [id number]          Height               70 inches   Corporate ID      [id number]           Weight               238 pounds   Accession Number  [id number] NM Technologist      Kemar Christopher                                                            [facility]                                                            Everett Catherine Reading  Physician         MD                 Cardiologist         MD   Conclusions   Summary  Medium sized defect of moderate severity which is reversible involving  anterior /apical wall of myocardium. Normal EF 54 % with normal ventricular contractility. Recommendation  Abnormal Stress MPI  Suggest LHC/Graft study   Signatures   ------------------------------------------------------------------  Electronically signed by Donna Smith MD (Interpreting  cardiologist) on 10/06/2021 at 14:50  ------------------------------------------------------------------  Procedure Procedure Type:   Nuclear Stress Test:Pharmacological, STRESS TEST, LEXISCAN, Myocardial  Perfusion Imaging with Pharm, NM MYOCARDIAL SPECT REST EXERCISE OR RX  Indications: CAD and Chest pain. Risk Factors   The patient risk factors include:prior CABG;physical activity, diabetes  mellitus, dyslipidemia and prior MI . Stress Protocols   Resting ECG  Normal sinus rhythm. Resting HR:77 bpm  Resting BP:150/88 mmHg  Stress Protocol:Exercise - Thony  Peak HR:131 bpm                  HR/BP product:15111  Peak BP:150/88 mmHg              Max exrecise: 7 METS  Predicted HR: 155 bpm  % of predicted HR: 85   Exercise duration: 05:11 min  Reason for termination:Completed   Symptoms  No symptoms with stress. Imaging Protocols   Rest                             Stress   Isotope:Sestamibi 99mTc          Isotope: Sestamibi 99mTc  Isotope dose:10.2 mCi            Isotope dose:32.4 mCi  Administration route: I.V. Administration route: I.V.   Injection Date:10/06/2021 07:45  Injection Date:10/06/2021 10:40  Scan Date:10/06/2021 08:45       Scan Date:10/06/2021 11:40   Technique:        SPECT Technique:        Gated                                                     SPECT   Procedure Description   Upon patient arrival, the patient is identified using two identifiers and  the physician order is verified. An IV is established and 8-11mCi of 99mTc  Sestamibi is intravenously injected and followed with 10mL 0.9% Normal  Saline flush. A circulation period of 45 minutes occurs prior to resting  SPECT imaging. After imaging is complete the patient is escorted to the  stress lab. The patient is connected to the ECG and blood pressure is  measured. The patient is assisted onto the treadmill and the appropriate  exercise protocol (Thony or Modified Thony) is selected and the patient  begins to exercise on the treadmill. Once the patient has reached 85% of the  target heart rate then the Nuclear Technologist intravenously injects  22-33mCi of 99mTc Sestamibi and 10mL 0.9% Normal Saline flush. After  completion, recovery, and removal of the IV, the patient rests during the  second circulation period of 45 minutes. Final stress SPECT gated imaging is  performed. The patient may return home or to their room after stress  imaging. The images are processed and final charting is completed and sent  to the appropriate cardiologist for interpretation and reporting. Perfusion Interpretation   Medium sized defect of moderate severity which is reversible involving  anterior /apical wall of myocardium. Normal EF 54 % with normal ventricular contractility.   Imaging Results     Study artifacts     1) Extracardiac activity     2) Diaphragmatic    attenuation     3) Breast attenuation     4) Motion     Summed scores     - Summed stress score: 5     - Summed rest score: 0     - Summed difference score:    5   Rest ejection  Ejection fraction:54 %  EDV :82 ml  ESV :38 ml  Stroke volume :44 ml  Medical History   Accession#:  [id number]  Admission Data Admission date: 10/05/2021 Admission Time: 13:13 Hospital Status: Inpatient.             Electronically signed by ROXANE Grubbs CNP on 10/6/2021 at 3:08 PM

## 2021-10-06 NOTE — CARE COORDINATION
Pt is not in room. CM screened pt for discharge planning. Pt has a PCP listed. Pt has insurance and is able to obtain prescriptions. CM collaborated with nursing. CM informed that pt is anxious to be discharged. CM will attempt to see pt prior to discharge.   1230 CM into see pt. Pt confirmed the above information. CM asked about bld sugars. Pt admitted to loving pepsi. He knows he is not supposed to drink so much pepsi but does not like diet. Pt admitted that he knows, he will figure out something. CM provided emotional support.  [facility]

## 2021-10-06 NOTE — PROGRESS NOTES
CARDIOLOGY PROGRESS NOTE                                                  Name:  Lucy Serra /Age/Sex: 1955  (73 y.o. male)   MRN & CSN:  [id number] & [id number] Admission Date/Time: 10/5/2021  1:13 PM   Location:  [facility] PCP: ROXANE Adan CNP         Admit Date:  10/5/2021  Hospital Day: 2      SUBJECTIVE:   Seen patient as follow up as consultation for CP    No chest pain. No shortness of breath  No palpations    TELEMETRY: Sinus       Intake/Output Summary (Last 24 hours) at 10/6/2021 1451  Last data filed at 10/6/2021 0921  Gross per 24 hour   Intake 10 ml   Output --   Net 10 ml       Assessment/Plan:     1. Chest Pain -recurrent, responsive nitroglycerin  2. History of coronary disease s/p CABG 2020  - LIMA LAD/diagonal jump graft. SVG OM SVG PDA     3.  Stress MPI for Risk Stratification today . Echocardiogram today     Continue with aspirin  Continue with Coreg  Added Imdur after stress test     4. Hyperlipidemia: Continue statin therapy  5. Essential hypertension: Continue with Aldactone, losartan, Coreg. We will add Imdur. 6. History of mixed heart failure with LVEF around 45 to 50%. Continue with Lasix/Aldactone/Coreg/losartan. Currently close to euvolemia  7. DVT prophylaxis on Lovenox             Past medical history:    has a past medical history of CAD (coronary artery disease), Diabetes mellitus ([facility], [place] [address] FHx: coronary artery disease, History of echocardiogram, History of exercise stress test, Hyperlipidemia, Hypertension, and S/P CABG x 4. Past surgical history:   has a past surgical history that includes Cholecystectomy; hernia repair; fracture surgery; Anus surgery; Colonoscopy (); Colonoscopy (2016); Endoscopy, colon, diagnostic (2016); Coronary artery bypass graft (2020); and Coronary artery bypass graft (N/A, 2020). Social History:   reports that he quit smoking about 27 years ago. His smoking use included cigarettes.  He started smoking about 47 years ago. He smoked 0.50 packs per day. He has never used smokeless tobacco. He reports that he does not drink alcohol and does not use drugs. Family history:  family history includes Cancer in his brother and father; Diabetes in his mother; Heart Disease in his father and mother. OBJECTIVE:     /86   Pulse 61   Temp 97.8 °F (36.6 °C) (Oral)   Resp 16   Ht 5' 10\" (1.778 m)   Wt 238 lb (108 kg)   SpO2 96%   BMI 34.15 kg/m²       Intake/Output Summary (Last 24 hours) at 10/6/2021 1451  Last data filed at 10/6/2021 8089  Gross per 24 hour   Intake 10 ml   Output --   Net 10 ml       Physical Exam:    Constitutional:  Well developed, Well nourished, No acute distress, Non-toxic appearance. HENT:  Normocephalic, Atraumatic, Bilateral external ears normal, Oropharynx moist, No oral exudates, Nose normal. Neck- Normal range of motion, No tenderness, Supple, No stridor. Eyes:  EOMI, Conjunctiva normal, No discharge. Respiratory:  Normal breath sounds, No respiratory distress, No wheezing, No chest tenderness. ,no use of accessory muscles, diaphragm movement is normal  Cardiovascular S1-S2 No Murmurs, added sounds. Normal rate rhythm. No rubs gallops. Carotid pulses and amplitude are normal no bruit noted. Pedal pulses normal femoral pulses normal.  No pedal edema  GI:  Bowel sounds normal, Soft, No tenderness  : No CVA tenderness. Musculoskeletal: No edema, No tenderness, No cyanosis, No clubbing. Back- No tenderness. Integument:  Warm, Dry, No erythema, No rash. Lymphatic:  No lymphadenopathy noted. Neurologic:  Alert & oriented x 3, No focal deficits noted.    Psychiatric:  Affect normal, Judgment normal, Mood normal.           MEDICATIONS:     isosorbide mononitrate  30 mg Oral Daily    insulin regular  10 Units SubCUTAneous Once    sodium chloride flush  5-40 mL IntraVENous 2 times per day    enoxaparin  40 mg SubCUTAneous Nightly    insulin lispro  0-12 Units SubCUTAneous TID     insulin lispro  0-6 Units SubCUTAneous Nightly    insulin glargine  10 Units SubCUTAneous Nightly    aspirin  325 mg Oral Daily    carvedilol  6.25 mg Oral BID    furosemide  40 mg Oral BID    losartan  100 mg Oral Daily    spironolactone  25 mg Oral Daily    atorvastatin  40 mg Oral Daily      sodium chloride      dextrose       sodium chloride flush, sodium chloride, ondansetron **OR** ondansetron, acetaminophen **OR** acetaminophen, polyethylene glycol, glucose, dextrose, glucagon (rDNA), dextrose  Allergies   Allergen Reactions    Lisinopril      Other reaction(s): Cough    Niacin      Other reaction(s): Flushing    Vicodin [Hydrocodone-Acetaminophen] Other (See Comments)     Dislikes the feeling it gives him    Percocet [Oxycodone-Acetaminophen] Nausea And Vomiting       Lab Data:  CBC:   Recent Labs     10/05/21  1239 10/06/21  0216   WBC 6.4 7.0   HGB 16.0 15.9   HCT 45.5 45.2   MCV 91.0 90.9    232     BMP:   Recent Labs     10/05/21  1239 10/06/21  0216   * 135   K 4.2 3.6   CL 98* 103   CO2 21 23   BUN 13 13   CREATININE 0.8* 0.9     LIVER PROFILE:   Recent Labs     10/05/21  1238 10/05/21  1239   AST  --  13*   ALT  --  20   LIPASE 34  --    BILITOT  --  0.4   ALKPHOS  --  139*        Elpidio Hedrick MD, MD 10/6/2021 2:51 PM

## 2021-10-06 NOTE — PROGRESS NOTES
Plan for cath today due to abnormal stress test   Alternates and risk of the procedure were dicussed in detail  Patient is in agreement to proceed  Mallampati is 2  ASA is  3

## 2021-10-07 VITALS
SYSTOLIC BLOOD PRESSURE: 114 MMHG | HEIGHT: 70 IN | RESPIRATION RATE: 18 BRPM | WEIGHT: 238 LBS | HEART RATE: 57 BPM | BODY MASS INDEX: 34.07 KG/M2 | OXYGEN SATURATION: 94 % | DIASTOLIC BLOOD PRESSURE: 78 MMHG | TEMPERATURE: 97.7 F

## 2021-10-07 PROBLEM — I20.0 UNSTABLE ANGINA PECTORIS (HCC): Status: ACTIVE | Noted: 2021-10-07

## 2021-10-07 LAB
GLUCOSE BLD-MCNC: 192 MG/DL (ref 70–99)
GLUCOSE BLD-MCNC: 262 MG/DL (ref 70–99)

## 2021-10-07 PROCEDURE — 2580000003 HC RX 258: Performed by: INTERNAL MEDICINE

## 2021-10-07 PROCEDURE — 6370000000 HC RX 637 (ALT 250 FOR IP): Performed by: INTERNAL MEDICINE

## 2021-10-07 PROCEDURE — 82962 GLUCOSE BLOOD TEST: CPT

## 2021-10-07 PROCEDURE — 99232 SBSQ HOSP IP/OBS MODERATE 35: CPT | Performed by: INTERNAL MEDICINE

## 2021-10-07 PROCEDURE — 6360000002 HC RX W HCPCS: Performed by: INTERNAL MEDICINE

## 2021-10-07 PROCEDURE — APPSS60 APP SPLIT SHARED TIME 46-60 MINUTES: Performed by: NURSE PRACTITIONER

## 2021-10-07 RX ORDER — ISOSORBIDE MONONITRATE 60 MG/1
60 TABLET, EXTENDED RELEASE ORAL DAILY
Qty: 30 TABLET | Refills: 3 | Status: SHIPPED | OUTPATIENT
Start: 2021-10-07

## 2021-10-07 RX ORDER — RANOLAZINE 500 MG/1
500 TABLET, EXTENDED RELEASE ORAL 2 TIMES DAILY
Qty: 60 TABLET | Refills: 3 | Status: SHIPPED | OUTPATIENT
Start: 2021-10-07

## 2021-10-07 RX ADMIN — LOSARTAN POTASSIUM 100 MG: 50 TABLET, FILM COATED ORAL at 09:36

## 2021-10-07 RX ADMIN — ASPIRIN 325 MG ORAL TABLET 325 MG: 325 PILL ORAL at 09:36

## 2021-10-07 RX ADMIN — INSULIN LISPRO 2 UNITS: 100 INJECTION, SOLUTION INTRAVENOUS; SUBCUTANEOUS at 09:36

## 2021-10-07 RX ADMIN — ONDANSETRON 4 MG: 2 INJECTION INTRAMUSCULAR; INTRAVENOUS at 08:08

## 2021-10-07 RX ADMIN — ISOSORBIDE MONONITRATE 60 MG: 60 TABLET, EXTENDED RELEASE ORAL at 09:36

## 2021-10-07 RX ADMIN — SPIRONOLACTONE 25 MG: 25 TABLET ORAL at 09:36

## 2021-10-07 RX ADMIN — ATORVASTATIN CALCIUM 40 MG: 40 TABLET, FILM COATED ORAL at 09:36

## 2021-10-07 RX ADMIN — CARVEDILOL 6.25 MG: 6.25 TABLET, FILM COATED ORAL at 09:36

## 2021-10-07 RX ADMIN — SODIUM CHLORIDE, PRESERVATIVE FREE 10 ML: 5 INJECTION INTRAVENOUS at 09:36

## 2021-10-07 RX ADMIN — FUROSEMIDE 40 MG: 20 TABLET ORAL at 09:36

## 2021-10-07 RX ADMIN — RANOLAZINE 500 MG: 500 TABLET, EXTENDED RELEASE ORAL at 09:36

## 2021-10-07 ASSESSMENT — PAIN SCALES - GENERAL
PAINLEVEL_OUTOF10: 6
PAINLEVEL_OUTOF10: 0

## 2021-10-07 NOTE — PROGRESS NOTES
Hospitalist Progress Note      Name:  Ze Regalado /Age/Sex: 1955  (73 y.o. male)   MRN & CSN:  [id number] & [id number] Admission Date/Time: 10/5/2021  1:13 PM   [facility] PCP: Darrin Penn 112 Day: 3                                              ROXANE Moreno - CNP      Assessment and Plan:   Ze Regalado is a 73 y.o.  male  who presents with chest pain     Chest pain: Evaluate for acute coronary syndrome. Troponin elevated 0.073, 0.089   EKG with no acute ischemic changes   NM stress test abnormal    Echocardiogram completed Left ventricular systolic function is normal.  Ejection fraction is visually estimated at 50-55%.  Mild left ventricular hypertrophy.  Grade I diastolic dysfunction. Risk factor modification   Cardiology following. Continue aspirin, statin, carvedilol, losartan. [facility] (10/6)     Uncontrolled diabetes mellitus type 2, not on insulin. Last A1c 12.0) 2021   Patient admits to being noncompliant with diet, medications   Does not check his blood glucose at home. Glipizide, Metformin/     As per medication list from  [facility] patient is supposed to be on Januvia. Will require close outpatient follow-up     Chronic systolic congestive heart failure    Patient appears compensated    Lasix 40 mg twice daily     Monitor for clinical signs of hypervolemia     Hypertension-Continue carvedilol, losartan     Hyperlipidemia-Continue atorvastatin     Obesity- Body mass index is 34.15 kg/m². Lifestyle modifications needed    This patient was seen and examined autonomously  A hospitalist attending physician was available for questions/consultation as needed. Diet ADULT DIET; Regular; 4 carb choices (60 gm/meal); Low Fat/Low Chol/High Fiber/2 gm Na;  No Caffeine   DVT Prophylaxis [x] Lovenox, []  Heparin, [] SCDs, [] Ambulation   GI Prophylaxis [] PPI,  [] H2 Blocker,  [] Carafate,  [x] Diet/Tube Feeds   Code Status Full Code Disposition IP     -Patient assessment and plan discussed with supervising physician-  History of Present Illness:     Ema Clark is a 73 y.o.  male, who presented with Chest Pain (started while eating breakfast today, nitro x 3 no improvement)  . Patient was initially admitted 10/5/2021 after episode of chest pain. Reported as \"tennis ball stuck in my esophagus\" and points left substernal fifth ICS. Has a history of CAD s/p CABG x4 vessel 4/9/2020    [facility] yesterday  States medial groin below cath puncture site have severe pain with ambulation     Ten point ROS reviewed negative, unless as noted above    Objective: Intake/Output Summary (Last 24 hours) at 10/7/2021 0938  Last data filed at 10/6/2021 1837  Gross per 24 hour   Intake --   Output 450 ml   Net -450 ml      Vitals:   Vitals:    10/06/21 1836 10/06/21 2101 10/07/21 0517 10/07/21 0935   BP: 107/73 (!) 145/85 119/69 114/78   Pulse: 79 79 63 57   Resp: 17 22 18 18   Temp:  98.2 °F (36.8 °C) 97.7 °F (36.5 °C) 97.7 °F (36.5 °C)   TempSrc:  Oral Oral Oral   SpO2: 94% 95% 93% 94%   Weight:       Height:         Physical Exam: 10/07/21     . Gen:  awake, alert, cooperative, no apparent distress obese body habitus  Head/Eyes:  Normocephalic atraumatic, EOMI   NECK:   symmetrical, trachea midline  LUNGS: Normal Effort/ symmetry movement   CARDIOVASCULAR:  Normal rate Tele SR  ABDOMEN: Non tender, non distended, no HSM noted. MUSCULOSKELETAL: no gross deformities  NEUROLOGIC: Alert and Oriented,  Cranial nerves II-XII are grossly intact. SKIN:  no bruising or bleeding, normal skin color,  no redness. Cath puncture site appears normal. Medial groin hematoma.  TTP     Medications:   Medications:    isosorbide mononitrate  60 mg Oral Daily    ranolazine  500 mg Oral BID    insulin regular  10 Units SubCUTAneous Once    sodium chloride flush  5-40 mL IntraVENous 2 times per day    enoxaparin  40 mg SubCUTAneous Nightly    insulin lispro  0-12 Units SubCUTAneous TID WC    insulin lispro  0-6 Units SubCUTAneous Nightly    insulin glargine  10 Units SubCUTAneous Nightly    aspirin  325 mg Oral Daily    carvedilol  6.25 mg Oral BID    furosemide  40 mg Oral BID    losartan  100 mg Oral Daily    spironolactone  25 mg Oral Daily    atorvastatin  40 mg Oral Daily      Infusions:    sodium chloride      dextrose       PRN Meds: sodium chloride flush, 5-40 mL, PRN  sodium chloride, 25 mL, PRN  ondansetron, 4 mg, Q8H PRN   Or  ondansetron, 4 mg, Q6H PRN  acetaminophen, 650 mg, Q6H PRN   Or  acetaminophen, 650 mg, Q6H PRN  polyethylene glycol, 17 g, Daily PRN  glucose, 15 g, PRN  dextrose, 12.5 g, PRN  glucagon (rDNA), 1 mg, PRN  dextrose, 100 mL/hr, PRN      LABS  CBC   Recent Labs     10/05/21  1239 10/06/21  0216   WBC 6.4 7.0   HGB 16.0 15.9   HCT 45.5 45.2    232      RENAL  Recent Labs     10/05/21  1239 10/06/21  0216   * 135   K 4.2 3.6   CL 98* 103   CO2 21 23   BUN 13 13   CREATININE 0.8* 0.9     LFT'S  Recent Labs     10/05/21  1239   AST 13*   ALT 20   BILITOT 0.4   ALKPHOS 139*       Radiology this visit:  Reviewed.     Echocardiogram complete 2D with doppler with color    Result Date: 10/6/2021  Transthoracic Echocardiography Report (TTE)  Demographics   Patient Name       Rose RIVAS      Date of Study       10/06/2021   Date of Birth      1955         Gender              Male   Age                73 year(s)         Race                   Patient Number     [id number]         Room Number         7666   Visit Number       [id number]   Corporate ID       [id number]   Accession Number   [id number]         GARETH GuyT   Ordering Physician Domi Rivas MD                 Physician           MD  Procedure Type of Study   TTE procedure:ECHOCARDIOGRAM COMPLETE 2D W Maddie Headings COLOR. Procedure Date Date: 10/06/2021 Start: 09:30 AM Study Location: Portable Technical Quality: Fair visualization Indications:Chest pain. Patient Status: Routine Height: 70 inches Weight: 238 pounds BSA: 2.25 m2 BMI: 34.15 kg/m2 HR: 62 bpm BP: 136/86 mmHg  Conclusions   Summary  Left ventricular systolic function is normal.  Ejection fraction is visually estimated at 50-55%. Mild left ventricular hypertrophy. Grade I diastolic dysfunction. No evidence of any pericardial effusion. Signature   ------------------------------------------------------------------  Electronically signed by Kristan Wren MD (Interpreting  physician) on 10/06/2021 at 11:30 AM  ------------------------------------------------------------------   Findings   Left Ventricle  Left ventricular systolic function is normal.  Ejection fraction is visually estimated at 50-55%. Mild left ventricular hypertrophy. Grade I diastolic dysfunction. No regional wall motion abnormalites. Left ventricle size is normal.   Left Atrium  Essentially normal left atrium. Right Atrium  Essentially normal right atrium. Right Ventricle  Essentially normal right ventricle. Aortic Valve  Structurally normal aortic valve. Mitral Valve  Structurally normal mitral valve. Tricuspid Valve  Structurally normal tricuspid valve. Pulmonic Valve  The pulmonic valve was not well visualized. Pericardial Effusion  No evidence of any pericardial effusion. Pleural Effusion  No evidence of pleural effusion. Miscellaneous  Aorta was not clearly visualized.   M-Mode/2D Measurements & Calculations   LV Diastolic Dimension:  LV Systolic Dimension:  AO Root Dimension: 3.9 cmLA  5.07 cm                  3.88 cm                 Area: 18.4 cm2  LV FS:23.5 %             LV Volume Diastolic: 72  LV PW Diastolic: 2.77 cm ml  LV PW Systolic: 1.3 cm   LV Volume Systolic: 36  Septum Diastolic: 2.58   ml                      RV Diastolic Dimension:  cm LV EDV/LV EDV Index: 72 2.52 cm  Septum Systolic: 9.13 cm KE/53 K7YP ESV/LV ESV  CO: 5.1 l/min            Index: 36 ml/16 m2  CI: 2.27 l/m*m2          EF Calculated (A4C): 50 LA volume/Index: 50 ml                           %                       /46Q1  LV Area Diastolic: 86.7  EF Calculated (2D):  cm2                      51.8 %  LV Area Systolic: 68.5  cm2                      LV Length: 8.39 cm                            LVOT: 2.4 cm  Doppler Measurements & Calculations   MV Peak E-Wave: 66.1    AV Peak Velocity: 86.8 cm/s  LVOT Peak Velocity:  cm/s                    AV Peak Gradient: 3.01 mmHg  81.2 cm/s  MV Peak A-Wave: 115     AV Mean Velocity: 66.9 cm/s  LVOT Mean Velocity:  cm/s                    AV Mean Gradient: 2 mmHg     60.7 cm/s  MV E/A Ratio: 0.57      AV VTI: 20.2 cm              LVOT Peak Gradient: 3  MV Peak Gradient: 1.75  AV Area (Continuity):4.07    mmHgLVOT Mean Gradient:  mmHg                    cm2                          2 mmHg   MV P1/2t: 55 msec       LVOT VTI: 18.2 cm  MVA by PHT:4 cm2   MV E' Septal Velocity:  4.93 cm/s  MV E' Lateral Velocity:  8.55 cm/s  MV E/E' septal: 13.41  MV E/E' lateral: 7.73      XR CHEST PORTABLE    Result Date: 10/5/2021  EXAMINATION: ONE XRAY VIEW OF THE CHEST 10/5/2021 1:32 pm COMPARISON: Chest radiograph July 24, 2021, August 6, 2020 HISTORY: ORDERING SYSTEM PROVIDED HISTORY: Chest pain TECHNOLOGIST PROVIDED HISTORY: Reason for exam:->Chest pain Reason for Exam: chest pain FINDINGS: The lungs are without acute focal process. There is no effusion or pneumothorax. The cardiomediastinal silhouette is without acute process. The osseous structures are without acute process. No acute pulmonary process.      NM MYOCARDIAL SPECT REST EXERCISE OR RX    Result Date: 10/6/2021  Cardiac Perfusion Imaging   Demographics   Patient Name      Leeann Leventhal D      Date of study        10/06/2021   Date of Birth     1955         Gender               Male Age               73 year(s)         Race                    Patient Number    [id number]         Room Number          [id number]   Visit Number      [id number]          Height               70 inches   Corporate ID      [id number]           Weight               238 pounds   Accession Number  [id number]                                        NM Technologist      Bobbie Brittle  Physician         MD                 Cardiologist         MD   Conclusions   Summary  Medium sized defect of moderate severity which is reversible involving  anterior /apical wall of myocardium. Normal EF 54 % with normal ventricular contractility. Recommendation  Abnormal Stress MPI  Suggest LHC/Graft study   Signatures   ------------------------------------------------------------------  Electronically signed by Roxi Sheehan MD (Interpreting  cardiologist) on 10/06/2021 at 14:50  ------------------------------------------------------------------  Procedure Procedure Type:   Nuclear Stress Test:Pharmacological, STRESS TEST, LEXISCAN, Myocardial  Perfusion Imaging with Pharm, NM MYOCARDIAL SPECT REST EXERCISE OR RX  Indications: CAD and Chest pain. Risk Factors   The patient risk factors include:prior CABG;physical activity, diabetes  mellitus, dyslipidemia and prior MI . Stress Protocols   Resting ECG  Normal sinus rhythm. Resting HR:77 bpm  Resting BP:150/88 mmHg  Stress Protocol:Exercise - Thony  Peak HR:131 bpm                  HR/BP product:11023  Peak BP:150/88 mmHg              Max exrecise: 7 METS  Predicted HR: 155 bpm  % of predicted HR: 85   Exercise duration: 05:11 min  Reason for termination:Completed   Symptoms  No symptoms with stress.    Imaging Protocols   Rest                             Stress Isotope:Sestamibi 99mTc          Isotope: Sestamibi 99mTc  Isotope dose:10.2 mCi            Isotope dose:32.4 mCi  Administration route: I.V. Administration route: I.V. Injection Date:10/06/2021 07:45  Injection Date:10/06/2021 10:40  Scan Date:10/06/2021 08:45       Scan Date:10/06/2021 11:40   Technique:        SPECT          Technique:        Gated                                                     SPECT   Procedure Description   Upon patient arrival, the patient is identified using two identifiers and  the physician order is verified. An IV is established and 8-11mCi of 99mTc  Sestamibi is intravenously injected and followed with 10mL 0.9% Normal  Saline flush. A circulation period of 45 minutes occurs prior to resting  SPECT imaging. After imaging is complete the patient is escorted to the  stress lab. The patient is connected to the ECG and blood pressure is  measured. The patient is assisted onto the treadmill and the appropriate  exercise protocol (Thony or Modified Thony) is selected and the patient  begins to exercise on the treadmill. Once the patient has reached 85% of the  target heart rate then the Nuclear Technologist intravenously injects  22-33mCi of 99mTc Sestamibi and 10mL 0.9% Normal Saline flush. After  completion, recovery, and removal of the IV, the patient rests during the  second circulation period of 45 minutes. Final stress SPECT gated imaging is  performed. The patient may return home or to their room after stress  imaging. The images are processed and final charting is completed and sent  to the appropriate cardiologist for interpretation and reporting. Perfusion Interpretation   Medium sized defect of moderate severity which is reversible involving  anterior /apical wall of myocardium. Normal EF 54 % with normal ventricular contractility.   Imaging Results     Study artifacts     1) Extracardiac activity     2) Diaphragmatic    attenuation     3) Breast attenuation     4) Motion     Summed scores     - Summed stress score: 5     - Summed rest score: 0     - Summed difference score:    5   Rest ejection  Ejection fraction:54 %  EDV :82 ml  ESV :38 ml  Stroke volume :44 ml  Medical History   Accession#:  [id number]  Admission Data Admission date: 10/05/2021 Admission Time: 13:13 Hospital Status: Inpatient.         Electronically signed by ROXANE Gunderson CNP on 10/7/2021 at 9:38 AM

## 2021-10-07 NOTE — DISCHARGE SUMMARY
Discharge Summary    Name:  Alex Viveros /Age/Sex: 1955  (73 y.o. male)   MRN & CSN:  [id number] & [id number] Admission Date/Time: 10/5/2021  1:13 PM   Attending:  No att. providers found Discharging Provider Ben Rogel, [address] Course:   Alex Viveros is a 73 y.o.  male  who presents with <principal problem not specified>    Hospital Course: The patient was initially admitted 10/5/2021 for concerns of chest pain. He reported history of esophageal dysmotility and described his chest pain as globus sensation midesophagus. He has a history of coronary artery disease s/p CABG x4 vessel 2020. On 10/6/2021 he had an abnormal stress test and subsequent left heart cath. Patient was chest pain-free throughout admission. He did describe some ambulatory discomfort in right groin post heart cath. Does not appear to be hematoma or pseudoaneurysm. Present on Admission:   Chest pain   Unstable angina pectoris ([address]     Chest pain: Evaluate for acute coronary syndrome. Troponin elevated 0.073, 0.089              EKG with no acute ischemic changes              NM stress test abnormal               Echocardiogram completed Left ventricular systolic function is normal.  Ejection fraction is visually estimated at 50-55%.  Mild left ventricular hypertrophy.  Grade I diastolic dysfunction. Risk factor modification              Cardiology following. Continue aspirin, statin, carvedilol, losartan. LHC (10/6) revealed patent grafts    Imdur Rx sent    Uncontrolled diabetes mellitus type 2, not on insulin. Last A1c 12.0) 2021              Patient admits to being noncompliant with diet, medications              Does not check his blood glucose at home. Glipizide, Metformin/                As per medication list from [place] patient is supposed to be on Januvia.               Will require close outpatient follow-up     Chronic systolic/diastolic congestive heart failure               Patient appears compensated               Lasix 40 mg twice daily                 Hypertension-Continue carvedilol, losartan     Hyperlipidemia-Continue atorvastatin     Obesity- Body mass index is 34.15 kg/m². Lifestyle modifications needed      The patient expressed appropriate understanding of and agreement with the discharge recommendations, medications, and plan. Consults this admission:  IP CONSULT TO CARDIOLOGY  IP CONSULT TO HOSPITALIST    Discharge Instruction:   Follow up appointments: Cardiology 1 to 2 weeks  Primary care physician:  within 1- 2 weeks    Diet:  diabetic diet   Activity: no lifting, Driving, or Strenuous exercise for 10 days and no driving today  Disposition: Discharged to:   [x]Home, [][facility], []SNF, []Acute Rehab, []Hospice   Condition on discharge: Stable    Discharge Medications: Orin David D   Home Medication Instructions DRV:[id number]    Printed on:10/07/21 1112   Medication Information                      aspirin 325 MG tablet  Take 325 mg by mouth daily             atorvastatin (LIPITOR) 40 MG tablet  Take 1 tablet by mouth daily             carvedilol (COREG) 6.25 MG tablet  Take 1 tablet by mouth 2 times daily             furosemide (LASIX) 40 MG tablet  Take 1 tablet by mouth 2 times daily             glipiZIDE (GLUCOTROL) 10 MG tablet  Take 1 tablet by mouth 2 times daily (before meals) Take 10 mg by mouth 2 times daily (before meals)             isosorbide mononitrate (IMDUR) 60 MG extended release tablet  Take 1 tablet by mouth daily             losartan (COZAAR) 100 MG tablet  Take 100 mg by mouth daily             metFORMIN (GLUCOPHAGE) 500 MG tablet  Take 2 tablets by mouth 2 times daily (with meals)             nitroGLYCERIN (NITROSTAT) 0.4 MG SL tablet  up to max of 3 total doses. If no relief after 1 dose, call 911.              potassium chloride (KLOR-CON M) 20 MEQ extended release tablet  Take 1 tablet by mouth daily             ranolazine (RANEXA) 500 MG extended release tablet  Take 1 tablet by mouth 2 times daily             SITagliptin (JANUVIA) 50 MG tablet  Take 1 tablet by mouth daily             spironolactone (ALDACTONE) 25 MG tablet  Take 1 tablet by mouth daily                 Objective Findings at Discharge:     Vitals:    10/06/21 1836 10/06/21 2101 10/07/21 0517 10/07/21 0935   BP: 107/73 (!) 145/85 119/69 114/78   Pulse: 79 79 63 57   Resp: 17 22 18 18   Temp:  98.2 °F (36.8 °C) 97.7 °F (36.5 °C) 97.7 °F (36.5 °C)   TempSrc:  Oral Oral Oral   SpO2: 94% 95% 93% 94%   Weight:       Height:                  Physical Exam: 10/07/21     GEN -Awake nontoxic appearing male, sitting upright in bed , NAD. obese body habitus. Appears given age. EYES -Pupils are equally round. No scleral erythema, discharge, or conjunctivitis. HENT -MM are moist. Oral pharynx without exudates, no evidence of thrush. NECK -Supple, no apparent thyromegaly or masses. RESP -CTA, no wheezes, rales or rhonchi. Symmetric chest movement while on RA.  C/V -S1/S2 auscultated. RRR without appreciable M/R/G. No JVD or carotid bruits. Peripheral pulses equal bilaterally and palpable. No peripheral edema. GI -Abdomen is soft non distended and without significant tenderness. No masses or guarding. + BS Rectal exam deferred.  -No CVA tenderness. Jay catheter is not present. LYMPH-No palpable cervical lymphadenopathy and no hepatosplenomegaly. No petechiae or ecchymoses. MS -No gross joint deformities. SKIN -Normal coloration, warm, dry. Right groin puncture site appears normal.  TTP medial groin. No evidence of hematoma or pseudoaneurysm  NEURO-Cranial nerves appear grossly intact, normal speech, no lateralizing weakness. PSYC-Awake, alert, oriented x 4. Appropriate affect.     Data:     Laboratory this visit:  Reviewed  Recent Labs     10/05/21  1239 10/06/21  0216   WBC 6.4 7.0   HGB 16.0 15.9   HCT 45.5 45.2    232      Recent Labs     10/05/21  1239 10/06/21  0216   * 135   K 4.2 3.6   CL 98* 103   CO2 21 23   BUN 13 13   CREATININE 0.8* 0.9     Recent Labs     10/05/21  1239   AST 13*   ALT 20   BILITOT 0.4   ALKPHOS 139*       Radiology this visit:  Reviewed. Echocardiogram complete 2D with doppler with color    Result Date: 10/6/2021  Transthoracic Echocardiography Report (TTE)  Demographics   Patient Name       María Elena RIVAS      Date of Study       10/06/2021   Date of Birth      1955         Gender              Male   Age                73 year(s)         Race                   Patient Number     [id number]         Room Number         6975   Visit Number       [id number]   Corporate ID       [id number]   Accession Number   [id number]         Laura Ely RVT   Ordering Physician Quang Smiley MD                 Physician           MD  Procedure Type of Study   TTE procedure:ECHOCARDIOGRAM COMPLETE 2D W DOPPLER W COLOR. Procedure Date Date: 10/06/2021 Start: 09:30 AM Study Location: Portable Technical Quality: Fair visualization Indications:Chest pain. Patient Status: Routine Height: 70 inches Weight: 238 pounds BSA: 2.25 m2 BMI: 34.15 kg/m2 HR: 62 bpm BP: 136/86 mmHg  Conclusions   Summary  Left ventricular systolic function is normal.  Ejection fraction is visually estimated at 50-55%. Mild left ventricular hypertrophy. Grade I diastolic dysfunction. No evidence of any pericardial effusion.    Signature   ------------------------------------------------------------------  Electronically signed by Coreen Greer MD (Interpreting  physician) on 10/06/2021 at 11:30 AM  ------------------------------------------------------------------   Findings   Left Ventricle  Left ventricular systolic function is normal. Ejection fraction is visually estimated at 50-55%. Mild left ventricular hypertrophy. Grade I diastolic dysfunction. No regional wall motion abnormalites. Left ventricle size is normal.   Left Atrium  Essentially normal left atrium. Right Atrium  Essentially normal right atrium. Right Ventricle  Essentially normal right ventricle. Aortic Valve  Structurally normal aortic valve. Mitral Valve  Structurally normal mitral valve. Tricuspid Valve  Structurally normal tricuspid valve. Pulmonic Valve  The pulmonic valve was not well visualized. Pericardial Effusion  No evidence of any pericardial effusion. Pleural Effusion  No evidence of pleural effusion. Miscellaneous  Aorta was not clearly visualized.   M-Mode/2D Measurements & Calculations   LV Diastolic Dimension:  LV Systolic Dimension:  AO Root Dimension: 3.9 cmLA  5.07 cm                  3.88 cm                 Area: 18.4 cm2  LV FS:23.5 %             LV Volume Diastolic: 72  LV PW Diastolic: 1.81 cm ml  LV PW Systolic: 1.3 cm   LV Volume Systolic: 36  Septum Diastolic: 9.26   ml                      RV Diastolic Dimension:  cm                       LV EDV/LV EDV Index: 72 2.52 cm  Septum Systolic: 5.07 cm FH/06 P1GT ESV/LV ESV  CO: 5.1 l/min            Index: 36 ml/16 m2  CI: 2.27 l/m*m2          EF Calculated (A4C): 50 LA volume/Index: 50 ml                           %                       /59Q2  LV Area Diastolic: 50.8  EF Calculated (2D):  cm2                      51.8 %  LV Area Systolic: 36.4  cm2                      LV Length: 8.39 cm                            LVOT: 2.4 cm  Doppler Measurements & Calculations   MV Peak E-Wave: 66.1    AV Peak Velocity: 86.8 cm/s  LVOT Peak Velocity:  cm/s                    AV Peak Gradient: 3.01 mmHg  81.2 cm/s  MV Peak A-Wave: 115     AV Mean Velocity: 66.9 cm/s  LVOT Mean Velocity:  cm/s                    AV Mean Gradient: 2 mmHg     60.7 cm/s  MV E/A Ratio: 0.57      AV VTI: 20.2 cm LVOT Peak Gradient: 3  MV Peak Gradient: 1.75  AV Area (Continuity):4.07    mmHgLVOT Mean Gradient:  mmHg                    cm2                          2 mmHg   MV P1/2t: 55 msec       LVOT VTI: 18.2 cm  MVA by PHT:4 cm2   MV E' Septal Velocity:  4.93 cm/s  MV E' Lateral Velocity:  8.55 cm/s  MV E/E' septal: 13.41  MV E/E' lateral: 7.73      XR CHEST PORTABLE    Result Date: 10/5/2021  EXAMINATION: ONE XRAY VIEW OF THE CHEST 10/5/2021 1:32 pm COMPARISON: Chest radiograph July 24, 2021, August 6, 2020 HISTORY: ORDERING SYSTEM PROVIDED HISTORY: Chest pain TECHNOLOGIST PROVIDED HISTORY: Reason for exam:->Chest pain Reason for Exam: chest pain FINDINGS: The lungs are without acute focal process. There is no effusion or pneumothorax. The cardiomediastinal silhouette is without acute process. The osseous structures are without acute process. No acute pulmonary process. NM MYOCARDIAL SPECT REST EXERCISE OR RX    Result Date: 10/6/2021  Cardiac Perfusion Imaging   Demographics   Patient Name      Bev RIVAS      Date of study        10/06/2021   Date of Birth     1955         Gender               Male   Age               73 year(s)         Race                    Patient Number    [id number]         Room Number          8583   Visit Number      [id number]          Height               70 inches   Corporate ID      [id number]           Weight               238 pounds   Accession Number  [id number]                                        NM Technologist      Emerald Talley  Physician         MD                 Cardiologist         MD   Conclusions   Summary  Medium sized defect of moderate severity which is reversible involving  anterior /apical wall of myocardium.   Normal EF 54 % with normal ventricular contractility. Recommendation  Abnormal Stress MPI  Suggest LHC/Graft study   Signatures   ------------------------------------------------------------------  Electronically signed by Yumiko Ceja MD (Interpreting  cardiologist) on 10/06/2021 at 14:50  ------------------------------------------------------------------  Procedure Procedure Type:   Nuclear Stress Test:Pharmacological, STRESS TEST, LEXISCAN, Myocardial  Perfusion Imaging with Pharm, NM MYOCARDIAL SPECT REST EXERCISE OR RX  Indications: CAD and Chest pain. Risk Factors   The patient risk factors include:prior CABG;physical activity, diabetes  mellitus, dyslipidemia and prior MI . Stress Protocols   Resting ECG  Normal sinus rhythm. Resting HR:77 bpm  Resting BP:150/88 mmHg  Stress Protocol:Exercise - Thony  Peak HR:131 bpm                  HR/BP product:70364  Peak BP:150/88 mmHg              Max exrecise: 7 METS  Predicted HR: 155 bpm  % of predicted HR: 85   Exercise duration: 05:11 min  Reason for termination:Completed   Symptoms  No symptoms with stress. Imaging Protocols   Rest                             Stress   Isotope:Sestamibi 99mTc          Isotope: Sestamibi 99mTc  Isotope dose:10.2 mCi            Isotope dose:32.4 mCi  Administration route: I.V. Administration route: I.V. Injection Date:10/06/2021 07:45  Injection Date:10/06/2021 10:40  Scan Date:10/06/2021 08:45       Scan Date:10/06/2021 11:40   Technique:        SPECT          Technique:        Gated                                                     SPECT   Procedure Description   Upon patient arrival, the patient is identified using two identifiers and  the physician order is verified. An IV is established and 8-11mCi of 99mTc  Sestamibi is intravenously injected and followed with 10mL 0.9% Normal  Saline flush. A circulation period of 45 minutes occurs prior to resting  SPECT imaging.  After imaging is complete the patient is escorted to the stress lab. The patient is connected to the ECG and blood pressure is  measured. The patient is assisted onto the treadmill and the appropriate  exercise protocol (Thony or Modified Thony) is selected and the patient  begins to exercise on the treadmill. Once the patient has reached 85% of the  target heart rate then the Nuclear Technologist intravenously injects  22-33mCi of 99mTc Sestamibi and 10mL 0.9% Normal Saline flush. After  completion, recovery, and removal of the IV, the patient rests during the  second circulation period of 45 minutes. Final stress SPECT gated imaging is  performed. The patient may return home or to their room after stress  imaging. The images are processed and final charting is completed and sent  to the appropriate cardiologist for interpretation and reporting. Perfusion Interpretation   Medium sized defect of moderate severity which is reversible involving  anterior /apical wall of myocardium. Normal EF 54 % with normal ventricular contractility. Imaging Results     Study artifacts     1) Extracardiac activity     2) Diaphragmatic    attenuation     3) Breast attenuation     4) Motion     Summed scores     - Summed stress score: 5     - Summed rest score: 0     - Summed difference score:    5   Rest ejection  Ejection fraction:54 %  EDV :82 ml  ESV :38 ml  Stroke volume :44 ml  Medical History   Accession#:  [id number]  Admission Data Admission date: 10/05/2021 Admission Time: 13:13 Hospital Status: Inpatient.     Discharge Time of < 30 minutes    Electronically signed by Jadine Bence, APRN - CNP on 10/7/2021 at 11:12 AM

## 2021-10-07 NOTE — PROGRESS NOTES
CARDIOLOGY PROGRESS NOTE                                                  Name:  [facility] /Age/Sex: 1955  (73 y.o. male)   MRN & CSN:  [id number] & [id number] Admission Date/Time: 10/5/2021  1:13 PM   Location:  4117/4117-A PCP: ROXANE Watt - CNP         Admit Date:  10/5/2021  Hospital Day: 3      SUBJECTIVE:   Seen patient as follow up as consultation for CP. No chest pain. No shortness of breath  No palpations    Right groin site is free of hematoma, no bleeding, toes pink, no drainage, mobility intact, pulses palpable, wound is healing well. Patient complains of right groin and scrotal pain on ambulation. Describes pain as pulling or tearing possibly be related to Tegaderm, Tegaderm removed. I do not feel that there is a pseudoaneurysm present. TELEMETRY: Sinus       Intake/Output Summary (Last 24 hours) at 10/7/2021 1009  Last data filed at 10/6/2021 1837  Gross per 24 hour   Intake --   Output 450 ml   Net -450 ml       Assessment/Plan:     1. Chest Pain -recurrent, responsive nitroglycerin  2. History of coronary disease s/p CABG 2020  - LIMA LAD/diagonal jump graft. SVG-OM, SVG-PDA. 3. Echocardiogram shows mild LVH, EF 50-55%. LHC yesterday after abnormal stress test showed 4/4 patent grafts. Chest pain has resolved continue with Imdur, Coreg, and aspirin. 4. Hyperlipidemia: Continue statin therapy  5. Essential hypertension: Continue with Aldactone, losartan, Coreg. We will add Imdur. 6. History of mixed heart failure with LVEF around 45 to 50%. Continue with Lasix/Aldactone/Coreg/losartan. Currently close to euvolemia  7. DVT prophylaxis on Lovenox. 8. Patient can be discharged from cardiac standpoint. Follow-up in 1 to 2 weeks with primary cardiologist, no heavy lifting over 10 pounds for 10 days.              Past medical history:    has a past medical history of CAD (coronary artery disease), Diabetes mellitus ([facility] [address] FHx: coronary artery disease, History of echocardiogram, History of exercise stress test, Hyperlipidemia, Hypertension, and S/P CABG x 4. Past surgical history:   has a past surgical history that includes Cholecystectomy; hernia repair; fracture surgery; Anus surgery; Colonoscopy (2008); Colonoscopy (11/29/2016); Endoscopy, colon, diagnostic (11/29/2016); Coronary artery bypass graft (04/09/2020); and Coronary artery bypass graft (N/A, 04/09/2020). Social History:   reports that he quit smoking about 27 years ago. His smoking use included cigarettes. He started smoking about 47 years ago. He smoked 0.50 packs per day. He has never used smokeless tobacco. He reports that he does not drink alcohol and does not use drugs. Family history:  family history includes Cancer in his brother and father; Diabetes in his mother; Heart Disease in his father and mother. OBJECTIVE:     /78   Pulse 57   Temp 97.7 °F (36.5 °C) (Oral)   Resp 18   Ht 5' 10\" (1.778 m)   Wt 238 lb (108 kg)   SpO2 94%   BMI 34.15 kg/m²       Intake/Output Summary (Last 24 hours) at 10/7/2021 1009  Last data filed at 10/6/2021 1837  Gross per 24 hour   Intake --   Output 450 ml   Net -450 ml       Physical Exam:    Constitutional:  Well developed, Well nourished, No acute distress, Non-toxic appearance. HENT:  Normocephalic, Atraumatic, Bilateral external ears normal, Oropharynx moist, No oral exudates, Nose normal. Neck- Normal range of motion, No tenderness, Supple, No stridor. Eyes:  EOMI, Conjunctiva normal, No discharge. Respiratory:  Normal breath sounds, No respiratory distress, No wheezing, No chest tenderness. ,no use of accessory muscles, diaphragm movement is normal  Cardiovascular S1-S2 No Murmurs, added sounds. Normal rate rhythm. No rubs gallops. Carotid pulses and amplitude are normal no bruit noted. Pedal pulses normal femoral pulses normal.  No pedal edema  GI:  Bowel sounds normal, Soft, No tenderness  : No CVA tenderness. Musculoskeletal: No edema, No tenderness, No cyanosis, No clubbing. Back- No tenderness. Right groin site is free of hematoma, no bleeding, toes pink, no drainage, mobility intact, pulses palpable, wound is healing well. Patient complains of right groin and scrotal pain on ambulation. Integument:  Warm, Dry, No erythema, No rash. Lymphatic:  No lymphadenopathy noted. Neurologic:  Alert & oriented x 3, No focal deficits noted.    Psychiatric:  Affect normal, Judgment normal, Mood normal.           MEDICATIONS:     isosorbide mononitrate  60 mg Oral Daily    ranolazine  500 mg Oral BID    insulin regular  10 Units SubCUTAneous Once    sodium chloride flush  5-40 mL IntraVENous 2 times per day    enoxaparin  40 mg SubCUTAneous Nightly    insulin lispro  0-12 Units SubCUTAneous TID WC    insulin lispro  0-6 Units SubCUTAneous Nightly    insulin glargine  10 Units SubCUTAneous Nightly    aspirin  325 mg Oral Daily    carvedilol  6.25 mg Oral BID    furosemide  40 mg Oral BID    losartan  100 mg Oral Daily    spironolactone  25 mg Oral Daily    atorvastatin  40 mg Oral Daily      sodium chloride      dextrose       sodium chloride flush, sodium chloride, ondansetron **OR** ondansetron, acetaminophen **OR** acetaminophen, polyethylene glycol, glucose, dextrose, glucagon (rDNA), dextrose  Allergies   Allergen Reactions    Lisinopril      Other reaction(s): Cough    Niacin      Other reaction(s): Flushing    Vicodin [Hydrocodone-Acetaminophen] Other (See Comments)     Dislikes the feeling it gives him    Percocet [Oxycodone-Acetaminophen] Nausea And Vomiting       Lab Data:  CBC:   Recent Labs     10/05/21  1239 10/06/21  0216   WBC 6.4 7.0   HGB 16.0 15.9   HCT 45.5 45.2   MCV 91.0 90.9    232     BMP:   Recent Labs     10/05/21  1239 10/06/21  0216   * 135   K 4.2 3.6   CL 98* 103   CO2 21 23   BUN 13 13   CREATININE 0.8* 0.9     LIVER PROFILE:   Recent Labs     10/05/21  1238 10/05/21  1239   AST  --  13*   ALT  --  20   LIPASE 34  --    BILITOT  --  0.4   ALKPHOS  --  139*        ROXANE Kearney - CNP, MD 10/7/2021 10:09 AM           CARDIOLOGY ATTENDING ADDENDUM    I have seen, spoken to and examined this patient personally, independently of the nurse practitioner. I have reviewed the hospital care given to date and reviewed all pertinent labs and imaging. The plan was developed mutually at the time of the visit with the patient,  NP   and myself. I have spoken with patient, nursing staff and provided written and verbal instructions . The above note has been reviewed and I agree with the assessment, diagnosis, and treatment plan with changes made by me as follows       HPI:  I have reviewed the above HPI  And agree with above   Please review addendum/changes made to note above     Interval history:            Physical Exam:  General:  Awake, alert, NAD  Head:normal  Eye:normal  Neck:  No JVD   Chest:  Clear to auscultation, respiration easy  Cardiovascular:  s1s2  Abdomen:   nontender  Extremities:  0 edema  Pulses; palpable  Neuro: grossly normal      MEDICAL DECISION MAKING;    I agree with the above plan, which was planned by myself and discussed with NP.     [facility]  Doing well  Cont med mgt  Can be d/c home     Dr. Wilner Hoffman MD

## 2021-10-07 NOTE — PROGRESS NOTES
Physician Progress Note      PATIENT:               Oswaldo Pearson  CSN #:                  [id number]  :                       1955  ADMIT DATE:       10/5/2021 1:13 PM  Shayne Almonte DATE:        10/7/2021 1:46 PM  RESPONDING  PROVIDER #:        Josué Harris CNP          QUERY TEXT:    Hospitalists,    Patient admitted with chest pain and noted to have abnormal stress test and   LHC was done. If possible, please document in progress notes and discharge   summary the suspected cause of the chest pain:    The medical record reflects the following:  Risk Factors: CAD  Clinical Indicators: Per Cardiology documentation: Chest Pain -recurrent,   responsive nitroglycerin, abnormal stress test, CABG patent / per LHC, GERD,  Treatment: labs, imaging, EKG, ECHO, LHC. nitro    Thank you,  Pepe Rios RN CDS  [phone]  Options provided:  -- Chest pain due to CAD with unstable angina  -- Chest pain due to NSTEMI  -- Chest pain due to GERD  -- Chest pain due to ##please specify, please specify. -- Other - I will add my own diagnosis  -- Disagree - Not applicable / Not valid  -- Disagree - Clinically unable to determine / Unknown  -- Refer to Clinical Documentation Reviewer    PROVIDER RESPONSE TEXT:    This patient has CAD with unstable angina.     Query created by: Vincenzo Lamb on 10/7/2021 11:14 AM      Electronically signed by:  Josué Harris CNP 10/7/2021 2:27 PM

## 2021-10-07 NOTE — PROGRESS NOTES
Went over AVS with Bill. Questions answered. IV removed. Pt called his wife who will pick him up at the front door. Hernan Carmen denied kuldip needs or help at this time. Hernan Carmen verbalized understanding he will let staff know when he is ready to be taken to the front door in a wheel chair.

## 2021-10-07 NOTE — PLAN OF CARE
Problem: Daily Care:  Goal: Daily care needs are met  Description: Daily care needs are met  Outcome: Ongoing     Problem: Pain:  Goal: Patient's pain/discomfort is manageable  Description: Patient's pain/discomfort is manageable  Outcome: Ongoing     Problem: Discharge Planning:  Goal: Patients continuum of care needs are met  Description: Patients continuum of care needs are met  Outcome: Ongoing

## 2022-03-31 RX ORDER — LOSARTAN POTASSIUM 100 MG/1
100 TABLET ORAL DAILY
Qty: 30 TABLET | Refills: 0 | Status: SHIPPED | OUTPATIENT
Start: 2022-03-31 | End: 2022-06-10 | Stop reason: SDUPTHER

## 2022-03-31 NOTE — TELEPHONE ENCOUNTER
Tom Figueroa Requesting Refill    losartan (COZAAR) 100 MG tablet       [address] [id number] [facility] [phone]     Last Office Visit  -  8/25/21 isidra  Next Office Visit  -  none

## 2022-04-25 ENCOUNTER — TELEPHONE (OUTPATIENT)
Dept: FAMILY MEDICINE CLINIC | Age: 67
End: 2022-04-25

## 2022-04-25 NOTE — TELEPHONE ENCOUNTER
----- Message from April Nagle sent at 4/25/2022  1:41 PM EDT -----  Subject: Message to Provider    QUESTIONS  Information for Provider? Patient's wife Prabha Phelps wants to make a follow up   appointment for patient, in regards to his medications. Patient has some   concerns about his medication. Patient's old provider is still empaneled   in his chart, so I was unable to schedule patient for a follow up. Please   call wife back to advise of potential follow up appointment for patient. Patient prefers morning appointments, and any day but Wednesday.   ---------------------------------------------------------------------------  --------------  CALL BACK INFO  What is the best way for the office to contact you? OK to leave message on   voicemail  Preferred Call Back Phone Number? [phone]  ---------------------------------------------------------------------------  --------------  SCRIPT ANSWERS  Relationship to Patient? Other  Representative Name? Marielle Brown  Is the Representative on the appropriate HIPAA document in Epic?  Yes

## 2022-06-10 RX ORDER — CARVEDILOL 6.25 MG/1
TABLET ORAL
Qty: 180 TABLET | Refills: 0 | Status: SHIPPED | OUTPATIENT
Start: 2022-06-10

## 2022-06-10 RX ORDER — GLIPIZIDE 10 MG/1
TABLET ORAL
Qty: 180 TABLET | Refills: 0 | Status: SHIPPED | OUTPATIENT
Start: 2022-06-10

## 2022-06-10 RX ORDER — SPIRONOLACTONE 25 MG/1
TABLET ORAL
Qty: 90 TABLET | Refills: 0 | Status: SHIPPED | OUTPATIENT
Start: 2022-06-10

## 2022-06-10 RX ORDER — ATORVASTATIN CALCIUM 40 MG/1
TABLET, FILM COATED ORAL
Qty: 90 TABLET | Refills: 0 | Status: SHIPPED | OUTPATIENT
Start: 2022-06-10

## 2022-06-10 RX ORDER — FUROSEMIDE 40 MG/1
TABLET ORAL
Qty: 180 TABLET | Refills: 0 | Status: SHIPPED | OUTPATIENT
Start: 2022-06-10

## 2022-06-10 RX ORDER — LOSARTAN POTASSIUM 100 MG/1
TABLET ORAL
Qty: 30 TABLET | Refills: 0 | OUTPATIENT
Start: 2022-06-10

## 2022-06-10 RX ORDER — LOSARTAN POTASSIUM 100 MG/1
100 TABLET ORAL DAILY
Qty: 90 TABLET | Refills: 0 | Status: SHIPPED | OUTPATIENT
Start: 2022-06-10 | End: 2022-07-11

## 2022-06-10 NOTE — TELEPHONE ENCOUNTER
Last Office Visit  -  8/25/21  Next Office Visit  -  CANCELLED APPT w/ NB 5/26/22    Last Filled  -    Last UDS -    Contract -

## 2022-07-11 RX ORDER — LOSARTAN POTASSIUM 100 MG/1
TABLET ORAL
Qty: 90 TABLET | Refills: 0 | Status: SHIPPED | OUTPATIENT
Start: 2022-07-11

## 2022-11-04 RX ORDER — POTASSIUM CHLORIDE 20 MEQ/1
TABLET, EXTENDED RELEASE ORAL
Qty: 90 TABLET | Refills: 0 | Status: SHIPPED | OUTPATIENT
Start: 2022-11-04

## 2022-12-29 SDOH — HEALTH STABILITY: PHYSICAL HEALTH: ON AVERAGE, HOW MANY DAYS PER WEEK DO YOU ENGAGE IN MODERATE TO STRENUOUS EXERCISE (LIKE A BRISK WALK)?: 0 DAYS

## 2022-12-29 ASSESSMENT — PATIENT HEALTH QUESTIONNAIRE - PHQ9
SUM OF ALL RESPONSES TO PHQ QUESTIONS 1-9: 0
SUM OF ALL RESPONSES TO PHQ9 QUESTIONS 1 & 2: 0
1. LITTLE INTEREST OR PLEASURE IN DOING THINGS: 0
2. FEELING DOWN, DEPRESSED OR HOPELESS: 0
SUM OF ALL RESPONSES TO PHQ QUESTIONS 1-9: 0

## 2022-12-29 ASSESSMENT — LIFESTYLE VARIABLES
HOW MANY STANDARD DRINKS CONTAINING ALCOHOL DO YOU HAVE ON A TYPICAL DAY: PATIENT DOES NOT DRINK
HOW OFTEN DO YOU HAVE A DRINK CONTAINING ALCOHOL: NEVER
HOW OFTEN DO YOU HAVE SIX OR MORE DRINKS ON ONE OCCASION: 1
HOW MANY STANDARD DRINKS CONTAINING ALCOHOL DO YOU HAVE ON A TYPICAL DAY: 0
HOW OFTEN DO YOU HAVE A DRINK CONTAINING ALCOHOL: 1

## 2023-01-05 ENCOUNTER — OFFICE VISIT (OUTPATIENT)
Dept: FAMILY MEDICINE CLINIC | Age: 68
End: 2023-01-05
Payer: MEDICARE

## 2023-01-05 VITALS
SYSTOLIC BLOOD PRESSURE: 138 MMHG | HEIGHT: 69 IN | BODY MASS INDEX: 34.07 KG/M2 | DIASTOLIC BLOOD PRESSURE: 70 MMHG | OXYGEN SATURATION: 97 % | WEIGHT: 230 LBS | HEART RATE: 69 BPM

## 2023-01-05 DIAGNOSIS — Z00.00 INITIAL MEDICARE ANNUAL WELLNESS VISIT: Primary | ICD-10-CM

## 2023-01-05 DIAGNOSIS — I10 PRIMARY HYPERTENSION: ICD-10-CM

## 2023-01-05 DIAGNOSIS — E11.9 TYPE 2 DIABETES MELLITUS WITHOUT COMPLICATION, WITHOUT LONG-TERM CURRENT USE OF INSULIN (HCC): ICD-10-CM

## 2023-01-05 DIAGNOSIS — E78.00 HIGH CHOLESTEROL: ICD-10-CM

## 2023-01-05 PROCEDURE — 3078F DIAST BP <80 MM HG: CPT | Performed by: NURSE PRACTITIONER

## 2023-01-05 PROCEDURE — 3075F SYST BP GE 130 - 139MM HG: CPT | Performed by: NURSE PRACTITIONER

## 2023-01-05 PROCEDURE — 3046F HEMOGLOBIN A1C LEVEL >9.0%: CPT | Performed by: NURSE PRACTITIONER

## 2023-01-05 PROCEDURE — G0438 PPPS, INITIAL VISIT: HCPCS | Performed by: NURSE PRACTITIONER

## 2023-01-05 PROCEDURE — 1123F ACP DISCUSS/DSCN MKR DOCD: CPT | Performed by: NURSE PRACTITIONER

## 2023-01-05 PROCEDURE — G8484 FLU IMMUNIZE NO ADMIN: HCPCS | Performed by: NURSE PRACTITIONER

## 2023-01-05 PROCEDURE — 3017F COLORECTAL CA SCREEN DOC REV: CPT | Performed by: NURSE PRACTITIONER

## 2023-01-05 RX ORDER — ROSUVASTATIN CALCIUM 10 MG/1
10 TABLET, COATED ORAL NIGHTLY
Qty: 30 TABLET | Refills: 2 | Status: SHIPPED | OUTPATIENT
Start: 2023-01-05

## 2023-01-05 NOTE — PROGRESS NOTES
Medicare Annual Wellness Visit    Tabahta Akins is here for Medicare AWV (Issue with meds causing cramping in legs and groaning area.)    Assessment & Plan   Primary hypertension  -     CBC with Auto Differential; Future  -     Comprehensive Metabolic Panel; Future  Type 2 diabetes mellitus without complication, without long-term current use of insulin (HCC)  -     Hemoglobin A1C; Future  -     Diabetic Foot Exam  High cholesterol  -     Lipid Panel; Future    Recommendations for Preventive Services Due: see orders and patient instructions/AVS.  Recommended screening schedule for the next 5-10 years is provided to the patient in written form: see Patient Instructions/AVS.     No follow-ups on file. Subjective   Patient has not been taking any medications- he states one was causing him leg cramps so he stopped all medications. Discussed that it was most likely the lipitor so he could stop that and take all other meds and will change lipitor to crestor       Patient's complete Health Risk Assessment and screening values have been reviewed and are found in Flowsheets. The following problems were reviewed today and where indicated follow up appointments were made and/or referrals ordered.     Positive Risk Factor Screenings with Interventions:                 Weight and Activity:  Physical Activity: Unknown    Days of Exercise per Week: 0 days    Minutes of Exercise per Session: Not on file     On average, how many days per week do you engage in moderate to strenuous exercise (like a brisk walk)?: 0 days  Have you lost any weight without trying in the past 3 months?: No  Body mass index: (!) 34.46  Obesity Interventions:  Eats whatever he wants           Dentist Screen:  Have you seen the dentist within the past year?: (!) No    Intervention:  Advised to schedule with their dentist     Vision Screen:  Do you have difficulty driving, watching TV, or doing any of your daily activities because of your eyesight?: No  Have you had an eye exam within the past year?: (!) No  No results found. Interventions:    readers    Safety:  Do you always fasten your seatbelt when you are in a car?: (!) No  Interventions:  Advised to wear seat belt     Advanced Directives:  Do you have a Living Will?: (!) No    Intervention:  He thinks he might have one              Objective   Vitals:    01/05/23 1005   BP: 138/70   Pulse: 69   SpO2: 97%   Weight: 230 lb (104.3 kg)   Height: 5' 8.5\" (1.74 m)      Body mass index is 34.46 kg/m². General Appearance: alert and oriented to person, place and time, well-developed and well-nourished, in no acute distress, alert and oriented to person, place and time, and well-developed and well nourished  Head: normocephalic and atraumatic  ENT: tympanic membrane, external ear and ear canal normal bilaterally, oropharynx clear and moist with normal mucous membranes and poor dentition  Pulmonary/Chest: clear to auscultation bilaterally- no wheezes, rales or rhonchi, normal air movement, no respiratory distress  Cardiovascular: normal rate, normal S1 and S2, no gallops, and intact distal pulses  Musculoskeletal: normal range of motion, no joint swelling, deformity or tenderness  Neurologic: gait and coordination normal and speech normal       Allergies   Allergen Reactions    Lisinopril      Other reaction(s): Cough    Niacin      Other reaction(s): Flushing    Vicodin [Hydrocodone-Acetaminophen] Other (See Comments)     Dislikes the feeling it gives him    Percocet [Oxycodone-Acetaminophen] Nausea And Vomiting     Prior to Visit Medications    Medication Sig Taking? Authorizing Provider   rosuvastatin (CRESTOR) 10 MG tablet Take 1 tablet by mouth nightly Yes ROXANE Kwon CNP   potassium chloride (KLOR-CON M) 20 MEQ extended release tablet TAKE 1 TABLET EVERY DAY  ROXANE Calderon CNP   metFORMIN (GLUCOPHAGE) 500 MG tablet TAKE 2 TABLETS TWICE DAILY WITH MEALS (LAST REFILL UNTIL SEEN. MUST MAKE APPOINTMENT WITH NEW PROVIDER ASAP)  Napoleon Staton, ROXANE Harris CNP   losartan (COZAAR) 100 MG tablet TAKE 1 TABLET EVERY DAY  Jennifer Ghotra, APRN - CNP   glipiZIDE (GLUCOTROL) 10 MG tablet TAKE 1 TABLET TWICE DAILY BEFORE MEALS  Ana María Groshei, ROXANE - CNP   furosemide (LASIX) 40 MG tablet TAKE 1 TABLET TWICE DAILY  Ana María Groshei, ROXANE - CNP   atorvastatin (LIPITOR) 40 MG tablet TAKE 1 TABLET EVERY DAY  Ana María Grosheim, ROXANE Harris CNP   spironolactone (ALDACTONE) 25 MG tablet TAKE 1 TABLET EVERY DAY  Ana María Groshei, ROXANE - CNP   carvedilol (COREG) 6.25 MG tablet TAKE 1 TABLET TWICE DAILY  Ana María Groshei, APRN - CNP   isosorbide mononitrate (IMDUR) 60 MG extended release tablet Take 1 tablet by mouth daily  Patient not taking: Reported on 1/5/2023  ROXANE Chow CNP   ranolazine (RANEXA) 500 MG extended release tablet Take 1 tablet by mouth 2 times daily  Patient not taking: Reported on 1/5/2023  ROXANE Chow CNP   nitroGLYCERIN (NITROSTAT) 0.4 MG SL tablet up to max of 3 total doses. If no relief after 1 dose, call 911.   Patient not taking: Reported on 1/5/2023  Elisa Raghu, APRN - CNP   aspirin 325 MG tablet Take 325 mg by mouth daily  Historical Provider, MD Long (Including outside providers/suppliers regularly involved in providing care):   Patient Care Team:  ROXANE Velasco CNP as PCP - General (Family Nurse Practitioner)  ROXANE Gant CNP as PCP - [facility] Empaneled Provider     Reviewed and updated this visit:  Tobacco  Allergies  Meds  Problems  Med Hx  Surg Hx  Soc Hx  Fam Hx

## 2023-01-05 NOTE — PATIENT INSTRUCTIONS
Learning About Dental Care for Older Adults  Dental care for older adults: Overview  Dental care for older people is much the same as for younger adults. But older adults do have concerns that younger adults do not. Older adults may have problems with gum disease and decay on the roots of their teeth. They may need missing teeth replaced or broken fillings fixed. Or they may have dentures that need to be cared for. Some older adults may have trouble holding a toothbrush. You can help remind the person you are caring for to brush and floss their teeth or to clean their dentures. In some cases, you may need to do the brushing and other dental care tasks. People who have trouble using their hands or who have dementia may need this extra help. How can you help with dental care? Normal dental care  To keep the teeth and gums healthy:  Brush the teeth with fluoride toothpaste twice a day--in the morning and at night--and floss at least once a day. Plaque can quickly build up on the teeth of older adults. Watch for the signs of gum disease. These signs include gums that bleed after brushing or after eating hard foods, such as apples. See a dentist regularly. Many experts recommend checkups every 6 months. Keep the dentist up to date on any new medications the person is taking. Encourage a balanced diet that includes whole grains, vegetables, and fruits, and that is low in saturated fat and sodium. Encourage the person you're caring for not to use tobacco products. They can affect dental and general health. Many older adults have a fixed income and feel that they can't afford dental care. But most [facility] and [facility] have programs in which dentists help older adults by lowering fees. Contact your area's public health offices or  for information about dental care in your area.   Using a toothbrush  Older adults with arthritis sometimes have trouble brushing their teeth because they can't easily hold the toothbrush. Their hands and fingers may be stiff, painful, or weak. If this is the case, you can: Offer an electric toothbrush. Enlarge the handle of a non-electric toothbrush by wrapping a sponge, an elastic bandage, or adhesive tape around it. Push the toothbrush handle through a ball made of rubber or soft foam.  Make the handle longer and thicker by taping Popsicle sticks or tongue depressors to it. You may also be able to buy special toothbrushes, toothpaste dispensers, and floss holders. Your doctor may recommend a soft-bristle toothbrush if the person you care for bleeds easily. Bleeding can happen because of a health problem or from certain medicines. A toothpaste for sensitive teeth may help if the person you care for has sensitive teeth. How do you brush and floss someone's teeth? If the person you are caring for has a hard time cleaning their teeth on their own, you may need to brush and floss their teeth for them. It may be easiest to have the person sit and face away from you, and to sit or stand behind them. That way you can steady their head against your arm as you reach around to floss and brush their teeth. Choose a place that has good lighting and is comfortable for both of you. Before you begin, gather your supplies. You will need gloves, floss, a toothbrush, and a container to hold water if you are not near a sink. Wash and dry your hands well and put on gloves. Start by flossing:  Gently work a piece of floss between each of the teeth toward the gums. A plastic flossing tool may make this easier, and they are available at [facility]. Curve the floss around each tooth into a U-shape and gently slide it under the gum line. Move the floss firmly up and down several times to scrape off the plaque. After you've finished flossing, throw away the used floss and begin brushing:  Wet the brush and apply toothpaste. Place the brush at a 45-degree angle where the teeth meet the gums. Press firmly, and move the brush in small circles over the surface of the teeth. Be careful not to brush too hard. Vigorous brushing can make the gums pull away from the teeth and can scratch the tooth enamel. Brush all surfaces of the teeth, on the tongue side and on the cheek side. Pay special attention to the front teeth and all surfaces of the back teeth. Brush chewing surfaces with short back-and-forth strokes. After you've finished, help the person rinse the remaining toothpaste from their mouth. Where can you learn more? Go to http://www.woods.com/ and enter F944 to learn more about \"Learning About Dental Care for Older Adults. \"  Current as of: June 16, 2022               Content Version: 13.5  © 2006-2022 Healthwise, [employer]. Care instructions adapted under license by [facility] ([facility]). If you have questions about a medical condition or this instruction, always ask your healthcare professional. [address] [address] any warranty or liability for your use of this information. Learning About Vision Tests  What are vision tests? The four most common vision tests are visual acuity tests, refraction, visual field tests, and color vision tests. Visual acuity (sharpness) tests  These tests are used: To see if you need glasses or contact lenses. To monitor an eye problem. To check an eye injury. Visual acuity tests are done as part of routine exams. You may also have this test when you get your 's license or apply for some types of jobs. Visual field tests  These tests are used: To check for vision loss in any area of your range of vision. To screen for certain eye diseases. To look for nerve damage after a stroke, head injury, or other problem that could reduce blood flow to the brain. Refraction and color tests  A refraction test is done to find the right prescription for glasses and contact lenses.   A color vision test is done to check for color blindness. Color vision is often tested as part of a routine exam. You may also have this test when you apply for a job where recognizing different colors is important, such as , electronics, or the [employer]. How are vision tests done? Visual acuity test   You cover one eye at a time. You read aloud from a wall chart across the room. You read aloud from a small card that you hold in your hand. Refraction   You look into a special device. The device puts lenses of different strengths in front of each eye to see how strong your glasses or contact lenses need to be. Visual field tests   Your doctor may have you look through special machines. Or your doctor may simply have you stare straight ahead while they move a finger into and out of your field of vision. Color vision test   You look at pieces of printed test patterns in various colors. You say what number or symbol you see. Your doctor may have you trace the number or symbol using a pointer. How do these tests feel? There is very little chance of having a problem from this test. If dilating drops are used for a vision test, they may make the eyes sting and cause a medicine taste in the mouth. Follow-up care is a key part of your treatment and safety. Be sure to make and go to all appointments, and call your doctor if you are having problems. It's also a good idea to know your test results and keep a list of the medicines you take. Where can you learn more? Go to http://www.cross.com/ and enter G551 to learn more about \"Learning About Vision Tests. \"  Current as of: October 12, 2022               Content Version: 13.5  © 2286-7407 Healthwise, [employer]. Care instructions adapted under license by [facility] ([facility]). If you have questions about a medical condition or this instruction, always ask your healthcare professional. [address] [address] any warranty or liability for your use of this information. Advance Directives: Care Instructions  Overview  An advance directive is a legal way to state your wishes at the end of your life. It tells your family and your doctor what to do if you can't say what you want. There are two main types of advance directives. You can change them any time your wishes change. Living will. This form tells your family and your doctor your wishes about life support and other treatment. The form is also called a declaration. Medical power of . This form lets you name a person to make treatment decisions for you when you can't speak for yourself. This person is called a health care agent (health care proxy, health care surrogate). The form is also called a durable power of  for health care. If you do not have an advance directive, decisions about your medical care may be made by a family member, or by a doctor or a  who doesn't know you. It may help to think of an advance directive as a gift to the people who care for you. If you have one, they won't have to make tough decisions by themselves. For more information, including forms for your state, see the [address] website (www.caringinfo.org/planning/advance-directives/). Follow-up care is a key part of your treatment and safety. Be sure to make and go to all appointments, and call your doctor if you are having problems. It's also a good idea to know your test results and keep a list of the medicines you take. What should you include in an advance directive? Many states have a unique advance directive form. (It may ask you to address specific issues.) Or you might use a universal form that's approved by many states. If your form doesn't tell you what to address, it may be hard to know what to include in your advance directive. Use the questions below to help you get started. Who do you want to make decisions about your medical care if you are not able to?   What life-support measures do you want if you have a serious illness that gets worse over time or can't be cured? What are you most afraid of that might happen? (Maybe you're afraid of having pain, losing your independence, or being kept alive by machines.)  Where would you prefer to die? (Your home? A hospital? A nursing home?)  Do you want to donate your organs when you die? Do you want certain Cheondoism practices performed before you die? When should you call for help? Be sure to contact your doctor if you have any questions. Where can you learn more? Go to http://www.cross.com/ and enter R264 to learn more about \"Advance Directives: Care Instructions. \"  Current as of: June 16, 2022               Content Version: 13.5  © 9837-8542 Healthwise, Incorporated. Care instructions adapted under license by [facility] ([facility]). If you have questions about a medical condition or this instruction, always ask your healthcare professional. [address] [address] any warranty or liability for your use of this information. Personalized Preventive Plan for Karsten Hoffmann - 1/5/2023  Medicare offers a range of preventive health benefits. Some of the tests and screenings are paid in full while other may be subject to a deductible, co-insurance, and/or copay. Some of these benefits include a comprehensive review of your medical history including lifestyle, illnesses that may run in your family, and various assessments and screenings as appropriate. After reviewing your medical record and screening and assessments performed today your provider may have ordered immunizations, labs, imaging, and/or referrals for you. A list of these orders (if applicable) as well as your Preventive Care list are included within your After Visit Summary for your review.     Other Preventive Recommendations:    A preventive eye exam performed by an eye specialist is recommended every 1-2 years to screen for glaucoma; cataracts, macular degeneration, and other eye disorders. A preventive dental visit is recommended every 6 months. Try to get at least 150 minutes of exercise per week or 10,000 steps per day on a pedometer . Order or download the FREE \"Exercise & Physical Activity: Your Everyday Guide\" from [employer]. Call 6-126.183.6273 or search [employer] online. You need 7833-7652 mg of calcium and 9279-1641 IU of vitamin D per day. It is possible to meet your calcium requirement with diet alone, but a vitamin D supplement is usually necessary to meet this goal.  When exposed to the sun, use a sunscreen that protects against both UVA and UVB radiation with an SPF of 30 or greater. Reapply every 2 to 3 hours or after sweating, drying off with a towel, or swimming. Always wear a seat belt when traveling in a car. Always wear a helmet when riding a bicycle or motorcycle.

## 2023-01-10 RX ORDER — RANOLAZINE 500 MG/1
500 TABLET, EXTENDED RELEASE ORAL 2 TIMES DAILY
Qty: 180 TABLET | Refills: 3 | Status: ON HOLD | OUTPATIENT
Start: 2023-01-10 | End: 2023-01-14 | Stop reason: HOSPADM

## 2023-01-13 ENCOUNTER — HOSPITAL ENCOUNTER (OUTPATIENT)
Age: 68
Setting detail: OBSERVATION
Discharge: HOME | End: 2023-01-14
Attending: EMERGENCY MEDICINE | Admitting: INTERNAL MEDICINE
Payer: OTHER GOVERNMENT

## 2023-01-13 ENCOUNTER — APPOINTMENT (OUTPATIENT)
Dept: GENERAL RADIOLOGY | Age: 68
End: 2023-01-13
Payer: OTHER GOVERNMENT

## 2023-01-13 DIAGNOSIS — R47.81 SLURRED SPEECH: ICD-10-CM

## 2023-01-13 DIAGNOSIS — R06.02 SHORTNESS OF BREATH: ICD-10-CM

## 2023-01-13 DIAGNOSIS — R07.9 CHEST PAIN, UNSPECIFIED TYPE: Primary | ICD-10-CM

## 2023-01-13 DIAGNOSIS — R41.89 DECREASED RESPONSIVENESS: ICD-10-CM

## 2023-01-13 LAB
ALBUMIN SERPL-MCNC: 3.4 GM/DL (ref 3.4–5)
ALP BLD-CCNC: 128 IU/L (ref 40–129)
ALT SERPL-CCNC: 16 U/L (ref 10–40)
ANION GAP SERPL CALCULATED.3IONS-SCNC: 11 MMOL/L (ref 4–16)
APTT: 23.6 SECONDS (ref 25.1–37.1)
AST SERPL-CCNC: 14 IU/L (ref 15–37)
BASOPHILS ABSOLUTE: 0 K/CU MM
BASOPHILS RELATIVE PERCENT: 0.3 % (ref 0–1)
BILIRUB SERPL-MCNC: 0.4 MG/DL (ref 0–1)
BILIRUBIN DIRECT: 0.2 MG/DL (ref 0–0.3)
BILIRUBIN, INDIRECT: 0.2 MG/DL (ref 0–0.7)
BUN BLDV-MCNC: 11 MG/DL (ref 6–23)
CALCIUM SERPL-MCNC: 8.5 MG/DL (ref 8.3–10.6)
CHLORIDE BLD-SCNC: 102 MMOL/L (ref 99–110)
CO2: 23 MMOL/L (ref 21–32)
CREAT SERPL-MCNC: 0.9 MG/DL (ref 0.9–1.3)
D DIMER: 311 NG/ML(DDU)
DIFFERENTIAL TYPE: ABNORMAL
EOSINOPHILS ABSOLUTE: 0.1 K/CU MM
EOSINOPHILS RELATIVE PERCENT: 1.2 % (ref 0–3)
GFR SERPL CREATININE-BSD FRML MDRD: >60 ML/MIN/1.73M2
GLUCOSE BLD-MCNC: 219 MG/DL (ref 70–99)
HCT VFR BLD CALC: 45.1 % (ref 42–52)
HEMOGLOBIN: 15.9 GM/DL (ref 13.5–18)
IMMATURE NEUTROPHIL %: 0.2 % (ref 0–0.43)
INR BLD: 0.98 INDEX
LIPASE: 17 IU/L (ref 13–60)
LYMPHOCYTES ABSOLUTE: 3 K/CU MM
LYMPHOCYTES RELATIVE PERCENT: 31.6 % (ref 24–44)
MAGNESIUM: 1.9 MG/DL (ref 1.8–2.4)
MCH RBC QN AUTO: 31.3 PG (ref 27–31)
MCHC RBC AUTO-ENTMCNC: 35.3 % (ref 32–36)
MCV RBC AUTO: 88.8 FL (ref 78–100)
MONOCYTES ABSOLUTE: 0.8 K/CU MM
MONOCYTES RELATIVE PERCENT: 8.5 % (ref 0–4)
NUCLEATED RBC %: 0 %
PDW BLD-RTO: 12.1 % (ref 11.7–14.9)
PLATELET # BLD: 262 K/CU MM (ref 140–440)
PMV BLD AUTO: 9.6 FL (ref 7.5–11.1)
POTASSIUM SERPL-SCNC: 3.6 MMOL/L (ref 3.5–5.1)
PRO-BNP: 85.02 PG/ML
PROTHROMBIN TIME: 12.6 SECONDS (ref 11.7–14.5)
RBC # BLD: 5.08 M/CU MM (ref 4.6–6.2)
SEGMENTED NEUTROPHILS ABSOLUTE COUNT: 5.4 K/CU MM
SEGMENTED NEUTROPHILS RELATIVE PERCENT: 58.2 % (ref 36–66)
SODIUM BLD-SCNC: 136 MMOL/L (ref 135–145)
TOTAL IMMATURE NEUTOROPHIL: 0.02 K/CU MM
TOTAL NUCLEATED RBC: 0 K/CU MM
TOTAL PROTEIN: 6.1 GM/DL (ref 6.4–8.2)
TROPONIN T: <0.01 NG/ML
WBC # BLD: 9.4 K/CU MM (ref 4–10.5)

## 2023-01-13 PROCEDURE — 85379 FIBRIN DEGRADATION QUANT: CPT

## 2023-01-13 PROCEDURE — 85610 PROTHROMBIN TIME: CPT

## 2023-01-13 PROCEDURE — 83690 ASSAY OF LIPASE: CPT

## 2023-01-13 PROCEDURE — 83880 ASSAY OF NATRIURETIC PEPTIDE: CPT

## 2023-01-13 PROCEDURE — 99285 EMERGENCY DEPT VISIT HI MDM: CPT

## 2023-01-13 PROCEDURE — 83735 ASSAY OF MAGNESIUM: CPT

## 2023-01-13 PROCEDURE — 93005 ELECTROCARDIOGRAM TRACING: CPT | Performed by: EMERGENCY MEDICINE

## 2023-01-13 PROCEDURE — 85025 COMPLETE CBC W/AUTO DIFF WBC: CPT

## 2023-01-13 PROCEDURE — 87635 SARS-COV-2 COVID-19 AMP PRB: CPT

## 2023-01-13 PROCEDURE — 82248 BILIRUBIN DIRECT: CPT

## 2023-01-13 PROCEDURE — 71045 X-RAY EXAM CHEST 1 VIEW: CPT

## 2023-01-13 PROCEDURE — 85730 THROMBOPLASTIN TIME PARTIAL: CPT

## 2023-01-13 PROCEDURE — 80053 COMPREHEN METABOLIC PANEL: CPT

## 2023-01-13 PROCEDURE — 84484 ASSAY OF TROPONIN QUANT: CPT

## 2023-01-14 ENCOUNTER — APPOINTMENT (OUTPATIENT)
Dept: MRI IMAGING | Age: 68
End: 2023-01-14
Payer: OTHER GOVERNMENT

## 2023-01-14 ENCOUNTER — APPOINTMENT (OUTPATIENT)
Dept: CT IMAGING | Age: 68
End: 2023-01-14
Payer: OTHER GOVERNMENT

## 2023-01-14 VITALS
DIASTOLIC BLOOD PRESSURE: 71 MMHG | OXYGEN SATURATION: 98 % | RESPIRATION RATE: 18 BRPM | BODY MASS INDEX: 33.55 KG/M2 | TEMPERATURE: 97.7 F | HEART RATE: 80 BPM | HEIGHT: 69 IN | WEIGHT: 226.5 LBS | SYSTOLIC BLOOD PRESSURE: 125 MMHG

## 2023-01-14 PROBLEM — R29.90 STROKE-LIKE SYMPTOMS: Status: ACTIVE | Noted: 2023-01-14

## 2023-01-14 LAB
CHOLESTEROL: 80 MG/DL
ESTIMATED AVERAGE GLUCOSE: 260 MG/DL
GLUCOSE BLD-MCNC: 140 MG/DL (ref 70–99)
GLUCOSE BLD-MCNC: 240 MG/DL (ref 70–99)
HBA1C MFR BLD: 10.7 % (ref 4.2–6.3)
HDLC SERPL-MCNC: 24 MG/DL
LDL CHOLESTEROL CALCULATED: 36 MG/DL
SARS-COV-2, NAAT: NOT DETECTED
SOURCE: NORMAL
TRIGL SERPL-MCNC: 100 MG/DL
TROPONIN T: <0.01 NG/ML

## 2023-01-14 PROCEDURE — 70551 MRI BRAIN STEM W/O DYE: CPT

## 2023-01-14 PROCEDURE — 80061 LIPID PANEL: CPT

## 2023-01-14 PROCEDURE — 99255 IP/OBS CONSLTJ NEW/EST HI 80: CPT | Performed by: INTERNAL MEDICINE

## 2023-01-14 PROCEDURE — 84484 ASSAY OF TROPONIN QUANT: CPT

## 2023-01-14 PROCEDURE — 97165 OT EVAL LOW COMPLEX 30 MIN: CPT

## 2023-01-14 PROCEDURE — 92610 EVALUATE SWALLOWING FUNCTION: CPT | Performed by: SPEECH-LANGUAGE PATHOLOGIST

## 2023-01-14 PROCEDURE — 96372 THER/PROPH/DIAG INJ SC/IM: CPT

## 2023-01-14 PROCEDURE — G0378 HOSPITAL OBSERVATION PER HR: HCPCS

## 2023-01-14 PROCEDURE — 82962 GLUCOSE BLOOD TEST: CPT

## 2023-01-14 PROCEDURE — 6370000000 HC RX 637 (ALT 250 FOR IP): Performed by: INTERNAL MEDICINE

## 2023-01-14 PROCEDURE — 97161 PT EVAL LOW COMPLEX 20 MIN: CPT

## 2023-01-14 PROCEDURE — 70450 CT HEAD/BRAIN W/O DYE: CPT

## 2023-01-14 PROCEDURE — 83036 HEMOGLOBIN GLYCOSYLATED A1C: CPT

## 2023-01-14 PROCEDURE — 6360000002 HC RX W HCPCS: Performed by: INTERNAL MEDICINE

## 2023-01-14 PROCEDURE — 94761 N-INVAS EAR/PLS OXIMETRY MLT: CPT

## 2023-01-14 PROCEDURE — 36415 COLL VENOUS BLD VENIPUNCTURE: CPT

## 2023-01-14 RX ORDER — ENOXAPARIN SODIUM 100 MG/ML
30 INJECTION SUBCUTANEOUS 2 TIMES DAILY
Status: DISCONTINUED | OUTPATIENT
Start: 2023-01-14 | End: 2023-01-14 | Stop reason: HOSPADM

## 2023-01-14 RX ORDER — RANOLAZINE 500 MG/1
500 TABLET, EXTENDED RELEASE ORAL 2 TIMES DAILY
Status: DISCONTINUED | OUTPATIENT
Start: 2023-01-14 | End: 2023-01-14 | Stop reason: HOSPADM

## 2023-01-14 RX ORDER — ONDANSETRON 4 MG/1
4 TABLET, ORALLY DISINTEGRATING ORAL EVERY 8 HOURS PRN
Status: DISCONTINUED | OUTPATIENT
Start: 2023-01-14 | End: 2023-01-14 | Stop reason: HOSPADM

## 2023-01-14 RX ORDER — FUROSEMIDE 40 MG/1
40 TABLET ORAL 2 TIMES DAILY
Status: DISCONTINUED | OUTPATIENT
Start: 2023-01-14 | End: 2023-01-14 | Stop reason: HOSPADM

## 2023-01-14 RX ORDER — DEXTROSE MONOHYDRATE 100 MG/ML
INJECTION, SOLUTION INTRAVENOUS CONTINUOUS PRN
Status: DISCONTINUED | OUTPATIENT
Start: 2023-01-14 | End: 2023-01-14 | Stop reason: HOSPADM

## 2023-01-14 RX ORDER — LOSARTAN POTASSIUM 100 MG/1
100 TABLET ORAL DAILY
Status: DISCONTINUED | OUTPATIENT
Start: 2023-01-14 | End: 2023-01-14 | Stop reason: HOSPADM

## 2023-01-14 RX ORDER — ATORVASTATIN CALCIUM 40 MG/1
40 TABLET, FILM COATED ORAL NIGHTLY
Status: DISCONTINUED | OUTPATIENT
Start: 2023-01-14 | End: 2023-01-14 | Stop reason: HOSPADM

## 2023-01-14 RX ORDER — POTASSIUM CHLORIDE 20 MEQ/1
20 TABLET, EXTENDED RELEASE ORAL DAILY
Status: DISCONTINUED | OUTPATIENT
Start: 2023-01-14 | End: 2023-01-14 | Stop reason: HOSPADM

## 2023-01-14 RX ORDER — NITROGLYCERIN 0.4 MG/1
TABLET SUBLINGUAL
Qty: 25 TABLET | Refills: 3 | Status: SHIPPED
Start: 2023-01-14

## 2023-01-14 RX ORDER — ROSUVASTATIN CALCIUM 10 MG/1
10 TABLET, COATED ORAL NIGHTLY
Qty: 30 TABLET | Refills: 2 | Status: SHIPPED
Start: 2023-01-14

## 2023-01-14 RX ORDER — ISOSORBIDE MONONITRATE 60 MG/1
60 TABLET, EXTENDED RELEASE ORAL DAILY
Status: DISCONTINUED | OUTPATIENT
Start: 2023-01-14 | End: 2023-01-14 | Stop reason: HOSPADM

## 2023-01-14 RX ORDER — ONDANSETRON 2 MG/ML
4 INJECTION INTRAMUSCULAR; INTRAVENOUS EVERY 6 HOURS PRN
Status: DISCONTINUED | OUTPATIENT
Start: 2023-01-14 | End: 2023-01-14 | Stop reason: HOSPADM

## 2023-01-14 RX ORDER — CARVEDILOL 6.25 MG/1
6.25 TABLET ORAL 2 TIMES DAILY WITH MEALS
Status: DISCONTINUED | OUTPATIENT
Start: 2023-01-14 | End: 2023-01-14 | Stop reason: HOSPADM

## 2023-01-14 RX ORDER — ASPIRIN 300 MG/1
300 SUPPOSITORY RECTAL DAILY
Status: DISCONTINUED | OUTPATIENT
Start: 2023-01-14 | End: 2023-01-14 | Stop reason: HOSPADM

## 2023-01-14 RX ORDER — ASPIRIN 81 MG/1
81 TABLET ORAL DAILY
Status: DISCONTINUED | OUTPATIENT
Start: 2023-01-14 | End: 2023-01-14 | Stop reason: HOSPADM

## 2023-01-14 RX ORDER — INSULIN LISPRO 100 [IU]/ML
0-8 INJECTION, SOLUTION INTRAVENOUS; SUBCUTANEOUS
Status: DISCONTINUED | OUTPATIENT
Start: 2023-01-14 | End: 2023-01-14 | Stop reason: HOSPADM

## 2023-01-14 RX ORDER — INSULIN LISPRO 100 [IU]/ML
0-4 INJECTION, SOLUTION INTRAVENOUS; SUBCUTANEOUS NIGHTLY
Status: DISCONTINUED | OUTPATIENT
Start: 2023-01-14 | End: 2023-01-14 | Stop reason: HOSPADM

## 2023-01-14 RX ORDER — POLYETHYLENE GLYCOL 3350 17 G/17G
17 POWDER, FOR SOLUTION ORAL DAILY PRN
Status: DISCONTINUED | OUTPATIENT
Start: 2023-01-14 | End: 2023-01-14 | Stop reason: HOSPADM

## 2023-01-14 RX ORDER — SPIRONOLACTONE 50 MG/1
25 TABLET, FILM COATED ORAL DAILY
Status: DISCONTINUED | OUTPATIENT
Start: 2023-01-14 | End: 2023-01-14 | Stop reason: HOSPADM

## 2023-01-14 RX ADMIN — SPIRONOLACTONE 25 MG: 50 TABLET ORAL at 09:59

## 2023-01-14 RX ADMIN — FUROSEMIDE 40 MG: 40 TABLET ORAL at 09:58

## 2023-01-14 RX ADMIN — LOSARTAN POTASSIUM 100 MG: 100 TABLET, FILM COATED ORAL at 09:58

## 2023-01-14 RX ADMIN — ENOXAPARIN SODIUM 30 MG: 100 INJECTION SUBCUTANEOUS at 09:59

## 2023-01-14 RX ADMIN — RANOLAZINE 500 MG: 500 TABLET, EXTENDED RELEASE ORAL at 09:58

## 2023-01-14 RX ADMIN — ISOSORBIDE MONONITRATE 60 MG: 60 TABLET, EXTENDED RELEASE ORAL at 09:58

## 2023-01-14 RX ADMIN — CARVEDILOL 6.25 MG: 6.25 TABLET, FILM COATED ORAL at 09:59

## 2023-01-14 RX ADMIN — ASPIRIN 81 MG: 81 TABLET, COATED ORAL at 09:59

## 2023-01-14 RX ADMIN — POTASSIUM CHLORIDE 20 MEQ: 1500 TABLET, EXTENDED RELEASE ORAL at 09:58

## 2023-01-14 NOTE — CONSULTS
[address] [facility] ACUTE CARE PHYSICAL THERAPY EVALUATION  Josh Taylor, 1955, 3015/3015-A, 1/14/2023    History  [place]:  The primary encounter diagnosis was Chest pain, unspecified type. Diagnoses of Shortness of breath, Slurred speech, and Decreased responsiveness were also pertinent to this visit. Patient  has a past medical history of Blindness of left eye, CAD (coronary artery disease), Diabetes mellitus ([address] FHx: coronary artery disease, History of echocardiogram, History of exercise stress test, Hyperlipidemia, Hypertension, and S/P CABG x 4. Patient  has a past surgical history that includes Cholecystectomy; hernia repair; fracture surgery; Anus surgery; Colonoscopy (2008); Colonoscopy (11/29/2016); Endoscopy, colon, diagnostic (11/29/2016); Coronary artery bypass graft (04/09/2020); and Coronary artery bypass graft (N/A, 04/09/2020). Subjective:  Patient states:  \"What a nice stroll around the block. \"    Pain:  denies pain.     Communication with other providers:  Handoff to RN, OT  Restrictions: general precautions    Home Setup/Prior level of function   Lives With: Spouse  Type of Home: House  Home Layout: One level  Home Access: Stairs to enter without rails  Entrance Stairs - Number of Steps: 1  Bathroom Shower/Tub: Tub/Shower unit  Bathroom Toilet: Standard  Bathroom Equipment: Grab bars in shower, Shower chair (does not use shower chair)  Home Equipment: [address] [facility], Wheelchair-electric, 4 wheeled walker  ADL Assistance: Independent  Homemaking Assistance: Independent  Ambulation Assistance: Independent (no AD)  Transfer Assistance: Independent  Active : Yes  Occupation: Part time employment  Type of occupation: [employer]   Leisure: Golfing     Examination of body systems (includes body structures/functions, activity/participation limitations):  Observation:  pt supine in bed upon arrival and agreeable to therapy  [facility]  Hearing:  [facility]  Cardiopulmonary:  no O2 needs  Cognition: WFL, see OT/SLP note for further evaluation. Musculoskeletal  ROM R/L:  WFL. Strength R/L:  5/5, no impairment in function and endurance. Neuro:  WFL, coordination intact, strength equal bilaterally, denies n/t      Mobility:  Rolling L/R:  mod I  Supine to sit:  mod I  Sit to supine: mod I  Transfers: pt completed STS to/from EOB independently  Sitting balance:  good. Standing balance:  good. Gait: pt ambulated 400' without a device SBA with decreased edy but no LOB throughout    [facility] 6 Clicks Inpatient Mobility:  AM-PAC Inpatient Mobility Raw Score : 22    Safety: patient left supine in bed with alarm on (denied chair), call light within reach, RN notified, gait belt used. Assessment:  Pt is a 78 y.o. male admitted to the hospital for stroke-like symptoms. Pt is typically independent with all ambulation and transfers without a device. Pt is currently performing bed mobility mod I, transferring mod I, and ambulating 400' without a device SBA. Pt is functioning close to baseline and does not require any further acute care needs. Pt will be discharged from caseload. Complexity: Low    Prognosis: Good, no significant barriers to participation at this time.      Equipment: none, pt does not require an AD      Treatment plan:  Bed mobility, transfers, balance, gait, TA, TX    Recommendations for NURSING mobility: amb with gait belt    Time:   Time in: 0842  Time out: 0851  Timed treatment minutes: 0  Total time: 9    Electronically signed by:    Racquel Prasad PT  1/14/2023, 1:21 PM

## 2023-01-14 NOTE — ED PROVIDER NOTES
CHIEF COMPLAINT    Chief Complaint   Patient presents with    Chest Pain     Tightness       Dizziness           Shortness of Breath     SARAHI Forrester is a 78 y.o. male with history of coronary artery with a CABG, hypertension, hyperlipidemia, diabetes who presents to the ED with complaints of an episode of lightheadedness, chest tightness, and shortness of breath that occurred this evening. Patient states he has unfortunate been experiencing some diarrhea over the last 4 to 5 days but had otherwise felt well. Tonight while at work he had an episode of feeling lightheaded described as feeling that he was unsteady on feet and might pass out. He also had some mild chest pressure and shortness of breath associated with this. He therefore presented to the emergency department for further evaluation. His symptoms were exacerbated with some positional changes earlier. Nothing seemed to make it better. He rates his chest pressure as mild in severity without radiation. Shortness of breath is also mild and exacerbated with exertion. He follows with Dr. Ellyn Geiger of cardiology. Denies fevers, chills, cough, nausea, vomiting. REVIEW OF SYSTEMS  Constitutional: No fever, chills or recent illness. Eye: No visual changes  HENT: No earache or sore throat. Resp: Complains of shortness of breath. No cough  Cardio: Complains of chest pressure  GI: No abdominal pain, nausea, vomiting, constipation or diarrhea. No melena. : No dysuria, urgency or frequency. Endocrine: No heat intolerance, no cold intolerance, no polydipsia   Lymphatics: No adenopathy  Musculoskeletal: No new muscle aches or joint pain. Neuro: No headaches. Psych: No homicidal or suicidal thoughts  Skin: No rash, No itching. ?  ?   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 Appropriate hemodynamic response to exercise is noted.     Hyperlipidemia     Hypertension     S/P CABG x 4 2020     FAMILY HISTORY  Family History   Problem Relation Age of Onset    Diabetes Mother     Heart Disease Mother     Heart Disease Father     Cancer Father     Cancer Brother      SOCIAL HISTORY  Social History     Socioeconomic History    Marital status:      Spouse name: None    Number of children: None    Years of education: None    Highest education level: None   Tobacco Use    Smoking status: Former     Packs/day: 0.50     Types: Cigarettes     Start date: 1974     Quit date: 1994     Years since quittin.6    Smokeless tobacco: Never   Vaping Use    Vaping Use: Never used   Substance and Sexual Activity    Alcohol use: No     Comment: caffeine 6-7 20oz pops a day    Drug use: No     Social Determinants of Health     Physical Activity: Unknown    Days of Exercise per Week: 0 days       SURGICAL HISTORY  Past Surgical History:   Procedure Laterality Date    ANUS SURGERY      fisure    CHOLECYSTECTOMY      COLONOSCOPY      COLONOSCOPY  2016    polyps x9, int hem    CORONARY ARTERY BYPASS GRAFT  04/09/2020    x4 SVG-LAD, OM, PDA &  LIMA-DIAG    CORONARY ARTERY BYPASS GRAFT N/A 2020    CABG CORONARY ARTERY BYPASS x4 WITH LIMA, INTRAOP DEVEN, ENDOHARVEST OF THE LEFT SAPHENOUS VEIN performed by Karma Mancia MD at [address] [place] [address], [place], DIAGNOSTIC  2016    mild reflux esophagitis, non obstructive esophageal ring, hiatal hernia, mild gastritis    FRACTURE SURGERY      left arm    HERNIA REPAIR       CURRENT MEDICATIONS  Current Discharge Medication List        CONTINUE these medications which have NOT CHANGED    Details   ranolazine (RANEXA) 500 MG extended release tablet Take 1 tablet by mouth 2 times daily  Qty: 180 tablet, Refills: 3      rosuvastatin (CRESTOR) 10 MG tablet Take 1 tablet by mouth nightly  Qty: 30 tablet, Refills: 2    Comments: Stop lipitor      potassium chloride (KLOR-CON M) 20 MEQ extended release tablet TAKE 1 TABLET EVERY DAY  Qty: 90 tablet, Refills: 0      metFORMIN (GLUCOPHAGE) 500 MG tablet TAKE 2 TABLETS TWICE DAILY WITH MEALS (LAST REFILL UNTIL SEEN. MUST MAKE APPOINTMENT WITH NEW PROVIDER ASAP)  Qty: 360 tablet, Refills: 0      losartan (COZAAR) 100 MG tablet TAKE 1 TABLET EVERY DAY  Qty: 90 tablet, Refills: 0    Comments: LAST REFILL UNTIL SEEN**PT MUST MAKE APPT WITH NEW PROVIDER ASAP      glipiZIDE (GLUCOTROL) 10 MG tablet TAKE 1 TABLET TWICE DAILY BEFORE MEALS  Qty: 180 tablet, Refills: 0    Comments: LAST REFILL UNTIL SEEN**PT MUST CONTACT OFFICE TO SCHEDULE APPT ASAP      furosemide (LASIX) 40 MG tablet TAKE 1 TABLET TWICE DAILY  Qty: 180 tablet, Refills: 0    Comments: LAST REFILL UNTIL SEEN**PT MUST CONTACT OFFICE TO SCHEDULE APPT ASAP      atorvastatin (LIPITOR) 40 MG tablet TAKE 1 TABLET EVERY DAY  Qty: 90 tablet, Refills: 0    Comments: LAST REFILL UNTIL SEEN**PT MUST CONTACT OFFICE TO SCHEDULE APPT ASAP      spironolactone (ALDACTONE) 25 MG tablet TAKE 1 TABLET EVERY DAY  Qty: 90 tablet, Refills: 0    Comments: LAST REFILL UNTIL SEEN**PT MUST CONTACT OFFICE TO SCHEDULE APPT ASAP      carvedilol (COREG) 6.25 MG tablet TAKE 1 TABLET TWICE DAILY  Qty: 180 tablet, Refills: 0    Comments: LAST REFILL UNTIL SEEN**PT MUST CONTACT OFFICE TO SCHEDULE APPT ASAP      isosorbide mononitrate (IMDUR) 60 MG extended release tablet Take 1 tablet by mouth daily  Qty: 30 tablet, Refills: 3      nitroGLYCERIN (NITROSTAT) 0.4 MG SL tablet up to max of 3 total doses.  If no relief after 1 dose, call 911. Qty: 25 tablet, Refills: 3      aspirin 325 MG tablet Take 325 mg by mouth daily           ALLERGIES  Allergies   Allergen Reactions    Lisinopril      Other reaction(s): Cough    Niacin      Other reaction(s): Flushing    Vicodin [Hydrocodone-Acetaminophen] Other (See Comments)     Dislikes the feeling it gives him    Percocet [Oxycodone-Acetaminophen] Nausea And Vomiting       Nursing notes reviewed by myself for past medical history, family history, social history, surgical history, current medications, and allergies. PHYSICAL EXAM  VITAL SIGNS: Triage VS:    ED Triage Vitals [01/13/23 2155]   Enc Vitals Group      BP (!) 140/79      Heart Rate 80      Resp 19      Temp 98.7 °F (37.1 °C)      Temp Source Oral      SpO2 99 %      Weight 224 lb (101.6 kg)      Height 5' 8\" (1.727 m)      Head Circumference       Peak Flow       Pain Score       Pain Loc       Pain Edu? Excl. in [address]? Constitutional: Well developed, Well nourished, nontoxic appearing  HENT: Normocephalic, Atraumatic, Bilateral external ears normal, Oropharynx moist, No oral exudates, Nose normal.   Eyes: PERRL, EOMI, Conjunctiva normal, No discharge. No scleral icterus. Neck: Normal range of motion, No tenderness, Supple. Lymphatic: No lymphadenopathy noted. Cardiovascular: Normal heart rate, Normal rhythm, No murmurs, gallops or rubs. Thorax & Lungs: Slight diminished breath sounds bilaterally without definitive wheezing, rhonchi, or rales  Abdomen: Soft, No tenderness, No masses, No pulsatile masses, No distention, Normal bowel sounds  Skin: Warm, Dry, Pink, No mottling, No erythema, No rash. Back: No tenderness, No CVA tenderness. Extremities: No edema, No tenderness, No cyanosis, Normal perfusion, No clubbing. Musculoskeletal: Good range of motion in all major joints as observed. No major deformities noted.    Neurologic: Alert & oriented x 3, Normal motor function, Normal sensory function, CN II-XII grossly intact as tested, No focal deficits noted. Negative test of skew. Normal heel-to-shin testing. Psychiatric: Affect normal, Judgment normal, Mood normal.   EKG  Per my interpretation demonstrates sinus rhythm with PACs at a rate of 80 bpm.  Normal axis. Right bundle branch block present with some ST segment depressions in leads V1 through V3. This appears similar compared to EKG from July 2021. No acute ST segment changes  RADIOLOGY  Labs Reviewed   CBC WITH AUTO DIFFERENTIAL - Abnormal; Notable for the following components:       Result Value    MCH 31.3 (*)     Monocytes % 8.5 (*)     All other components within normal limits   BASIC METABOLIC PANEL - Abnormal; Notable for the following components:    Glucose 219 (*)     All other components within normal limits   HEPATIC FUNCTION PANEL - Abnormal; Notable for the following components:    AST 14 (*)     Total Protein 6.1 (*)     All other components within normal limits   D-DIMER, QUANTITATIVE - Abnormal; Notable for the following components:    D-Dimer, Quant 311 (*)     All other components within normal limits   APTT - Abnormal; Notable for the following components:    aPTT 23.6 (*)     All other components within normal limits   COVID-19, RAPID   LIPASE   MAGNESIUM   TROPONIN   BRAIN NATRIURETIC PEPTIDE   PROTIME-INR   TROPONIN   HEMOGLOBIN A1C   LIPID PANEL     I personally reviewed the images. The radiologist's interpretation reveals:  Last Imaging results   CT HEAD WO CONTRAST   Final Result   No acute intracranial abnormality. XR CHEST PORTABLE   Final Result   Stable appearing chest without acute cardiopulmonary process.          MRI brain without contrast    (Results Pending)       MEDS GIVEN IN ED:  Medications   ondansetron (ZOFRAN-ODT) disintegrating tablet 4 mg (has no administration in time range)     Or   ondansetron (ZOFRAN) injection 4 mg (has no administration in time range)   polyethylene glycol (GLYCOLAX) packet 17 g (has no administration in time range)   enoxaparin Sodium (LOVENOX) injection 30 mg (has no administration in time range)   aspirin EC tablet 81 mg (has no administration in time range)     Or   aspirin suppository 300 mg (has no administration in time range)   atorvastatin (LIPITOR) tablet 40 mg (has no administration in time range)   carvedilol (COREG) tablet 6.25 mg (has no administration in time range)   furosemide (LASIX) tablet 40 mg (has no administration in time range)   isosorbide mononitrate (IMDUR) extended release tablet 60 mg (has no administration in time range)   losartan (COZAAR) tablet 100 mg (has no administration in time range)   potassium chloride (KLOR-CON M) extended release tablet 20 mEq (has no administration in time range)   ranolazine (RANEXA) extended release tablet 500 mg (has no administration in time range)   spironolactone (ALDACTONE) tablet 25 mg (has no administration in time range)   insulin lispro (HUMALOG) injection vial 0-8 Units (has no administration in time range)   insulin lispro (HUMALOG) injection vial 0-4 Units (has no administration in time range)   glucose chewable tablet 16 g (has no administration in time range)   dextrose bolus 10% 125 mL (has no administration in time range)     Or   dextrose bolus 10% 250 mL (has no administration in time range)   glucagon (rDNA) injection 1 mg (has no administration in time range)   dextrose 10 % infusion (has no administration in time range)     COURSE & MEDICAL DECISION MAKING  This is a 58-year-old male that presents to the emergency department with complaints of an episode of lightheadedness with chest pressure and mild shortness of breath this evening. Initial vital signs are reassuring. He is nontoxic on exam.  His neurological exam is nonfocal with a negative test of skew. He has slight diminished breath sounds bilaterally without wheezing, rhonchi, or rales.   Patient does tell me he has experienced diarrhea over the last 5 days well but his abdomen is soft and nontender. At this time we will obtain CBC, BMP, EKG, troponin, BNP, D-dimer, chest x-ray. Patient's wife later arrived and I spoke to her at bedside at [phone] at which time she informed the patient had slurred speech during this episode when he was trying to speak to her on the phone. Therefore, CT of the head has been ordered. Patient's EKG is without acute ischemic changes and initial troponin level is nonelevated. His D-dimer is slightly elevated although within age-appropriate limits. CT image of the head is without acute intracranial pathology. A repeat troponin was also ordered and remains nonelevated. CBC and BMP are reassuring. COVID-19 testing was also collected given the reports of his persistent diarrhea and generalized illness and was negative. While in the department the patient apparently had an episode with family in the room where he was not responding appropriately for approximately 20 seconds. No noted seizure activity. By the time I arrived in the room the patient is awake and talking at baseline neurological function. However, given his description of chest pressure with lightheadedness and shortness of breath in addition to 2 separate episodes now with 1 being slurred speech and 1 some decreased responsiveness I feel the patient would benefit from hospitalization for further cardiac work-up as well as possible MRI to evaluate for evidence of TIA versus stroke. His case was discussed with Dr. Abdullahi Rivas of hospitalist team who agreed to hospitalize patient for further management. Amount and/or Complexity of Data Reviewed  Clinical lab tests: reviewed  Decide to obtain previous medical records or to obtain history from someone other than the patient: yes       -  Patient seen and evaluated in the emergency department. -  Triage and nursing notes reviewed and incorporated. -  Old chart records reviewed and incorporated.   -  Work-up included: See above      Appropriate PPE utilized as indicated for entire patient encounter? Time of Disposition: See timeline  ? Current Discharge Medication List        FINAL IMPRESSION  1. Chest pain, unspecified type    2. Shortness of breath    3. Slurred speech    4. Decreased responsiveness        Electronically signed by:  [address], DO, 1/14/2023         [address], DO  01/14/23 2615

## 2023-01-14 NOTE — PROGRESS NOTES
Occupational Therapy  [facility] ACUTE CARE OCCUPATIONAL THERAPY EVALUATION    Dav Olson, 1955, [id number]/[id number], 1/14/2023    Discharge Recommendation: Home w/ assist prn      History:  [place]:  The primary encounter diagnosis was Chest pain, unspecified type. Diagnoses of Shortness of breath, Slurred speech, and Decreased responsiveness were also pertinent to this visit. 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 Appropriate hemodynamic response to exercise is noted. Hyperlipidemia     Hypertension     S/P CABG x 4 04/09/2020         Subjective:  Patient states:  \"Time for you to go torture somebody else now\"  Pain:  denies  Communication with other providers: co-eval w/ PT, handoff to RN  Restrictions: General Precautions, Fall Risk    Home Setup/Prior level of function:    Social/Functional History  Lives With: Spouse  Type of Home: House  Home Layout: One level  Home Access: Stairs to enter without rails  Entrance Stairs - Number of Steps: 1  Bathroom Shower/Tub: Tub/Shower unit  Bathroom Toilet: Standard  Bathroom Equipment: Grab bars in shower, Shower chair (does not use shower chair)  Home Equipment: Peña Hammers, Wheelchair-electric, 4 wheeled walker  ADL Assistance: Independent  Homemaking Assistance: Independent  Ambulation Assistance: Independent (no AD)  Transfer Assistance: Independent  Active : Yes  Occupation: Part time employment  Type of occupation: [employer]   Leisure: Golfing Examination:  Observation: Supine in bed upon arrival, agreeable to therapy eval.  Vision: WFL  Hearing: WFL  Vitals: Stable vitals throughout session on room air      Body Systems and functions:  ROM: WFL   Strength: B UE grossly 5/5 across all major joints   Sensation: WFL  Tone: Normal  Coordination: WFL  Perception: WNL      Cognitive and Psychosocial Functioning:  Overall cognitive status: alert and oriented, WFL  Affect: Normal       Functional Mobility:  Bed mobility:  supine <> sitting EOB w/ supervision  Sitting balance:  supervision    Transfers: STS and stand to sit EOB w/ SBA  Standing balance:  SBA static and dynamic w/o AD  Functional Mobility: ambulated long functional household distance w/o AD w/ SBA  Toilet/Shower Transfers: NT        Activities of Daily Living (ADLs):  Feeding: set up A  Grooming: set up A  UB bathing: SBA  LB bathing: SBA  UB dressing: set up A  LB dressing: set up A to don shoes while seated  Toileting: supervision    *ADL determined per observation of functional mobility, balance, activity tolerance, cognition, or actual ADL performance. AM-PAC 6 click short form for inpatient daily activity:   How much help from another person does the patient currently need. .. Unable  Dep A Lot  Max A A Lot   Mod A A Little  Min A A Little   CGA  SBA None   Mod I  Indep  Sup   1. Putting on and taking off regular lower body clothing? [] 1    [] 2   [] 2   [] 3   [] 3   [x] 4      2. Bathing (including washing, rinsing, drying)? [] 1   [] 2   [] 2 [] 3 [x] 3 [] 4   3. Toileting, which includes using toilet, bedpan, or urinal? [] 1    [] 2   [] 2   [] 3   [] 3   [x] 4     4. Putting on and taking off regular upper body clothing? [] 1   [] 2   [] 2   [] 3   [] 3    [x] 4      5. Taking care of personal grooming such as brushing teeth? [] 1   [] 2    [] 2 [] 3    [] 3   [x] 4      6. Eating meals?    [] 1   [] 2   [] 2   [] 3   [] 3   [x] 4        Raw Score:  23     [24=0% impaired(CH), 23=1-19%(CI), 20-22=20-39%(CJ), 15-19=40-59%(CK), 10-14=60-79%(CL), 7-9=80-99%(CM), 6=100%(CN)]      Educated pt on role of OT, therapy POC and functional goals, progression w/ ADLs and transfers, importance of movement and OOB activity, d/c recommendations     Safety Measures: Gait belt used, Left in bed, All needs met  Recommendations for NURSING activity:  Up to chair for all 3 meals and up to bathroom for all toileting needs     Assessment:  Pt is a 78 y o M admitted d/t stroke like symptoms. Pt at baseline is IND for ADLs, IND for high level IADLs, and IND for functional transfers/mobility w/o AD. Pt currently presents at/near baseline w/ ADLs and transfers/mobility. No further acute OT needs at this time. Complexity: Low  Prognosis: Good, no significant barriers to participation at this time.    Occupational Therapy Plan  Times Per Week: d/c       Time:   Time in: 4388  Time out: 0851  Total time: 9  Timed treatment minutes: 0        Electronically signed by:      BRUNA Santillan/ALONDRA  [id number]

## 2023-01-14 NOTE — H&P
History and Physical      Name:  Kanchan Padgett /Age/Sex: 1955  (78 y.o. male)   MRN & CSN:  [id number] & [id number] Encounter Date/Time: 2023 3:21 AM EST   Location:  ED25/ED-25 PCP: Mar[facility] Mendoza [address] [facility]: 2    Assessment and Plan:     #. Strokelike symptoms  -CT head-no acute process  -Continue aspirin, statin  -MRI brain ordered  -2D echo-10/2021-EF 86-31%, grade 1 diastolic dysfunction.  -Neurology consultation  -NIHSS/PT/OT/SLP evaluation. #.  Chest pain, lightheadedness, shortness of breath  -Patient reported improvement in his symptoms currently  -Troponin x2 negative, EKG-changes noted compared to previous EKG  -Consult cardiology. #.  Uncontrolled diabetes mellitus type 2, not on insulin  -AmP3y-59-3/24/2021.  -Patient is on glipizide, Metformin.    -Continue insulin sliding scale with hypoglycemia protocol  -Hold p.o. medications. #. coronary artery disease s/p CABG (2020)  -Continue aspirin, statin, carvedilol, losartan. -LHC-10/2021-no intervention  -Patient on ranolazine, rosuvastatin, losartan, carvedilol, IMN    #. Chronic diastolic congestive heart failure   -Echo-2020-EF 45-50%, repeat echo 10/2021-EF 65-41%, grade 1 diastolic dysfunction  -Patient appears compensated   -Continue Lasix 40 mg twice daily. Patient is also on spironolactone     #. Hypertension  -Continue carvedilol, losartan     #. Hyperlipidemia  -Patient was recently prescribed rosuvastatin     #. obesity with BMI 34.06     Disposition:   Current Living situation: home    Diet Diet order following swallow evaluation   DVT Prophylaxis [x] Lovenox, []  Heparin, [] SCDs, [] Ambulation,  [] Eliquis, [] Xarelto   Code Status FULL   Surrogate Decision Maker/ POA      History from:   EMR, patient.     History of Present Illness:     Chief Complaint: Stroke-like symptoms  Kanchan Padgett is a 78 y.o. male with coronary artery disease s/p CABG (2020), hypertension, hyperlipidemia, uncontrolled diabetes, obesity was brought to ED by coworkers after the patient had an episode of lightheadedness at work. Patient reported that today when he was at work (works at [employer]) he had an episode of sudden onset of lightheadedness, felt flushed, felt pale. His colleagues thought he was out of it, had slurred speech. Patient reported this lasted for few minutes, denied having any weakness in his upper or lower extremities. Patient reports he is feeling better now. Patient denied any chest pain, had shortness of breath. Patient also complained of having diarrhea for the last 5 days which is currently resolved. Had poor appetite in the last few days. As per ED physician who discussed with patient's wife complaint that patient had slurred speech when he called her during the incident. Patient currently denying any headache, visual disturbance, difficulty swallowing, denied any weakness in his upper or lower extremities. Denied any fever, chills, cough, denied any urinary complaints. At presentation patient was noted to have /79, HR 80, RR 19, temp 98.7, saturating 99% on room air. Lab work significant for random glucose 219, troponin x2<0.010, LFTs within normal range, CBC within normal range. Rapid COVID-negative. CT head-no acute process. Chest x-ray-no acute process. Patient did not receive any medications in ER. Review of Systems: Need 10 Elements   10 point review of systems conducted and pertinent positives and negatives as per HPI.     Objective:   No intake or output data in the 24 hours ending 01/14/23 0321   Vitals:   Vitals:    01/14/23 0215 01/14/23 0230 01/14/23 0300 01/14/23 0315   BP: 121/78 130/70 (!) 145/100    Pulse: 64 69 80 74   Resp: 15 16 17 17   Temp:   97.8 °F (36.6 °C)    TempSrc:   Oral    SpO2: 99% 96% 97% 96%   Weight:       Height:           Medications Prior to Admission   Reviewed medications with patient    Prior to Admission medications Medication Sig Start Date End Date Taking? Authorizing Provider   ranolazine (RANEXA) 500 MG extended release tablet Take 1 tablet by mouth 2 times daily 1/10/23   [facility], APRN - CNP   rosuvastatin (CRESTOR) 10 MG tablet Take 1 tablet by mouth nightly 1/5/23   [facility], APRN - CNP   potassium chloride (KLOR-CON M) 20 MEQ extended release tablet TAKE 1 TABLET EVERY DAY 11/4/22   ROXANE Philip CNP   metFORMIN (GLUCOPHAGE) 500 MG tablet TAKE 2 TABLETS TWICE DAILY WITH MEALS (LAST REFILL UNTIL SEEN. MUST MAKE APPOINTMENT WITH NEW PROVIDER ASAP) 11/4/22   ROXANE Philip CNP   losartan (COZAAR) 100 MG tablet TAKE 1 TABLET EVERY DAY 7/11/22   [facility], APRN - CNP   glipiZIDE (GLUCOTROL) 10 MG tablet TAKE 1 TABLET TWICE DAILY BEFORE MEALS 6/10/22   ROXANE Philip CNP   furosemide (LASIX) 40 MG tablet TAKE 1 TABLET TWICE DAILY 6/10/22   ROXANE Philip CNP   atorvastatin (LIPITOR) 40 MG tablet TAKE 1 TABLET EVERY DAY 6/10/22   ROXANE Philip CNP   spironolactone (ALDACTONE) 25 MG tablet TAKE 1 TABLET EVERY DAY 6/10/22   ROXANE Philip CNP   carvedilol (COREG) 6.25 MG tablet TAKE 1 TABLET TWICE DAILY 6/10/22   ROXANE Philip CNP   isosorbide mononitrate (IMDUR) 60 MG extended release tablet Take 1 tablet by mouth daily  Patient not taking: Reported on 1/5/2023 10/7/21   ROXANE Fuentes CNP   nitroGLYCERIN (NITROSTAT) 0.4 MG SL tablet up to max of 3 total doses. If no relief after 1 dose, call 911. Patient not taking: Reported on 1/5/2023 7/25/21   ROXANE Bianchi CNP   aspirin 325 MG tablet Take 325 mg by mouth daily    Historical Provider, MD       Physical Exam: Need 8 Elements   Physical Exam     GEN  -Awake, alert, NAD.   EYES   -PERRL. HENT  -MM are moist.   RESP  -LS CTA equal bilat, no wheezes, rales or rhonchi. Symmetric chest movement. No respiratory distress noted. C/V  -S1/S2 auscultated.  RRR without appreciable M/R/G. No JVD or carotid bruits. Peripheral pulses equal bilaterally and palpable. No peripheral edema. No reproducible chest wall tenderness. GI  -Abdomen is soft, non-distended, no significant tenderness. No masses or guarding. + BS in all quadrants. Rectal exam deferred.   -No CVA tenderness. Jay catheter is not present. MS  -B/L extremities - No gross joint deformities. No swelling, intact sensation symmetrical.   SKIN  -Normal coloration, warm, dry. NEURO  -  NEUROLOGIC EXAM:     Mental Status: A&O to self, location, month and year, NAD, speech clear, language fluent, repetition and naming intact, follows commands appropriately     Cranial Nerve Exam:   CN II-XII:  PERRL, no nystagmus, no gaze paresis, sensation V1-V3 intact b/l, muscles of facial expression symmetric; hearing intact to conversational tone, shoulder elevation symmetric    Motor Exam: Strength 5/5 bilateral upper and lower extremities  Sensation: Intact light touch UE's/LE's b/l  Coordination/Cerebellum: Finger-to-Nose: no dysmetria b/l  Gait and stance: deferred for safety     PSYC  -Awake, alert, oriented x 3. Appropriate affect. Past Medical History:   Reviewed patient's past medical, surgical, social, family history and allergies. 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 Appropriate hemodynamic response to exercise is noted.     Hyperlipidemia     Hypertension     S/P CABG x 4 04/09/2020     PSHX:  has a past surgical history that includes Cholecystectomy; hernia repair; fracture surgery; Anus surgery; Colonoscopy (); Colonoscopy (2016); Endoscopy, colon, diagnostic (2016); Coronary artery bypass graft (2020); and Coronary artery bypass graft (N/A, 2020). Allergies: Allergies   Allergen Reactions    Lisinopril      Other reaction(s): Cough    Niacin      Other reaction(s): Flushing    Vicodin [Hydrocodone-Acetaminophen] Other (See Comments)     Dislikes the feeling it gives him    Percocet [Oxycodone-Acetaminophen] Nausea And Vomiting     Fam HX: family history includes Cancer in his brother and father; Diabetes in his mother; Heart Disease in his father and mother.   Soc HX:   Social History     Socioeconomic History    Marital status:      Spouse name: None    Number of children: None    Years of education: None    Highest education level: None   Tobacco Use    Smoking status: Former     Packs/day: 0.50     Types: Cigarettes     Start date: 1974     Quit date: 1994     Years since quittin.6    Smokeless tobacco: Never   Vaping Use    Vaping Use: Never used   Substance and Sexual Activity    Alcohol use: No     Comment: caffeine 6-7 20oz pops a day    Drug use: No     Social Determinants of Health     Physical Activity: Unknown    Days of Exercise per Week: 0 days       Medications:   Medications:    Infusions:   PRN Meds:     Labs      CBC:   Recent Labs     23  2221   WBC 9.4   HGB 15.9        BMP:    Recent Labs     23  222      K 3.6      CO2 23   BUN 11   CREATININE 0.9   GLUCOSE 219*     Hepatic:   Recent Labs     23  2221   AST 14*   ALT 16   BILITOT 0.4   ALKPHOS 128     Lipids: No results found for: CHOL, HDL, TRIG  Hemoglobin A1C:   Lab Results   Component Value Date/Time    LABA1C 12.0 2021 07:00 AM     TSH: No results found for: TSH  Troponin:   Lab Results   Component Value Date/Time    TROPONINT <0.010 01/14/2023 01:00 AM    TROPONINT <0.010 01/13/2023 10:21 PM    TROPONINT 0.089 10/06/2021 08:07 AM     Lactic Acid: No results for input(s): LACTA in the last 72 hours. BNP:   Recent Labs     01/13/23 2221   PROBNP 85.02     UA:  Lab Results   Component Value Date/Time    NITRU NEGATIVE 04/06/2020 03:36 PM    COLORU STRAW 04/06/2020 03:36 PM    WBCUA NONE SEEN 04/06/2020 03:36 PM    RBCUA NONE SEEN 04/06/2020 03:36 PM    MUCUS RARE 08/07/2013 08:31 PM    TRICHOMONAS NONE SEEN 04/06/2020 03:36 PM    BACTERIA NEGATIVE 04/06/2020 03:36 PM    CLARITYU CLEAR 04/06/2020 03:36 PM    SPECGRAV 1.018 04/06/2020 03:36 PM    LEUKOCYTESUR NEGATIVE 04/06/2020 03:36 PM    UROBILINOGEN NORMAL 04/06/2020 03:36 PM    BILIRUBINUR NEGATIVE 04/06/2020 03:36 PM    BLOODU NEGATIVE 04/06/2020 03:36 PM    KETUA NEGATIVE 04/06/2020 03:36 PM     Urine Cultures: No results found for: Con Cristina  Blood Cultures: No results found for: BC  No results found for: BLOODCULT2  Organism: No results found for: ORG    Imaging/Diagnostics Last 24 Hours   CT HEAD WO CONTRAST    Result Date: 1/14/2023  EXAMINATION: CT OF THE HEAD WITHOUT CONTRAST  1/14/2023 12:40 am TECHNIQUE: CT of the head was performed without the administration of intravenous contrast. Automated exposure control, iterative reconstruction, and/or weight based adjustment of the mA/kV was utilized to reduce the radiation dose to as low as reasonably achievable. COMPARISON: None. HISTORY: ORDERING SYSTEM PROVIDED HISTORY: Transient slurred speech TECHNOLOGIST PROVIDED HISTORY: Reason for exam:->Transient slurred speech Has a \"code stroke\" or \"stroke alert\" been called? ->No Decision Support Exception - unselect if not a suspected or confirmed emergency medical condition->Emergency Medical Condition (MA) Reason for Exam: Transient slurred speech FINDINGS: BRAIN/VENTRICLES: There is no acute intracranial hemorrhage, mass effect or midline shift. No abnormal extra-axial fluid collection.   The gray-white differentiation is maintained without evidence of an acute infarct. There is no evidence of hydrocephalus. ORBITS: The visualized portion of the orbits demonstrate no acute abnormality. SINUSES: The visualized paranasal sinuses and mastoid air cells demonstrate no acute abnormality. SOFT TISSUES/SKULL:  No acute abnormality of the visualized skull or soft tissues. No acute intracranial abnormality. XR CHEST PORTABLE    Result Date: 1/13/2023  EXAMINATION: ONE XRAY VIEW OF THE CHEST 1/13/2023 10:50 pm COMPARISON: 10/05/2021 HISTORY: ORDERING SYSTEM PROVIDED HISTORY: sob TECHNOLOGIST PROVIDED HISTORY: Reason for exam:->sob Reason for Exam: sob FINDINGS: Median sternotomy wires are identified. Cardiac and mediastinal silhouettes appear stable from the previous exam.  Degenerative changes are identified in the shoulders and spine. No focal consolidation or pleural effusion is identified. No pneumothorax is seen. Stable appearing chest without acute cardiopulmonary process. Personally reviewed Lab Studies, Imaging, and discussed case with ED physician.     Electronically signed by Farnaz Olea MD on 1/14/2023 at 3:21 AM

## 2023-01-14 NOTE — PLAN OF CARE
Problem: Discharge Planning  Goal: Discharge to home or other facility with appropriate resources  Outcome: Progressing  Flowsheets (Taken 1/14/2023 0557)  Discharge to home or other facility with appropriate resources: Identify barriers to discharge with patient and caregiver

## 2023-01-14 NOTE — PROGRESS NOTES
Patient discharged to home. AVS given. Patient verbalized an understanding of instructions but stated his wife would need to look at the medications. PIV x 1 removed. Scripts sent electronically. All belongings sent home with patient. Patient taken to main entrance via wheelchair.   Patient already has a prescription for crestor at home with refills per wife, Sofy Akins

## 2023-01-14 NOTE — PROGRESS NOTES
Speech Language Pathology  Facility/Department: [address] [place] [address] BEDSIDE SWALLOW EVALUATION    NAME: Ju Good  : 1955  MRN: [id number]    ADMISSION DATE: 2023  ADMITTING DIAGNOSIS: has Gastroesophageal reflux disease with esophagitis; Esophageal dyskinesia; History of colonic polyps; Gastroesophageal reflux disease without esophagitis; Chest pain; Type 2 diabetes mellitus with circulatory disorder, without long-term current use of insulin ([facility] [address] NSTEMI (non-ST elevated myocardial infarction) ([facility] [address] Coronary artery disease involving native coronary artery of native heart with angina pectoris ([facility] [address] S/P CABG (coronary artery bypass graft); FHx: coronary artery disease; Benign essential hypertension; Hyperlipidemia; Hallux rigidus; Onychomycosis of toenail; Pain of toe of right foot; Pain of toe of left foot; Plantar fasciitis of right foot; Essential (primary) hypertension; Polyp of colon; Unstable angina pectoris ([facility] [address] and Stroke-like symptoms on their problem list.    Impression  Dysphagia Diagnosis: Swallow function appears WFL;No clinical indicators of dysphagia  Dysphagia Outcome Severity Scale: Level 7: Normal in all situations     Ju Good was seen for a clinical bedside swallow evaluation following admission for stroke-like symptoms, chest pain, SOB, decreased responsiveness, and \"slurred\" speech. Pt was seen at bedside, alert and cooperative. He followed complex commands for the oral motor exam which was normal with no focal weakness. Vocal quality and cough strength were normal.  PO trials of regular solids and thins by straw completed with normal bolus acceptance, formation, mastication, a-p transit and clearance. The pharyngeal swallow appeared to be WNL with likely normal swallow timing and laryngeal elevation.  0 s/s aspiration for all trials. Speech and language were screened and judged to be WNL with appropriate command following and responses to questions.   Normal articulation. Recommend:  Regular/Thins. No needs identified at this time. ONSET DATE: 1/13/2023     Recent Chest Xray/CT of Chest:  negative    Date of Eval: 1/14/2023  Evaluating Therapist: TRAN Oseguera    Current Diet level:  Current Diet : Regular    Primary Complaint  Patient Complaint: denies    Pain:  Pain Assessment  Pain Assessment: None - Denies Pain    Reason for Referral  Filomena Ramires was referred for a bedside swallow evaluation to assess the efficiency of his swallow function, identify signs and symptoms of aspiration and make recommendations regarding safe dietary consistencies, effective compensatory strategies, and safe eating environment. Treatment Plan  Requires SLP Intervention: No  Duration of Treatment: n/a  D/C Recommendations: No follow up therapy recommended post discharge     Recommended Diet and Intervention  Recommended Form of Meds: PO  Recommendations: Self feed     Compensatory Swallowing Strategies    Treatment/Goals  Short-term Goals  Timeframe for Short-term Goals: n/a  Long-term Goals  Timeframe for Long-term Goals: n/a    General  Chart Reviewed: Yes  Behavior/Cognition: Alert; Cooperative  Respiratory Status: Room air  Communication Observation: Functional  Follows Directions: Complex  Dentition: Some missing teeth  Patient Positioning: Upright in bed  Baseline Vocal Quality: Normal  Volitional Cough: Strong  Prior Dysphagia History: denies  Consistencies Administered: Regular; Thin - straw    Vision/Hearing  Hearing  Hearing: Within functional limits    Oral Motor Deficits  Oral/Motor  Oral Hygiene: Moist;Clean    Oral Phase Dysfunction  Oral Phase  Oral Phase: WNL     Indicators of Pharyngeal Phase Dysfunction   Pharyngeal Phase   Pharyngeal Phase: WNL    Prognosis  Individuals consulted  Consulted and agree with results and recommendations: Patient    Education  Patient Education: results WNL  Patient Education Response: Demonstrated understanding  Safety Devices in place: Yes  Type of devices: All fall risk precautions in place; Left in bed       Therapy Time  In/Out:  [address] [facility], SLP  1/14/2023 9:21 AM

## 2023-01-14 NOTE — CONSULTS
Neurology Service Consult Note  [address]   Patient Name: Ahmet Mead  : 1955        Subjective:   Reason for consult: Stroke like symptoms  78 y.o. -male with past medical history of CAD s/p CABG, DM2, HTN, HLD, left eye blindness presenting to [place] [address] with complaints of lightheadedness and slurred speech. Patient seen in neurological consult for completion of stroke workup. Patient states that he was at work yesterday when he became pale, \"foggy\" feeling, and felt flushed. Patient states that co-workers and his wife feel that he was having slurred speech, however patient states he did not feel like he was slurring speech. Patient does states that for about 5 days prior to coming to the hospital he had GI symptoms of multiple episodes of diarrhea and significant decreased appetite and had not been eating or drinking well. He denies any vision changes, loss of balance, falls, weakness on one side of the body or numbness/tingling. CT head was completed in the ED that was non-acute. MR head obtained an non-acute. On examination today patient is alert and oriented x4. Able to follow commands appropriately. Denies any new or worsening symptoms. Patient states that he is feeling back to his normal self at this time, states that the symptoms that brought him in only lasted for about 10 minutes. Discussed all imaging with patient at the bedside. No family at bedside during examination.        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 Appropriate hemodynamic response to exercise is noted.     Hyperlipidemia     Hypertension     S/P CABG x 4 04/09/2020    :   Past Surgical History:   Procedure Laterality Date    ANUS SURGERY      fisure    CHOLECYSTECTOMY      COLONOSCOPY  2008    COLONOSCOPY  11/29/2016    polyps x9, int hem    CORONARY ARTERY BYPASS GRAFT  04/09/2020    x4 SVG-LAD, OM, PDA &  LIMA-DIAG    CORONARY ARTERY BYPASS GRAFT N/A 04/09/2020    CABG CORONARY ARTERY BYPASS x4 WITH LIMA, INTRAOP DEVEN, ENDOHARVEST OF THE LEFT SAPHENOUS VEIN performed by Arik Steele MD at [address] [place] [address], COLON, DIAGNOSTIC  11/29/2016    mild reflux esophagitis, non obstructive esophageal ring, hiatal hernia, mild gastritis    FRACTURE SURGERY      left arm    HERNIA REPAIR       Medications:  Scheduled Meds:   enoxaparin  30 mg SubCUTAneous BID    aspirin  81 mg Oral Daily    Or    aspirin  300 mg Rectal Daily    atorvastatin  40 mg Oral Nightly    carvedilol  6.25 mg Oral BID WC    furosemide  40 mg Oral BID    isosorbide mononitrate  60 mg Oral Daily    losartan  100 mg Oral Daily    potassium chloride  20 mEq Oral Daily    ranolazine  500 mg Oral BID    spironolactone  25 mg Oral Daily    insulin lispro  0-8 Units SubCUTAneous TID WC    insulin lispro  0-4 Units SubCUTAneous Nightly     Continuous Infusions:   dextrose       PRN Meds:.ondansetron **OR** ondansetron, polyethylene glycol, glucose, dextrose bolus **OR** dextrose bolus, glucagon (rDNA), dextrose    Allergies   Allergen Reactions    Lisinopril      Other reaction(s): Cough    Niacin      Other reaction(s): Flushing    Vicodin [Hydrocodone-Acetaminophen] Other (See Comments)     Dislikes the feeling it gives him    Percocet [Oxycodone-Acetaminophen] Nausea And Vomiting     Social History     Socioeconomic History    Marital status:      Spouse name: Not on file    Number of children: Not on file    Years of education: Not on file    Highest education level: Not on file   Occupational History    Not on file   Tobacco Use    Smoking status: Former     Packs/day: 0.50     Types: Cigarettes     Start date: 1974     Quit date: 1994     Years since quittin.6    Smokeless tobacco: Never   Vaping Use    Vaping Use: Never used   Substance and Sexual Activity    Alcohol use: No     Comment: caffeine 6-7 20oz pops a day    Drug use: No    Sexual activity: Not on file   Other Topics Concern    Not on file   Social History Narrative    Not on file     Social Determinants of Health     Financial Resource Strain: Not on file   Food Insecurity: Not on file   Transportation Needs: Not on file   Physical Activity: Unknown    Days of Exercise per Week: 0 days    Minutes of Exercise per Session: Not on file   Stress: Not on file   Social Connections: Not on file   Intimate Partner Violence: Not on file   Housing Stability: Not on file      Family History   Problem Relation Age of Onset    Diabetes Mother     Heart Disease Mother     Heart Disease Father     Cancer Father     Cancer Brother          ROS (10 systems)  In addition to that documented in the HPI above, the additional ROS was obtained:  Constitutional: Denies fevers or chills  Eyes: Denies vision changes  ENMT: Denies sore throat  CV: Denies chest pain  Resp: Denies SOB  GI: Denies vomiting or diarrhea  : Denies painful urination  MSK: Denies recent trauma  Skin: Denies new rashes  Neuro: Denies new numbness or tingling or weakness  Endocrine: Denies unexpected weight loss  Heme: Denies bleeding disorders    Physical Exam:       [unfilled]   Wt Readings from Last 3 Encounters:   23 226 lb 8 oz (102.7 kg)   23 226 lb (102.5 kg)   23 230 lb (104.3 kg)     Temp Readings from Last 3 Encounters:   23 97.7 °F (36.5 °C) (Oral)   10/07/21 97.7 °F (36.5 °C) (Oral)   21 98.4 °F (36.9 °C) (Oral)     BP Readings from Last 3 Encounters: 01/14/23 128/82   01/05/23 138/70   10/07/21 114/78     Pulse Readings from Last 3 Encounters:   01/14/23 69   01/05/23 69   10/07/21 57        Gen: A&O x 4, NAD, cooperative  HEENT: NC/AT, EOMI, PERRL, mmm, no carotid bruits, neck supple, no meningeal signs  Heart: RRR  Lungs: CTAB  Ext: no edema, no calf tenderness b/l  Psych: normal mood and affect  Skin: no rashes or lesions    NEUROLOGIC EXAM:    Mental Status: A&O to self, location, month and year, NAD, speech clear, language fluent, repetition and naming intact, follows commands appropriately    Cranial Nerve Exam:   CN II-XII:  PERRL, VFF, no nystagmus, no gaze paresis, sensation V1-V3 intact b/l, muscles of facial expression symmetric; hearing intact to conversational tone, palate elevates symmetrically, shoulder elevation symmetric and tongue protrudes midline with movement side to side. Motor Exam:       Strength 5/5 UE's/LE's b/l  Tone and bulk normal   No pronator drift    Deep Tendon Reflexes: 2/4 biceps, triceps, brachioradialis, patellar, and achilles b/l; flexor plantar responses b/l    Sensation: Intact light touch/pinprick/vibration UE's/LE's b/l    Coordination/Cerebellum:       Tremors--none      Rapidly alternating movements: no dysdiadochokinesia b/l                Heel-to-Shin: no dysmetria b/l      Finger-to-Nose: no dysmetria b/l    Gait and stance:      Gait: deferred      LABS:     Recent Labs     01/13/23  2221   WBC 9.4      K 3.6      CO2 23   BUN 11   CREATININE 0.9   GLUCOSE 219*   INR 0.98   [email or web address]@  [email or web address] TSH Results,[unfilled],     Last Liver Function Results:  @LABRCNTIP(ALT:3,AST:3,BILITOTAL:3,BILIDIR:3,ALKPHOS:3)@          IMAGING:    CT head:   No acute intracranial abnormality. MRI brain w/o contrast:   1. No acute infarct or acute intracranial process identified. 2. Mild chronic small vessel ischemic changes. Above imaging personally reviewed.      ASSESSMENT/PLAN: 78 y.o. -male with past medical history of CAD s/p CABG, DM2, HTN, HLD, left eye bindness presenting to [place] [address] with complaints of lightheadedness and slurred speech. Patient seen in neurological consult for completion of stroke workup. Lightheadedness and slurred speech related to near syncope  CT head as above  MRI head as above; without evidence of stroke. Patient does have multiple risk factors for stroke: HTN, HLD, T2DM, CAD  Continue home secondary stroke prevention: Aspirin and statin medications. PT/OT per their recommendations. Can have patient follow up after discharge in the office for any further testing. Patient is stable for discharge from neurology standpoint. We will sign off at this time. Patient care discussed with attending physician Dr. Jayant Dias. Thank you for allowing us to participate in the care of your patient. If there are any questions regarding evaluation please feel free to contact us. ROXANE Navarrete CNP, 1/14/2023  Neurology NP  I spent >70 minutes total time regarding this patient's encounter. This included obtaining history, performing a neurological medical exam, developing an assessment / plan, and documenting via EMR.    ------------------------------------    Attending Note:  I have rounded on this patient with Martin Gil CNP. I have reviewed the chart and we have discussed this case in detail. The patient was seen and examined by myself. Pertinent labs and imaging have been personally reviewed. Our findings and impressions were discussed with the patient. I concur with the Nurse Practioner's assessment and plan. Symptoms are improving. MRI negative. Feel symptoms most consistent with near syncope.      Josiah Hunter DO 1/15/2023 10:36 AM

## 2023-01-14 NOTE — PROGRESS NOTES
Patient has a normal coca cola at the bedside. He is diabetic with an A1C of 10.7. Educated him on the effects of an A1C that is this high. He states that he eats whatever he wants and can't stand anything that doesn't have real sugar in it. Explained the possibility of damage to the cardiac bypass he recently had, his kidney function, and wound healing because of his high blood sugars. He doesn't monitor his glucose at home at all. Will pass this info along to the MD to see if he wants to consult endocrine or give him additional DM education. Was Levulan/Ameluz Applied On A Previous Day?: No

## 2023-01-14 NOTE — DISCHARGE SUMMARY
Discharge Summary    Name:  Yuri Merchant /Age/Sex: 1955  (78 y.o. male)   MRN & CSN:  [id number] & [id number] Admission Date/Time: 2023  9:52 PM   Attending:  Cassandra Varela MD Discharging Provider ROXANE Gatica - CNP     Hospital Course:   Yuri Merchant is a 78 y.o.  male  who presents with Stroke-like symptoms    Hospital Course: The patient was initially admitted 2020 for concerns of strokelike symptoms or chest pain. Stroke-like symptoms-presents with dysarthria/dizziness. Unresponsive episode while emergency room per family and unwitnessed. Stroke alert not activated while emergency room if symptoms resolved prior to provider re evaluation. LKW unknown                CT head () nonacute              Neuro checks/ NIHSS 0 throughout admission              MRI ()-nonacute              Echo ordered- last TTE (10/6/2021) EF 50 to 55%, mild LVH, G1 DD. Neurology consulted and cleared for discharge              ASA/Statin on medication regimen              Swallow evaluation               PT/OT-home with assistance as needed     CAD s/p CABG x 4 vessel (2020)  Chest pain ACS ruled out. Suspect epigastric in nature given remote hx of esophageal dysmotility . Troponin trend negative. EKG shows no acute ischemic changes. Cardiology consulted and cleared for discharge per nursing recommend outpatient follow-up for stress/echo   Recommended patient schedule appointment with cardiology for follow-up ASAP              Per chart review last cardiology visit to see (10/2021) started Imdur but has not been compliant with medications due to cost.     Antiplatelet/BB/Statin/sl Nitro on medication regimen    HFpEF. Appears euvolemic              CXR nonacute.   BNP 85.02, D-dimer 311              Continue to monitor I/O              Continue Lasix/ Aldactone/BB  TTE noted above     Uncontrolled DMII with chronic complications and without long term use of insulin. A1c 10.7 (1/14/2023)  Continue glipizide  Monitor blood sugars closely at home and bring log to follow-up appointment              Will need outpatient follow-up with PCP for better glycemic control regimen              Discontinuing metformin as patient reports side effects question adherence   Prescribing Jardiance given cardiac comorbidities    Dietary counseling and written instruction provided by nursing    Essential hypertension- continue home antihypertensive regimen-trends appear controlled. Hyperlipidemia lipid panel (1/2023) TC 80  HDL 24 LDL 36   Continue Crestor    Patient reports not knowing current medication list.  Call to spouse and reviewed MAR via phone. Reports taking Crestor, not taking Imdur/Ranexa at all due to insurance coverage. The patient expressed appropriate understanding of and agreement with the discharge recommendations, medications, and plan.      Consults this admission:  IP CONSULT TO NEUROLOGY  IP CONSULT TO CARDIOLOGY    Discharge Instruction:   Follow up appointments: Cardiology schedule appointment ASAP  Neurology  Primary care physician:  within 2 weeks    Diet:  diabetic diet and low fat, low cholesterol diet   Activity: activity as tolerated  Disposition: Discharged to:   []Home, [][facility], []SNF, []Acute Rehab, []Hospice   Condition on discharge: Stable    Discharge Medications:        Medication List        START taking these medications      empagliflozin 10 MG tablet  Commonly known as: Jardiance  Take 1 tablet by mouth daily     rosuvastatin 10 MG tablet  Commonly known as: Crestor  Take 1 tablet by mouth nightly            CONTINUE taking these medications      aspirin 325 MG tablet     carvedilol 6.25 MG tablet  Commonly known as: COREG  TAKE 1 TABLET TWICE DAILY     furosemide 40 MG tablet  Commonly known as: LASIX  TAKE 1 TABLET TWICE DAILY     glipiZIDE 10 MG tablet  Commonly known as: GLUCOTROL  TAKE 1 TABLET TWICE DAILY BEFORE MEALS     losartan 100 MG tablet  Commonly known as: COZAAR  TAKE 1 TABLET EVERY DAY     nitroGLYCERIN 0.4 MG SL tablet  Commonly known as: NITROSTAT  up to max of 3 total doses. If no relief after 1 dose, call 911. potassium chloride 20 MEQ extended release tablet  Commonly known as: KLOR-CON M  TAKE 1 TABLET EVERY DAY     spironolactone 25 MG tablet  Commonly known as: ALDACTONE  TAKE 1 TABLET EVERY DAY            STOP taking these medications      atorvastatin 40 MG tablet  Commonly known as: LIPITOR     isosorbide mononitrate 60 MG extended release tablet  Commonly known as: IMDUR     metFORMIN 500 MG tablet  Commonly known as: GLUCOPHAGE     ranolazine 500 MG extended release tablet  Commonly known as: RANEXA               Where to Get Your Medications        These medications were sent to [address] [place] [address] [phone] - f [phone]  [facility], [address]      Phone: [phone]   empagliflozin 10 MG tablet       Information about where to get these medications is not yet available    Ask your nurse or doctor about these medications  nitroGLYCERIN 0.4 MG SL tablet  rosuvastatin 10 MG tablet         Objective Findings at Discharge:     Vitals:    01/14/23 0715 01/14/23 0958 01/14/23 1131 01/14/23 1326   BP: 125/71 125/71 125/71    Pulse: 72 72 74 80   Resp: 18      Temp:       TempSrc:       SpO2:       Weight:       Height:                  Physical Exam: 01/14/23     GEN -Awake nontoxic appearing male, sitting upright in bed , NAD. obese body habitus. Appears given age. EYES -Pupils are equally round. No scleral erythema, discharge, or conjunctivitis. HENT -MM are moist. Oral pharynx without exudates, no evidence of thrush. NECK -Supple, no apparent thyromegaly or masses. RESP -CTA, no wheezes, rales or rhonchi. Symmetric chest movement while on RA.  C/V -S1/S2 auscultated. RRR without appreciable M/R/G. No JVD or carotid bruits. Peripheral pulses equal bilaterally and palpable. No peripheral edema. GI -Abdomen is soft non distended and without significant tenderness. No masses or guarding. + BS Rectal exam deferred.  -No CVA tenderness. Jay catheter is not present. LYMPH-No palpable cervical lymphadenopathy and no hepatosplenomegaly. No petechiae or ecchymoses. MS -No gross joint deformities. SKIN -Normal coloration, warm, dry. NEURO-Cranial nerves appear grossly intact, normal speech, no lateralizing weakness. PSYC-Awake, alert, oriented x 4. Appropriate affect. Data:     Laboratory this visit:  Reviewed  Recent Labs     01/13/23 2221   WBC 9.4   HGB 15.9   HCT 45.1         Recent Labs     01/13/23 2221      K 3.6      CO2 23   BUN 11   CREATININE 0.9     Recent Labs     01/13/23 2221   AST 14*   ALT 16   BILIDIR 0.2   BILITOT 0.4   ALKPHOS 128     Recent Labs     01/13/23 2221   INR 0.98     Radiology this visit:  Reviewed. CT HEAD WO CONTRAST    Result Date: 1/14/2023  EXAMINATION: CT OF THE HEAD WITHOUT CONTRAST  1/14/2023 12:40 am TECHNIQUE: CT of the head was performed without the administration of intravenous contrast. Automated exposure control, iterative reconstruction, and/or weight based adjustment of the mA/kV was utilized to reduce the radiation dose to as low as reasonably achievable. COMPARISON: None. HISTORY: ORDERING SYSTEM PROVIDED HISTORY: Transient slurred speech TECHNOLOGIST PROVIDED HISTORY: Reason for exam:->Transient slurred speech Has a \"code stroke\" or \"stroke alert\" been called? ->No Decision Support Exception - unselect if not a suspected or confirmed emergency medical condition->Emergency Medical Condition (MA) Reason for Exam: Transient slurred speech FINDINGS: BRAIN/VENTRICLES: There is no acute intracranial hemorrhage, mass effect or midline shift. No abnormal extra-axial fluid collection.   The gray-white differentiation is maintained without evidence of an acute infarct. There is no evidence of hydrocephalus. ORBITS: The visualized portion of the orbits demonstrate no acute abnormality. SINUSES: The visualized paranasal sinuses and mastoid air cells demonstrate no acute abnormality. SOFT TISSUES/SKULL:  No acute abnormality of the visualized skull or soft tissues. No acute intracranial abnormality. XR CHEST PORTABLE    Result Date: 1/13/2023  EXAMINATION: ONE XRAY VIEW OF THE CHEST 1/13/2023 10:50 pm COMPARISON: 10/05/2021 HISTORY: ORDERING SYSTEM PROVIDED HISTORY: sob TECHNOLOGIST PROVIDED HISTORY: Reason for exam:->sob Reason for Exam: sob FINDINGS: Median sternotomy wires are identified. Cardiac and mediastinal silhouettes appear stable from the previous exam.  Degenerative changes are identified in the shoulders and spine. No focal consolidation or pleural effusion is identified. No pneumothorax is seen. Stable appearing chest without acute cardiopulmonary process. MRI brain without contrast    Result Date: 1/14/2023  EXAMINATION: MRI OF THE BRAIN WITHOUT CONTRAST  1/14/2023 10:38 am TECHNIQUE: Multiplanar multisequence MRI of the brain was performed without the administration of intravenous contrast. COMPARISON: CT brain earlier same day HISTORY: ORDERING SYSTEM PROVIDED HISTORY: stroke like symptoms TECHNOLOGIST PROVIDED HISTORY: Reason for exam:->stroke like symptoms Decision Support Exception - unselect if not a suspected or confirmed emergency medical condition->Emergency Medical Condition (MA) Reason for Exam: stroke-like symptoms FINDINGS: INTRACRANIAL STRUCTURES/VENTRICLES: There are no areas of restricted diffusion identified to suggest an acute infarct. There is no acute intracranial hemorrhage. No mass effect or midline shift is present. There is mild abnormal increased T2/FLAIR signal intensity within the periventricular white matter. There is no sellar or suprasellar mass present.   There is no ventriculomegaly or abnormal extra-axial fluid collection present. The proximal portions of the [place] demonstrate normal flow voids. ORBITS: Limited evaluation of the orbits is unremarkable. SINUSES: The paranasal sinuses and mastoid air cells are clear. BONES/SOFT TISSUES: Bone marrow signal intensity is normal.     1. No acute infarct or acute intracranial process identified. 2. Mild chronic small vessel ischemic changes.          Discharge Time of 35 minutes    Electronically signed by ROXANE Amos CNP on 1/14/2023 at 2:36 PM

## 2023-01-14 NOTE — PROGRESS NOTES
Hospitalist Progress Note      Name:  Arash Phan /Age/Sex: 1955  (78 y.o. male)   MRN & CSN:  [id number] & [id number] Admission Date/Time: 2023  9:52 PM   [facility]  [facility] PCP: Darrin Eagle 112 Day: 2                                               Attending Physician Dr Bambi Riggs and Plan:   Arash Phan is a 78 y.o.  male  who presents with Stroke-like symptoms      Stroke-like symptoms-presents with dysarthria/dizziness. Unresponsive episode while emergency room per family and unwitnessed. Stroke alert not activated while emergency room if symptoms resolved prior to provider re evaluation. LKW unknown     CT head () nonacute   Neuro checks/ NIHSS 0   MRI pending   Echo ordered- last TTE (10/6/2021) EF 50 to 55%, mild LVH, G1 DD. Neurology consulted    ASA/Statin on medication regimen   Swallow evaluation    PT/OT    CAD s/p CABG x 4 vessel (2020)  Chest pain r/o ACS. Concern for anginal source given history and continued risk factors. Troponin trend negative. EKG shows no acute ischemic changes. Telemetry monitoring    Cardiology consulted. Follows with Dr. Roberta Merino   Pending labs for further risk stratification    Antiplatelet/BB/Statin/sl Nitro on medication regimen. Continue Ranexa/Imdur    HFpEF. Appears euvolemic   CXR nonacute. BNP 85.02, D-dimer 311   Continue to monitor I/O   Continue Lasix/ Aldactone/BB  Echo noted above pending repeat    Uncontrolled DMII with chronic complications and without long term use of insulin. A1c 10.7 (2023)  Monitor FSBS and cover with medium dose SSI   Hold PO medications while inpatient-only on Glucotrol as monotherapy   Will need outpatient follow-up with PCP for better glycemic control regimen   Will consider starting SGLT2 in setting of cardiac Hx (CHF) comorbid conditions    Essential hypertension- continue home antihypertensive regimen-trends appear controlled. Hyperlipidemia lipid panel (1/2023) TC 80  HDL 24 LDL 36    Diet ADULT DIET; Regular; 4 carb choices (60 gm/meal); No Added Salt (3-4 gm)   DVT Prophylaxis [x] Lovenox, []  Heparin, [] SCDs, [] Ambulation   GI Prophylaxis [x] PPI,  [] H2 Blocker,  [] Carafate,  [] Diet/Tube Feeds   Code Status Full Code   Disposition Patient requires continued admission due to further evaluation of strokelike symptoms     -Patient assessment and plan discussed with supervising physician-  Subjective 1/14/2023     Joseph Lobo is a 78 y.o.  male, who presented with  Chest Pain (Tightness /), Dizziness (/), and Shortness of Breath  . Patient seen examined at bedside. Continue plan per admitting physician. Patient denies current chest pain symptoms. States never had pain but described as musculoskeletal cramping sensation. Denies any dysarthria or unresponsive episode. He basically indicates that family was overreacting based on symptoms. He states \"if they would have just let me rest I would be fine\". Apparently dysarthria/unresponsive episode while emergency room witnessed by family but back to baseline with provider reevaluation. Currently neuro exam with no focal deficits. Does not appear to have dysarthric speech but unknown baseline    Ten point ROS reviewed negative, unless as noted above    Objective: Intake/Output Summary (Last 24 hours) at 1/14/2023 0904  Last data filed at 1/14/2023 0600  Gross per 24 hour   Intake 480 ml   Output --   Net 480 ml      Vitals:   Vitals:    01/14/23 0330 01/14/23 0403 01/14/23 0506 01/14/23 0534   BP: (!) 141/86 (!) 144/95 (!) 167/108 128/82   Pulse: 72 81 70 69   Resp: 15 23 18 18   Temp: 97.8 °F (36.6 °C)   97.7 °F (36.5 °C)   TempSrc:    Oral   SpO2: 96% 96% 96% 98%   Weight:    226 lb 8 oz (102.7 kg)   Height:    5' 8.5\" (1.74 m)     Physical Exam: 01/14/23     GEN -Awake nontoxic appearing male, sitting upright in bed , NAD. normal body habitus.  Appears given age.  Rishabh Darby are equally round. No scleral erythema, discharge, or conjunctivitis. HENT -MM are moist. Oral pharynx without exudates, no evidence of thrush. NECK -Supple, no apparent thyromegaly or masses. RESP -CTA, no wheezes, rales or rhonchi. Symmetric chest movement while on RA.  C/V -S1/S2 auscultated. RRR without appreciable M/R/G. No JVD or carotid bruits. Peripheral pulses equal bilaterally and palpable. No peripheral edema. GI -Abdomen is soft non distended and without significant tenderness. No masses or guarding. + BS Rectal exam deferred.  -No CVA tenderness. Jay catheter is not present. LYMPH-No palpable cervical lymphadenopathy and no hepatosplenomegaly. No petechiae or ecchymoses. MS -No gross joint deformities. SKIN -Normal coloration, warm, dry.   Mental Status Exam:   Alert and orientedx3, follows commands, speech and language intact  NEUROLOGICAL:   Johnny Keys's NIH Stroke Scale at 9:18 AM is:  Level of Consciousness:  0 - alert; keenly responsive    LOC Questions:  0 - answers both questions correctly    LOC Commands:  0 - performs both tasks correctly    Best Gaze:  0 - normal    Visual Fields:  0 - no visual loss    Facial Palsy:  0 - normal symmetric movement    Motor-Arm-Left:  0 - no drift, limb holds 90 (or 45) degrees for full 10 seconds    Motor-Leg-Left:  0 - no drift; leg holds 30 degree position for full 5 seconds    Motor-Arm-Right:  0 - no drift, limb holds 90 (or 45) degrees for full 10 seconds    Motor-Leg-Right:  0 - no drift; leg holds 30 degree position for full 5 seconds    Limb Ataxia:  0 - absent    Sensory:  0 - normal; no sensory loss    Best Language:  0 - no aphasia, normal    Dysarthria:  0 - normal    Extinction and Inattention:  0 - no abnormality  PSYCHIATRIC: Flat affect    Medications:   Medications:    enoxaparin  30 mg SubCUTAneous BID    aspirin  81 mg Oral Daily    Or    aspirin  300 mg Rectal Daily    atorvastatin  40 mg Oral Nightly carvedilol  6.25 mg Oral BID     furosemide  40 mg Oral BID    isosorbide mononitrate  60 mg Oral Daily    losartan  100 mg Oral Daily    potassium chloride  20 mEq Oral Daily    ranolazine  500 mg Oral BID    spironolactone  25 mg Oral Daily    insulin lispro  0-8 Units SubCUTAneous TID     insulin lispro  0-4 Units SubCUTAneous Nightly      Infusions:    dextrose       PRN Meds: ondansetron, 4 mg, Q8H PRN   Or  ondansetron, 4 mg, Q6H PRN  polyethylene glycol, 17 g, Daily PRN  glucose, 4 tablet, PRN  dextrose bolus, 125 mL, PRN   Or  dextrose bolus, 250 mL, PRN  glucagon (rDNA), 1 mg, PRN  dextrose, , Continuous PRN        LABS  CBC   Recent Labs     01/13/23  2221   WBC 9.4   HGB 15.9   HCT 45.1         RENAL  Recent Labs     01/13/23 2221      K 3.6      CO2 23   BUN 11   CREATININE 0.9     LFT'S  Recent Labs     01/13/23 2221   AST 14*   ALT 16   BILIDIR 0.2   BILITOT 0.4   ALKPHOS 128     COAG  Recent Labs     01/13/23 2221   INR 0.98     Radiology this visit:  Reviewed. CT HEAD WO CONTRAST    Result Date: 1/14/2023  EXAMINATION: CT OF THE HEAD WITHOUT CONTRAST  1/14/2023 12:40 am TECHNIQUE: CT of the head was performed without the administration of intravenous contrast. Automated exposure control, iterative reconstruction, and/or weight based adjustment of the mA/kV was utilized to reduce the radiation dose to as low as reasonably achievable. COMPARISON: None. HISTORY: ORDERING SYSTEM PROVIDED HISTORY: Transient slurred speech TECHNOLOGIST PROVIDED HISTORY: Reason for exam:->Transient slurred speech Has a \"code stroke\" or \"stroke alert\" been called? ->No Decision Support Exception - unselect if not a suspected or confirmed emergency medical condition->Emergency Medical Condition (MA) Reason for Exam: Transient slurred speech FINDINGS: BRAIN/VENTRICLES: There is no acute intracranial hemorrhage, mass effect or midline shift. No abnormal extra-axial fluid collection.   The gray-white differentiation is maintained without evidence of an acute infarct. There is no evidence of hydrocephalus. ORBITS: The visualized portion of the orbits demonstrate no acute abnormality. SINUSES: The visualized paranasal sinuses and mastoid air cells demonstrate no acute abnormality. SOFT TISSUES/SKULL:  No acute abnormality of the visualized skull or soft tissues. No acute intracranial abnormality. XR CHEST PORTABLE    Result Date: 1/13/2023  EXAMINATION: ONE XRAY VIEW OF THE CHEST 1/13/2023 10:50 pm COMPARISON: 10/05/2021 HISTORY: ORDERING SYSTEM PROVIDED HISTORY: sob TECHNOLOGIST PROVIDED HISTORY: Reason for exam:->sob Reason for Exam: sob FINDINGS: Median sternotomy wires are identified. Cardiac and mediastinal silhouettes appear stable from the previous exam.  Degenerative changes are identified in the shoulders and spine. No focal consolidation or pleural effusion is identified. No pneumothorax is seen. Stable appearing chest without acute cardiopulmonary process.              Electronically signed by ROXANE Chan CNP on 1/14/2023 at 9:04 AM

## 2023-01-14 NOTE — PROGRESS NOTES
I went to patient's room to see if tech had taken him out for discharge. The wheelchair is in the room but the patient is not there. Assumed he has left on his own and did not tell anyone.

## 2023-01-14 NOTE — CONSULTS
INPATIENT CARDIOLOGY CONSULT NOTE         Reason for consultation:   Apical chest pain    Referring physician:  Enmanuel Kam MD     Primary care physician: Des Vaca, APRN - CNP      Dear Enmanuel Kam MD Thank you for the consult    Chief Complaint   Patient presents with    Chest Pain     Tightness       Dizziness           Shortness of Breath       History of present illness:Marshal is a 78 y. o.year old who  presents with   Chief Complaint   Patient presents with    Chest Pain     Tightness       Dizziness           Shortness of Breath       Patient is a pleasant 32-year-old gentleman who was admitted to the hospital with chief complaint of diarrhea, confusion ? Strokelike symptoms, and chest pain. Cardiology consulted to evaluate patient for chest pain with history of coronary disease s/p CABG with last left heart cardiac authorization in 2021 showing adequate revascularization and patent grafts. CHADWICK LAD, SVG OM, SVG RCA    Patient upon questioning reveals that he had some epigastric tenderness and pain with episodes of diarrhea and nausea. He attributes this to dehydration    Denies any typical anginal symptoms. EKG shows sinus rhythm with right bundle branch block and nonspecific ST-T changes. Prior EKG from last year also reveals right bundle branch block. Cardiac troponin negative        Past medical history:    has a past medical history of Blindness of left eye, CAD (coronary artery disease), Diabetes mellitus ([address] FHx: coronary artery disease, History of echocardiogram, History of exercise stress test, Hyperlipidemia, Hypertension, and S/P CABG x 4. Past surgical history:   has a past surgical history that includes Cholecystectomy; hernia repair; fracture surgery; Anus surgery; Colonoscopy (2008); Colonoscopy (11/29/2016); Endoscopy, colon, diagnostic (11/29/2016); Coronary artery bypass graft (04/09/2020); and Coronary artery bypass graft (N/A, 04/09/2020).   Social History:   reports that he quit smoking about 28 years ago. His smoking use included cigarettes. He started smoking about 49 years ago. He smoked an average of .5 packs per day. He has never used smokeless tobacco. He reports that he does not drink alcohol and does not use drugs.   Family history:   no family history of CAD, STROKE of DM    Allergies   Allergen Reactions    Lisinopril      Other reaction(s): Cough    Niacin      Other reaction(s): Flushing    Vicodin [Hydrocodone-Acetaminophen] Other (See Comments)     Dislikes the feeling it gives him    Percocet [Oxycodone-Acetaminophen] Nausea And Vomiting       ondansetron (ZOFRAN-ODT) disintegrating tablet 4 mg, Q8H PRN   Or  ondansetron (ZOFRAN) injection 4 mg, Q6H PRN  polyethylene glycol (GLYCOLAX) packet 17 g, Daily PRN  enoxaparin Sodium (LOVENOX) injection 30 mg, BID  aspirin EC tablet 81 mg, Daily   Or  aspirin suppository 300 mg, Daily  atorvastatin (LIPITOR) tablet 40 mg, Nightly  carvedilol (COREG) tablet 6.25 mg, BID WC  furosemide (LASIX) tablet 40 mg, BID  isosorbide mononitrate (IMDUR) extended release tablet 60 mg, Daily  losartan (COZAAR) tablet 100 mg, Daily  potassium chloride (KLOR-CON M) extended release tablet 20 mEq, Daily  ranolazine (RANEXA) extended release tablet 500 mg, BID  spironolactone (ALDACTONE) tablet 25 mg, Daily  insulin lispro (HUMALOG) injection vial 0-8 Units, TID WC  insulin lispro (HUMALOG) injection vial 0-4 Units, Nightly  glucose chewable tablet 16 g, PRN  dextrose bolus 10% 125 mL, PRN   Or  dextrose bolus 10% 250 mL, PRN  glucagon (rDNA) injection 1 mg, PRN  dextrose 10 % infusion, Continuous PRN      Current Facility-Administered Medications   Medication Dose Route Frequency Provider Last Rate Last Admin    ondansetron (ZOFRAN-ODT) disintegrating tablet 4 mg  4 mg Oral Q8H PRN Jim Sarmiento MD        Or    ondansetron (ZOFRAN) injection 4 mg  4 mg IntraVENous Q6H PRN Jim Sarmiento MD polyethylene glycol (GLYCOLAX) packet 17 g  17 g Oral Daily PRN Brennon Yoon MD        enoxaparin Sodium (LOVENOX) injection 30 mg  30 mg SubCUTAneous BID Brennon Yoon MD   30 mg at 01/14/23 0959    aspirin EC tablet 81 mg  81 mg Oral Daily Brennon Yoon MD   81 mg at 01/14/23 2227    Or    aspirin suppository 300 mg  300 mg Rectal Daily Brennon Yoon MD        atorvastatin (LIPITOR) tablet 40 mg  40 mg Oral Nightly Mian Trinidad MD        carvedilol (COREG) tablet 6.25 mg  6.25 mg Oral BID  Brennon Yoon MD   6.25 mg at 01/14/23 0959    furosemide (LASIX) tablet 40 mg  40 mg Oral BID Brennon Yoon MD   40 mg at 01/14/23 0958    isosorbide mononitrate (IMDUR) extended release tablet 60 mg  60 mg Oral Daily Brennon Yoon MD   60 mg at 01/14/23 0958    losartan (COZAAR) tablet 100 mg  100 mg Oral Daily Brennon Yoon MD   100 mg at 01/14/23 0958    potassium chloride (KLOR-CON M) extended release tablet 20 mEq  20 mEq Oral Daily Brennon Yoon MD   20 mEq at 01/14/23 0958    ranolazine (RANEXA) extended release tablet 500 mg  500 mg Oral BID Brennon Yoon MD   500 mg at 01/14/23 0837    spironolactone (ALDACTONE) tablet 25 mg  25 mg Oral Daily Brennon Yoon MD   25 mg at 01/14/23 0959    insulin lispro (HUMALOG) injection vial 0-8 Units  0-8 Units SubCUTAneous TID  Mian Trinidad MD        insulin lispro (HUMALOG) injection vial 0-4 Units  0-4 Units SubCUTAneous Nightly Mian Trinidad MD        glucose chewable tablet 16 g  4 tablet Oral PRN Brennon Yoon MD        dextrose bolus 10% 125 mL  125 mL IntraVENous PRN Brennon Yoon MD        Or    dextrose bolus 10% 250 mL  250 mL IntraVENous PRN Brennon Yoon MD        glucagon (rDNA) injection 1 mg  1 mg SubCUTAneous PRN Brennon Yoon MD        dextrose 10 % infusion   IntraVENous Continuous PRN Brennon Yoon MD             Review of Systems:      Constitutional: No Fever or Weight Loss   Eyes: No Decreased Vision  ENT: No Headaches, Hearing Loss or Vertigo  Cardiovascular:   + chest pain,  no dyspnea on exertion,  no palpitations or loss of consciousness  Respiratory: No cough or wheezing    Gastrointestinal: No abdominal pain, appetite loss, blood in stools, constipation, diarrhea or heartburn  Genitourinary: No dysuria, trouble voiding, or hematuria  Musculoskeletal:  No gait disturbance, weakness or joint complaints  Integumentary: No rash or pruritis  Neurological: No TIA or stroke symptoms  Psychiatric: No anxiety or depression  Endocrine: No malaise, fatigue or temperature intolerance  Hematologic/Lymphatic: No bleeding problems, blood clots or swollen lymph nodes  Allergic/Immunologic: No nasal congestion or hives    All other systems were reviewed and were negative otherwise. Physical Examination:      Vitals:    01/14/23 1326   BP:    Pulse: 80   Resp:    Temp:    SpO2:       Wt Readings from Last 3 Encounters:   01/14/23 226 lb 8 oz (102.7 kg)   01/14/23 226 lb (102.5 kg)   01/05/23 230 lb (104.3 kg)     Body mass index is 33.94 kg/m². General Appearance:  No distress, conversant  Constitutional:  Well developed, Well nourished  HEENT:  Normocephalic, Atraumatic, Oropharynx moist   Nose normal. Neck Supple Carotid: no carotid bruit  Eyes:  Conjunctiva normal, No discharge. Respiratory:    ormal breath sounds, No respiratory distress, No wheezing, no use of accessory muscles, diaphragm movement is normal  No chest Tenderness  Cardiovascular: S1-S2 No murmurs auscultated. No rubs, thrills or gallops. Normal rhythm. Pedal pulses are normal. No pedal edema  GI:  Soft Non tender, non distended. Musculoskeletal:   No tenderness, No cyanosis, No clubbing. Integument:  Warm, Dry, No erythema, No rash. Lymphatic:  No lymphadenopathy noted. Neurologic:  lert & oriented x 3  No focal deficits noted.    Psychiatric:  Affect normal, Judgment normal, Mood normal.       Lab Review     Recent Labs     01/13/23 2221   WBC 9.4   HGB 15.9   HCT 45.1         Recent Labs     01/13/23 2221      K 3.6      CO2 23   BUN 11   CREATININE 0.9     Recent Labs     01/13/23 2221   AST 14*   ALT 16   BILIDIR 0.2   BILITOT 0.4   ALKPHOS 128     No results for input(s): TROPONINI in the last 72 hours. No results found for: BNP  Lab Results   Component Value Date    INR 0.98 01/13/2023    PROTIME 12.6 01/13/2023         All labs, images, EKGs were personally reviewed      Assessment: 78 y. o.year old with PMH of  has a past medical history of Blindness of left eye, CAD (coronary artery disease), Diabetes mellitus ([address] FHx: coronary artery disease, History of echocardiogram, History of exercise stress test, Hyperlipidemia, Hypertension, and S/P CABG x 4.       Medical Decision Making :       Epigastric pain likely in the setting of nausea possible GERD. Not typical for angina. Coronary angiogram from last year reviewed. Shows patent grafts and adequate revascularization. Continue with aspirin statins Coreg losartan.   Recommend outpatient follow-up with Dr. Cheyenne Arguelles, outpatient stress test with echocardiogram  Essential hypertension: Continue with blood pressure medications blood pressure fairly well controlled  Strokelike symptoms: Neurology follow-up  Poorly controlled diabetes mellitus: Continue with medications       Thank you for the consult    Dr. Ze Newman  1/14/2023 2:50 PM

## 2023-01-14 NOTE — ED NOTES
ED TO INPATIENT SBAR HANDOFF    Patient Name: Soy Durham   :  1955  78 y.o. MRN:  [id number]  Preferred Name    ED Room #:  ED25/ED-25  Family/Caregiver Present no   Restraints no   Sitter no   Sepsis Risk Score Sepsis Risk Score: 1.08    Situation  Code Status: Prior No additional code details. Allergies: Lisinopril, Niacin, Vicodin [hydrocodone-acetaminophen], and Percocet [oxycodone-acetaminophen]  Weight:   Patient Vitals for the past 96 hrs (Last 3 readings):   Weight   23 2155 224 lb (101.6 kg)     Arrived from: home  Chief Complaint:   Chief Complaint   Patient presents with    Chest Pain     Tightness       Dizziness           Shortness of Breath     Hospital Problem/Diagnosis:  Principal Problem:    Stroke-like symptoms  Resolved Problems:    * No resolved hospital problems. *    Imaging:   CT HEAD WO CONTRAST   Final Result   No acute intracranial abnormality. XR CHEST PORTABLE   Final Result   Stable appearing chest without acute cardiopulmonary process.            Abnormal labs:   Abnormal Labs Reviewed   CBC WITH AUTO DIFFERENTIAL - Abnormal; Notable for the following components:       Result Value    MCH 31.3 (*)     Monocytes % 8.5 (*)     All other components within normal limits   BASIC METABOLIC PANEL - Abnormal; Notable for the following components:    Glucose 219 (*)     All other components within normal limits   HEPATIC FUNCTION PANEL - Abnormal; Notable for the following components:    AST 14 (*)     Total Protein 6.1 (*)     All other components within normal limits   D-DIMER, QUANTITATIVE - Abnormal; Notable for the following components:    D-Dimer, Quant 311 (*)     All other components within normal limits   APTT - Abnormal; Notable for the following components:    aPTT 23.6 (*)     All other components within normal limits     Critical values: no     Abnormal Assessment Findings: Chest pain, dizziness, shortness of breath    Background  History:   Past Medical History:   Diagnosis Date    Blindness of left eye     born this way    CAD (coronary artery disease)     Diabetes mellitus ([facility] [address]     FHx: coronary artery disease     History of echocardiogram 08/05/2020    EF 45-50%, Limited due to body habitus and sore chest, Mild left ventricular hypertrophy, Grade I Diastolic Dysfunction, Bi atrial enlargement, No significant valvular disease , no pericardial effusion    History of exercise stress test 06/04/2020    Treadmill, Near maximal study negative for angina or ECG evidence of ischemia. Appropriate hemodynamic response to exercise is noted.     Hyperlipidemia     Hypertension     S/P CABG x 4 04/09/2020       Assessment    Vitals/MEWS: MEWS Score: 1  Level of Consciousness: Alert (0)   Vitals:    01/14/23 0230 01/14/23 0300 01/14/23 0315 01/14/23 0330   BP: 130/70 (!) 145/100  (!) 141/86   Pulse: 69 80 74 72   Resp: 16 17 17 15   Temp:  97.8 °F (36.6 °C)  97.8 °F (36.6 °C)   TempSrc:  Oral     SpO2: 96% 97% 96% 96%   Weight:       Height:         FiO2 (%):   O2 Flow Rate: O2 Device: None (Room air)    Cardiac Rhythm:   Pain Assessment: 0/10 [] Verbal [] Jair David Scale  Pain Scale:    Last documented pain score (0-10 scale)    Last documented pain medication administered: NA  Mental Status: oriented, alert, coherent, logical, thought processes intact, and able to concentrate and follow conversation  NIH Score:    C-SSRS: Risk of Suicide: No Risk  Bedside swallow:    [place] Coma Scale (GCS): [place] Coma Scale  Eye Opening: Spontaneous  Best Verbal Response: Oriented  Best Motor Response: Obeys commands  [place] Coma Scale Score: 15  Active LDA's:   Peripheral IV 01/13/23 Right Forearm (Active)   Site Assessment Clean, dry & intact 01/13/23 2224   Line Status Blood return noted 01/13/23 2224   Phlebitis Assessment No symptoms 01/13/23 2224   Infiltration Assessment 0 01/13/23 2224     PO Status: Regular  Pertinent or High Risk Medications/Drips: no   o If Yes, please provide details:   Pending Blood Product Administration: no     You may also review the ED PT Care Timeline found under the Summary Nursing Index tab. Recommendation    Pending orders   Plan for Discharge (if known):    Additional Comments:    If any further questions, please call Sending RN at [phone]    Electronically signed by: Electronically signed by Anshul Demarco RN on 1/14/2023 at 3:36 AM      Daniel Hinton RN  01/14/23 [address], RN  01/14/23 [id number]

## 2023-01-15 LAB
EKG ATRIAL RATE: 80 BPM
EKG DIAGNOSIS: NORMAL
EKG P AXIS: 74 DEGREES
EKG P-R INTERVAL: 156 MS
EKG Q-T INTERVAL: 430 MS
EKG QRS DURATION: 140 MS
EKG QTC CALCULATION (BAZETT): 495 MS
EKG R AXIS: 35 DEGREES
EKG T AXIS: 61 DEGREES
EKG VENTRICULAR RATE: 80 BPM

## 2023-01-15 PROCEDURE — 93010 ELECTROCARDIOGRAM REPORT: CPT | Performed by: INTERNAL MEDICINE

## 2023-01-16 ENCOUNTER — APPOINTMENT (OUTPATIENT)
Dept: GENERAL RADIOLOGY | Age: 68
End: 2023-01-16
Payer: OTHER GOVERNMENT

## 2023-01-16 ENCOUNTER — APPOINTMENT (OUTPATIENT)
Dept: CT IMAGING | Age: 68
End: 2023-01-16
Payer: OTHER GOVERNMENT

## 2023-01-16 ENCOUNTER — HOSPITAL ENCOUNTER (EMERGENCY)
Age: 68
Discharge: HOME | End: 2023-01-17
Attending: EMERGENCY MEDICINE
Payer: OTHER GOVERNMENT

## 2023-01-16 VITALS
SYSTOLIC BLOOD PRESSURE: 94 MMHG | TEMPERATURE: 97.5 F | DIASTOLIC BLOOD PRESSURE: 63 MMHG | OXYGEN SATURATION: 98 % | HEART RATE: 88 BPM | RESPIRATION RATE: 22 BRPM

## 2023-01-16 DIAGNOSIS — R07.9 CHEST PAIN, UNSPECIFIED TYPE: Primary | ICD-10-CM

## 2023-01-16 LAB
ALBUMIN SERPL-MCNC: 3.8 GM/DL (ref 3.4–5)
ALP BLD-CCNC: 114 IU/L (ref 40–129)
ALT SERPL-CCNC: 17 U/L (ref 10–40)
ANION GAP SERPL CALCULATED.3IONS-SCNC: 13 MMOL/L (ref 4–16)
AST SERPL-CCNC: 13 IU/L (ref 15–37)
BASOPHILS ABSOLUTE: 0.1 K/CU MM
BASOPHILS RELATIVE PERCENT: 0.5 % (ref 0–1)
BILIRUB SERPL-MCNC: 0.5 MG/DL (ref 0–1)
BILIRUBIN URINE: NEGATIVE MG/DL
BLOOD, URINE: NEGATIVE
BUN BLDV-MCNC: 16 MG/DL (ref 6–23)
CALCIUM SERPL-MCNC: 9.1 MG/DL (ref 8.3–10.6)
CHLORIDE BLD-SCNC: 100 MMOL/L (ref 99–110)
CLARITY: CLEAR
CO2: 24 MMOL/L (ref 21–32)
COLOR: YELLOW
CREAT SERPL-MCNC: 1.2 MG/DL (ref 0.9–1.3)
DIFFERENTIAL TYPE: ABNORMAL
EOSINOPHILS ABSOLUTE: 0.2 K/CU MM
EOSINOPHILS RELATIVE PERCENT: 1.6 % (ref 0–3)
GFR SERPL CREATININE-BSD FRML MDRD: >60 ML/MIN/1.73M2
GLUCOSE BLD-MCNC: 155 MG/DL (ref 70–99)
GLUCOSE, URINE: >1000 MG/DL
HCT VFR BLD CALC: 46.4 % (ref 42–52)
HEMOGLOBIN: 16.7 GM/DL (ref 13.5–18)
IMMATURE NEUTROPHIL %: 0.5 % (ref 0–0.43)
KETONES, URINE: NEGATIVE MG/DL
LEUKOCYTE ESTERASE, URINE: NEGATIVE
LIPASE: 29 IU/L (ref 13–60)
LYMPHOCYTES ABSOLUTE: 3.4 K/CU MM
LYMPHOCYTES RELATIVE PERCENT: 32.7 % (ref 24–44)
MCH RBC QN AUTO: 31.7 PG (ref 27–31)
MCHC RBC AUTO-ENTMCNC: 36 % (ref 32–36)
MCV RBC AUTO: 88 FL (ref 78–100)
MONOCYTES ABSOLUTE: 0.8 K/CU MM
MONOCYTES RELATIVE PERCENT: 7.1 % (ref 0–4)
NITRITE URINE, QUANTITATIVE: NEGATIVE
NUCLEATED RBC %: 0 %
PDW BLD-RTO: 11.9 % (ref 11.7–14.9)
PH, URINE: 5.5 (ref 5–8)
PLATELET # BLD: 301 K/CU MM (ref 140–440)
PMV BLD AUTO: 10.1 FL (ref 7.5–11.1)
POTASSIUM SERPL-SCNC: 4.2 MMOL/L (ref 3.5–5.1)
PRO-BNP: 99.59 PG/ML
PROTEIN UA: NEGATIVE MG/DL
RBC # BLD: 5.27 M/CU MM (ref 4.6–6.2)
SEGMENTED NEUTROPHILS ABSOLUTE COUNT: 6.1 K/CU MM
SEGMENTED NEUTROPHILS RELATIVE PERCENT: 57.6 % (ref 36–66)
SODIUM BLD-SCNC: 137 MMOL/L (ref 135–145)
SPECIFIC GRAVITY UA: 1.01 (ref 1–1.03)
TOTAL IMMATURE NEUTOROPHIL: 0.05 K/CU MM
TOTAL NUCLEATED RBC: 0 K/CU MM
TOTAL PROTEIN: 6.6 GM/DL (ref 6.4–8.2)
TROPONIN T: <0.01 NG/ML
UROBILINOGEN, URINE: 0.2 MG/DL (ref 0.2–1)
WBC # BLD: 10.5 K/CU MM (ref 4–10.5)

## 2023-01-16 PROCEDURE — A4216 STERILE WATER/SALINE, 10 ML: HCPCS | Performed by: EMERGENCY MEDICINE

## 2023-01-16 PROCEDURE — 81003 URINALYSIS AUTO W/O SCOPE: CPT

## 2023-01-16 PROCEDURE — 96375 TX/PRO/DX INJ NEW DRUG ADDON: CPT

## 2023-01-16 PROCEDURE — 6360000002 HC RX W HCPCS: Performed by: EMERGENCY MEDICINE

## 2023-01-16 PROCEDURE — 71275 CT ANGIOGRAPHY CHEST: CPT

## 2023-01-16 PROCEDURE — 99285 EMERGENCY DEPT VISIT HI MDM: CPT

## 2023-01-16 PROCEDURE — 6360000004 HC RX CONTRAST MEDICATION: Performed by: EMERGENCY MEDICINE

## 2023-01-16 PROCEDURE — 84484 ASSAY OF TROPONIN QUANT: CPT

## 2023-01-16 PROCEDURE — 71045 X-RAY EXAM CHEST 1 VIEW: CPT

## 2023-01-16 PROCEDURE — 93005 ELECTROCARDIOGRAM TRACING: CPT | Performed by: EMERGENCY MEDICINE

## 2023-01-16 PROCEDURE — 96374 THER/PROPH/DIAG INJ IV PUSH: CPT

## 2023-01-16 PROCEDURE — 2580000003 HC RX 258: Performed by: EMERGENCY MEDICINE

## 2023-01-16 PROCEDURE — 83880 ASSAY OF NATRIURETIC PEPTIDE: CPT

## 2023-01-16 PROCEDURE — 2500000003 HC RX 250 WO HCPCS: Performed by: EMERGENCY MEDICINE

## 2023-01-16 PROCEDURE — 85025 COMPLETE CBC W/AUTO DIFF WBC: CPT

## 2023-01-16 PROCEDURE — 80053 COMPREHEN METABOLIC PANEL: CPT

## 2023-01-16 PROCEDURE — 83690 ASSAY OF LIPASE: CPT

## 2023-01-16 RX ORDER — MAGNESIUM HYDROXIDE/ALUMINUM HYDROXICE/SIMETHICONE 120; 1200; 1200 MG/30ML; MG/30ML; MG/30ML
30 SUSPENSION ORAL ONCE
Status: DISCONTINUED | OUTPATIENT
Start: 2023-01-16 | End: 2023-01-17 | Stop reason: HOSPADM

## 2023-01-16 RX ORDER — FENTANYL CITRATE 50 UG/ML
50 INJECTION, SOLUTION INTRAMUSCULAR; INTRAVENOUS
Status: DISCONTINUED | OUTPATIENT
Start: 2023-01-16 | End: 2023-01-17 | Stop reason: HOSPADM

## 2023-01-16 RX ORDER — SODIUM CHLORIDE 0.9 % (FLUSH) 0.9 %
5-40 SYRINGE (ML) INJECTION 2 TIMES DAILY
Status: DISCONTINUED | OUTPATIENT
Start: 2023-01-16 | End: 2023-01-17 | Stop reason: HOSPADM

## 2023-01-16 RX ORDER — 0.9 % SODIUM CHLORIDE 0.9 %
1000 INTRAVENOUS SOLUTION INTRAVENOUS ONCE
Status: COMPLETED | OUTPATIENT
Start: 2023-01-16 | End: 2023-01-17

## 2023-01-16 RX ORDER — 0.9 % SODIUM CHLORIDE 0.9 %
1000 INTRAVENOUS SOLUTION INTRAVENOUS ONCE
Status: COMPLETED | OUTPATIENT
Start: 2023-01-16 | End: 2023-01-16

## 2023-01-16 RX ORDER — ONDANSETRON 2 MG/ML
4 INJECTION INTRAMUSCULAR; INTRAVENOUS EVERY 30 MIN PRN
Status: DISCONTINUED | OUTPATIENT
Start: 2023-01-16 | End: 2023-01-17 | Stop reason: HOSPADM

## 2023-01-16 RX ADMIN — SODIUM CHLORIDE 1000 ML: 9 INJECTION, SOLUTION INTRAVENOUS at 22:04

## 2023-01-16 RX ADMIN — FENTANYL CITRATE 50 MCG: 0.05 INJECTION, SOLUTION INTRAMUSCULAR; INTRAVENOUS at 22:05

## 2023-01-16 RX ADMIN — FAMOTIDINE 20 MG: 10 INJECTION, SOLUTION INTRAVENOUS at 22:05

## 2023-01-16 RX ADMIN — IOPAMIDOL 75 ML: 755 INJECTION, SOLUTION INTRAVENOUS at 23:36

## 2023-01-16 RX ADMIN — ONDANSETRON 4 MG: 2 INJECTION INTRAMUSCULAR; INTRAVENOUS at 22:05

## 2023-01-16 ASSESSMENT — ENCOUNTER SYMPTOMS
EYES NEGATIVE: 1
GASTROINTESTINAL NEGATIVE: 1
ALLERGIC/IMMUNOLOGIC NEGATIVE: 1

## 2023-01-16 ASSESSMENT — PAIN SCALES - GENERAL: PAINLEVEL_OUTOF10: 5

## 2023-01-17 LAB
EKG ATRIAL RATE: 69 BPM
EKG DIAGNOSIS: NORMAL
EKG P AXIS: 66 DEGREES
EKG P-R INTERVAL: 146 MS
EKG Q-T INTERVAL: 408 MS
EKG QRS DURATION: 122 MS
EKG QTC CALCULATION (BAZETT): 437 MS
EKG R AXIS: 25 DEGREES
EKG T AXIS: 45 DEGREES
EKG VENTRICULAR RATE: 69 BPM
TROPONIN T: <0.01 NG/ML

## 2023-01-17 PROCEDURE — 84484 ASSAY OF TROPONIN QUANT: CPT

## 2023-01-17 PROCEDURE — 2580000003 HC RX 258: Performed by: EMERGENCY MEDICINE

## 2023-01-17 PROCEDURE — 93010 ELECTROCARDIOGRAM REPORT: CPT | Performed by: INTERNAL MEDICINE

## 2023-01-17 RX ORDER — 0.9 % SODIUM CHLORIDE 0.9 %
1000 INTRAVENOUS SOLUTION INTRAVENOUS ONCE
Status: COMPLETED | OUTPATIENT
Start: 2023-01-17 | End: 2023-01-17

## 2023-01-17 RX ADMIN — SODIUM CHLORIDE 1000 ML: 9 INJECTION, SOLUTION INTRAVENOUS at 01:07

## 2023-01-17 ASSESSMENT — ENCOUNTER SYMPTOMS: SHORTNESS OF BREATH: 1

## 2023-01-17 NOTE — ED PROVIDER NOTES
[facility]      TRIAGE CHIEF COMPLAINT:   Chest Pain (For two hours)      [place]:  Merton Olszewski is a 78 y.o. male that presents by EMS with complaint of chest pain shortness of breath lightheadedness. Patient states he has a history of CAD he was here in the hospital recently. He was here for strokelike symptoms. Patient states at work today he bent over and when he stood back up he felt lightheaded, dizzy started having some chest pain shortness of breath. Came by EMS. On arrival he is otherwise is well-appearing his blood pressure has been low he states. He denies any fevers nausea vomiting abdominal pain no history of DVT, PE, AAA no other questions or concerns no headache he did not pass out just like he was going to. No other questions. He does follow with cardiology. He has had a CABG. Renetta Yoo REVIEW OF SYSTEMS:  At least 10 systems reviewed and otherwise acutely negative except as in the [address] Review of Systems   Constitutional:  Positive for fatigue. HENT: Negative. Eyes: Negative. Respiratory:  Positive for shortness of breath. Cardiovascular:  Positive for chest pain. Gastrointestinal: Negative. Endocrine: Negative. Genitourinary: Negative. Musculoskeletal: Negative. Skin: Negative. Allergic/Immunologic: Negative. Neurological:  Positive for dizziness and light-headedness. Hematological: Negative. Psychiatric/Behavioral: Negative. Negative for agitation. All other systems reviewed and are negative.     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 Appropriate hemodynamic response to exercise is noted.     Hyperlipidemia     Hypertension     S/P CABG x 4 2020     Past Surgical History:   Procedure Laterality Date    ANUS SURGERY      fisure    CHOLECYSTECTOMY      COLONOSCOPY      COLONOSCOPY  2016    polyps x9, int hem    CORONARY ARTERY BYPASS GRAFT  04/09/2020    x4 SVG-LAD, OM, PDA &  LIMA-DIAG    CORONARY ARTERY BYPASS GRAFT N/A 2020    CABG CORONARY ARTERY BYPASS x4 WITH LIMA, INTRAOP DEVEN, ENDOHARVEST OF THE LEFT SAPHENOUS VEIN performed by Lois Walsh MD at [address] [place] [address], COLON, DIAGNOSTIC  2016    mild reflux esophagitis, non obstructive esophageal ring, hiatal hernia, mild gastritis    FRACTURE SURGERY      left arm    HERNIA REPAIR       Family History   Problem Relation Age of Onset    Diabetes Mother     Heart Disease Mother     Heart Disease Father     Cancer Father     Cancer Brother      Social History     Socioeconomic History    Marital status:      Spouse name: Not on file    Number of children: Not on file    Years of education: Not on file    Highest education level: Not on file   Occupational History    Not on file   Tobacco Use    Smoking status: Former     Packs/day: 0.50     Types: Cigarettes     Start date: 1974     Quit date: 1994     Years since quittin.6    Smokeless tobacco: Never   Vaping Use    Vaping Use: Never used   Substance and Sexual Activity    Alcohol use: No     Comment: caffeine 6-7 20oz pops a day    Drug use: No    Sexual activity: Not on file   Other Topics Concern    Not on file   Social History Narrative    Not on file     Social Determinants of Health     Financial Resource Strain: Not on file   Food Insecurity: Not on file   Transportation Needs: Not on file   Physical Activity: Unknown    Days of Exercise per Week: 0 days    Minutes of Exercise per Session: Not on file   Stress: Not on file   Social Connections: Not on file   Intimate Partner Violence: Not on file   Housing Stability: Not on file     Current Facility-Administered Medications   Medication Dose Route Frequency Provider Last Rate Last Admin    ondansetron (ZOFRAN) injection 4 mg  4 mg IntraVENous Q30 Min PRN Khushboo Barrientosan, DO   4 mg at 01/16/23 2205    fentaNYL (SUBLIMAZE) injection 50 mcg  50 mcg IntraVENous Q1H PRN Khushboo Barrientosan, DO   50 mcg at 01/16/23 2205    aluminum & magnesium hydroxide-simethicone (MAALOX) 200-200-20 MG/5ML suspension 30 mL  30 mL Oral Once Khushboo Aparicio DO        sodium chloride flush 0.9 % injection 5-40 mL  5-40 mL IntraVENous BID Khushboo Aparicio, DO         Current Outpatient Medications   Medication Sig Dispense Refill    empagliflozin (JARDIANCE) 10 MG tablet Take 1 tablet by mouth daily 30 tablet 1    rosuvastatin (CRESTOR) 10 MG tablet Take 1 tablet by mouth nightly 30 tablet 2    nitroGLYCERIN (NITROSTAT) 0.4 MG SL tablet up to max of 3 total doses.  If no relief after 1 dose, call 911. 25 tablet 3    potassium chloride (KLOR-CON M) 20 MEQ extended release tablet TAKE 1 TABLET EVERY DAY 90 tablet 0    losartan (COZAAR) 100 MG tablet TAKE 1 TABLET EVERY DAY 90 tablet 0    glipiZIDE (GLUCOTROL) 10 MG tablet TAKE 1 TABLET TWICE DAILY BEFORE MEALS 180 tablet 0    furosemide (LASIX) 40 MG tablet TAKE 1 TABLET TWICE DAILY 180 tablet 0    spironolactone (ALDACTONE) 25 MG tablet TAKE 1 TABLET EVERY DAY 90 tablet 0    carvedilol (COREG) 6.25 MG tablet TAKE 1 TABLET TWICE DAILY 180 tablet 0    aspirin 325 MG tablet Take 325 mg by mouth daily        Allergies   Allergen Reactions    Lisinopril      Other reaction(s): Cough    Niacin      Other reaction(s): Flushing    Vicodin [Hydrocodone-Acetaminophen] Other (See Comments)     Dislikes the feeling it gives him    Percocet [Oxycodone-Acetaminophen] Nausea And Vomiting     Current Facility-Administered Medications Medication Dose Route Frequency Provider Last Rate Last Admin    ondansetron (ZOFRAN) injection 4 mg  4 mg IntraVENous Q30 Min PRN Groveedson Larsen, DO   4 mg at 01/16/23 2205    fentaNYL (SUBLIMAZE) injection 50 mcg  50 mcg IntraVENous Q1H PRN [place] Larsen, DO   50 mcg at 01/16/23 2205    aluminum & magnesium hydroxide-simethicone (MAALOX) 200-200-20 MG/5ML suspension 30 mL  30 mL Oral Once Groveedson Larsen, DO        sodium chloride flush 0.9 % injection 5-40 mL  5-40 mL IntraVENous BID Groveedson Larsen, DO         Current Outpatient Medications   Medication Sig Dispense Refill    empagliflozin (JARDIANCE) 10 MG tablet Take 1 tablet by mouth daily 30 tablet 1    rosuvastatin (CRESTOR) 10 MG tablet Take 1 tablet by mouth nightly 30 tablet 2    nitroGLYCERIN (NITROSTAT) 0.4 MG SL tablet up to max of 3 total doses. If no relief after 1 dose, call 911. 25 tablet 3    potassium chloride (KLOR-CON M) 20 MEQ extended release tablet TAKE 1 TABLET EVERY DAY 90 tablet 0    losartan (COZAAR) 100 MG tablet TAKE 1 TABLET EVERY DAY 90 tablet 0    glipiZIDE (GLUCOTROL) 10 MG tablet TAKE 1 TABLET TWICE DAILY BEFORE MEALS 180 tablet 0    furosemide (LASIX) 40 MG tablet TAKE 1 TABLET TWICE DAILY 180 tablet 0    spironolactone (ALDACTONE) 25 MG tablet TAKE 1 TABLET EVERY DAY 90 tablet 0    carvedilol (COREG) 6.25 MG tablet TAKE 1 TABLET TWICE DAILY 180 tablet 0    aspirin 325 MG tablet Take 325 mg by mouth daily         Nursing Notes Reviewed    VITAL SIGNS:  ED Triage Vitals [01/16/23 2115]   Enc Vitals Group      BP       Pulse       Resp 22      Temp       Temp src       SpO2       Weight       Height       Head Circumference       Peak Flow       Pain Score       Pain Loc       Pain Edu? Excl. in [address]? PHYSICAL EXAM:  Physical Exam  Vitals and nursing note reviewed. Constitutional:       General: He is not in acute distress. Appearance: Normal appearance.  He is not ill-appearing, toxic-appearing or diaphoretic. HENT:      Head: Normocephalic and atraumatic. Right Ear: External ear normal.      Left Ear: External ear normal.      Nose: No rhinorrhea. Eyes:      General: No scleral icterus. Right eye: No discharge. Left eye: No discharge. Extraocular Movements: Extraocular movements intact. Conjunctiva/sclera: Conjunctivae normal.   Cardiovascular:      Rate and Rhythm: Normal rate and regular rhythm. Pulses: Normal pulses. Heart sounds: No murmur heard. No friction rub. No gallop. Pulmonary:      Effort: Pulmonary effort is normal. No respiratory distress. Breath sounds: Normal breath sounds. No stridor. No wheezing, rhonchi or rales. Abdominal:      General: Bowel sounds are normal. There is no distension. There are no signs of injury. Palpations: There is no mass or pulsatile mass. Tenderness: There is no abdominal tenderness. There is no guarding or rebound. Negative signs include Calvin's sign, Rovsing's sign and McBurney's sign. Hernia: No hernia is present. Musculoskeletal:         General: No swelling, tenderness, deformity or signs of injury. Normal range of motion. Cervical back: Normal range of motion. No rigidity. Right lower leg: No edema. Left lower leg: No edema. Skin:     General: Skin is warm. Coloration: Skin is not jaundiced or pale. Findings: No bruising, erythema or lesion. Neurological:      General: No focal deficit present. Mental Status: He is alert and oriented to person, place, and time. GCS: GCS eye subscore is 4. GCS verbal subscore is 5. GCS motor subscore is 6. Cranial Nerves: Cranial nerves 2-12 are intact. No cranial nerve deficit, dysarthria or facial asymmetry. Sensory: Sensation is intact. No sensory deficit. Motor: Motor function is intact. No weakness, tremor, atrophy, abnormal muscle tone or seizure activity.       Coordination: Coordination normal. Psychiatric:         Mood and Affect: Mood normal.         Behavior: Behavior normal.         Thought Content:  Thought content normal.         Judgment: Judgment normal.         I have reviewed andinterpreted all of the currently available lab results from this visit (if applicable):    Results for orders placed or performed during the hospital encounter of 01/16/23   CBC with Auto Differential   Result Value Ref Range    WBC 10.5 4.0 - 10.5 K/CU MM    RBC 5.27 4.6 - 6.2 M/CU MM    Hemoglobin 16.7 13.5 - 18.0 GM/DL    Hematocrit 46.4 42 - 52 %    MCV 88.0 78 - 100 FL    MCH 31.7 (H) 27 - 31 PG    MCHC 36.0 32.0 - 36.0 %    RDW 11.9 11.7 - 14.9 %    Platelets 233 581 - 331 K/CU MM    MPV 10.1 7.5 - 11.1 FL    Differential Type AUTOMATED DIFFERENTIAL     Segs Relative 57.6 36 - 66 %    Lymphocytes % 32.7 24 - 44 %    Monocytes % 7.1 (H) 0 - 4 %    Eosinophils % 1.6 0 - 3 %    Basophils % 0.5 0 - 1 %    Segs Absolute 6.1 K/CU MM    Lymphocytes Absolute 3.4 K/CU MM    Monocytes Absolute 0.8 K/CU MM    Eosinophils Absolute 0.2 K/CU MM    Basophils Absolute 0.1 K/CU MM    Nucleated RBC % 0.0 %    Total Nucleated RBC 0.0 K/CU MM    Total Immature Neutrophil 0.05 K/CU MM    Immature Neutrophil % 0.5 (H) 0 - 0.43 %   Comprehensive Metabolic Panel   Result Value Ref Range    Sodium 137 135 - 145 MMOL/L    Potassium 4.2 3.5 - 5.1 MMOL/L    Chloride 100 99 - 110 mMol/L    CO2 24 21 - 32 MMOL/L    BUN 16 6 - 23 MG/DL    Creatinine 1.2 0.9 - 1.3 MG/DL    Est, Glom Filt Rate >60 >60 mL/min/1.73m2    Glucose 155 (H) 70 - 99 MG/DL    Calcium 9.1 8.3 - 10.6 MG/DL    Albumin 3.8 3.4 - 5.0 GM/DL    Total Protein 6.6 6.4 - 8.2 GM/DL    Total Bilirubin 0.5 0.0 - 1.0 MG/DL    ALT 17 10 - 40 U/L    AST 13 (L) 15 - 37 IU/L    Alkaline Phosphatase 114 40 - 129 IU/L    Anion Gap 13 4 - 16   Troponin   Result Value Ref Range    Troponin T <0.010 <0.01 NG/ML   Brain Natriuretic Peptide   Result Value Ref Range    Pro-BNP 99.59 <300 PG/ML Lipase   Result Value Ref Range    Lipase 29 13 - 60 IU/L   Urinalysis   Result Value Ref Range    Color, UA YELLOW YELLOW    Clarity, UA CLEAR CLEAR    Glucose, Urine >1,000 (A) NEGATIVE MG/DL    Bilirubin Urine NEGATIVE NEGATIVE MG/DL    Ketones, Urine NEGATIVE NEGATIVE MG/DL    Specific Gravity, UA 1.010 1.001 - 1.035    Blood, Urine NEGATIVE NEGATIVE    pH, Urine 5.5 5.0 - 8.0    Protein, UA NEGATIVE NEGATIVE MG/DL    Urobilinogen, Urine 0.2 0.2 - 1.0 MG/DL    Nitrite Urine, Quantitative NEGATIVE NEGATIVE    Leukocyte Esterase, Urine NEGATIVE NEGATIVE   Troponin   Result Value Ref Range    Troponin T <0.010 <0.01 NG/ML        Radiographs (if obtained):  [] The following radiograph was interpreted by myself in the absence of a radiologist:  [x] Radiologist's Report Reviewed:    CXR, CTA chest    CT HEAD WO CONTRAST    Result Date: 1/14/2023  EXAMINATION: CT OF THE HEAD WITHOUT CONTRAST  1/14/2023 12:40 am TECHNIQUE: CT of the head was performed without the administration of intravenous contrast. Automated exposure control, iterative reconstruction, and/or weight based adjustment of the mA/kV was utilized to reduce the radiation dose to as low as reasonably achievable. COMPARISON: None. HISTORY: ORDERING SYSTEM PROVIDED HISTORY: Transient slurred speech TECHNOLOGIST PROVIDED HISTORY: Reason for exam:->Transient slurred speech Has a \"code stroke\" or \"stroke alert\" been called? ->No Decision Support Exception - unselect if not a suspected or confirmed emergency medical condition->Emergency Medical Condition (MA) Reason for Exam: Transient slurred speech FINDINGS: BRAIN/VENTRICLES: There is no acute intracranial hemorrhage, mass effect or midline shift. No abnormal extra-axial fluid collection. The gray-white differentiation is maintained without evidence of an acute infarct. There is no evidence of hydrocephalus. ORBITS: The visualized portion of the orbits demonstrate no acute abnormality.  SINUSES: The visualized paranasal sinuses and mastoid air cells demonstrate no acute abnormality. SOFT TISSUES/SKULL:  No acute abnormality of the visualized skull or soft tissues. No acute intracranial abnormality. XR CHEST PORTABLE    Result Date: 1/13/2023  EXAMINATION: ONE XRAY VIEW OF THE CHEST 1/13/2023 10:50 pm COMPARISON: 10/05/2021 HISTORY: ORDERING SYSTEM PROVIDED HISTORY: sob TECHNOLOGIST PROVIDED HISTORY: Reason for exam:->sob Reason for Exam: sob FINDINGS: Median sternotomy wires are identified. Cardiac and mediastinal silhouettes appear stable from the previous exam.  Degenerative changes are identified in the shoulders and spine. No focal consolidation or pleural effusion is identified. No pneumothorax is seen. Stable appearing chest without acute cardiopulmonary process. MRI brain without contrast    Result Date: 1/14/2023  EXAMINATION: MRI OF THE BRAIN WITHOUT CONTRAST  1/14/2023 10:38 am TECHNIQUE: Multiplanar multisequence MRI of the brain was performed without the administration of intravenous contrast. COMPARISON: CT brain earlier same day HISTORY: ORDERING SYSTEM PROVIDED HISTORY: stroke like symptoms TECHNOLOGIST PROVIDED HISTORY: Reason for exam:->stroke like symptoms Decision Support Exception - unselect if not a suspected or confirmed emergency medical condition->Emergency Medical Condition (MA) Reason for Exam: stroke-like symptoms FINDINGS: INTRACRANIAL STRUCTURES/VENTRICLES: There are no areas of restricted diffusion identified to suggest an acute infarct. There is no acute intracranial hemorrhage. No mass effect or midline shift is present. There is mild abnormal increased T2/FLAIR signal intensity within the periventricular white matter. There is no sellar or suprasellar mass present. There is no ventriculomegaly or abnormal extra-axial fluid collection present. The proximal portions of the [place] demonstrate normal flow voids.  ORBITS: Limited evaluation of the orbits is unremarkable. SINUSES: The paranasal sinuses and mastoid air cells are clear. BONES/SOFT TISSUES: Bone marrow signal intensity is normal.     1. No acute infarct or acute intracranial process identified. 2. Mild chronic small vessel ischemic changes. EKG (if obtained): (All EKG's are interpreted by myself in the absence of a cardiologist)    12 lead EKG per my interpretation:  Normal Sinus Rhythm 69 RBBB  Axis is   Normal  QTc is   437  There is no specific T wave changes appreciated. There is no specific ST wave changes appreciated. Prior EKG to compare with was not available       MDM:    Patient with complaint of chest pain shortness of breath lightheadedness. Again patient has a history of CABG follows with cardiology he was in the hospital here recently for strokelike symptoms. He states at work tonight he bent over he was fine but he bent over and when he stood back up he felt lightheaded, dizzy start having chest pain shortness of breath. Came by EMS. On arrival patient appears otherwise well. He did not pass out his blood pressure has been around 94 systolic. He denies any headache or abdominal pain no history of DVT, PE, AAA. Otherwise well-appearing. I did give him pain nausea medicine IV fluids I did check labs, imaging including CT of the chest which is negative for dissection, PE, pneumothorax did mention some possible bronchitis. EKG has a right bundle branch block otherwise negative patient had orthostatics performed as well on reevaluation he is feeling better he has no complaints no more chest pain or shortness of breath he was able to stand up and he did well. Patient recheck doing well. He is currently watching television, cough he is in no distress number chest pain. I did do labs, imaging and added a second troponin which was negative I did a CT PE study which showed possible bronchitis but he has no fever no coughing. Otherwise labs unremarkable.   EKG as mentioned was unremarkable. I did review his heart catheter 2021 I did review cardiology's note from 14 January. At that time he had similar symptoms that he had today. At that time cardiology recommended continue medication outpatient follow-up. Thought it was atypical chest pain and has had no more here. Again symptoms started after bending over and standing up too quickly could be GERD could be spasm patient otherwise stable blood pressure is over 103 systolic upon discharge he is resting comfortably again watching television he has no other complaints he is okay to go home he feels okay to go home he was given return precautions and follow-up information, stable. Discharge.     CLINICAL IMPRESSION:  Final diagnoses:   Chest pain, unspecified type       (Please note that portions of this note may have been completed with a voice recognition program. Efforts were made to edit the dictations but occasionally words aremis-transcribed.)    DISPOSITION REFERRAL (if applicable):  ROXANE Aguirre - CNP  [place] [phone]    Schedule an appointment as soon as possible for a visit in 1 day      [facility] Emergency Department  [place] [id number] [phone]    If symptoms worsen    Celsa Maher MD  [address]  [phone]    Schedule an appointment as soon as possible for a visit in 1 day  Cardiology    DISPOSITION MEDICATIONS (if applicable):  New Prescriptions    No medications on file          Monica Cool, [address]  [facility], DO  01/17/23 0222

## 2023-03-13 RX ORDER — GLIPIZIDE 10 MG/1
TABLET ORAL
Qty: 180 TABLET | Refills: 0 | Status: SHIPPED | OUTPATIENT
Start: 2023-03-13

## 2023-03-13 RX ORDER — SPIRONOLACTONE 25 MG/1
TABLET ORAL
Qty: 90 TABLET | Refills: 0 | Status: SHIPPED | OUTPATIENT
Start: 2023-03-13

## 2023-03-13 RX ORDER — LOSARTAN POTASSIUM 100 MG/1
TABLET ORAL
Qty: 90 TABLET | Refills: 0 | Status: SHIPPED | OUTPATIENT
Start: 2023-03-13

## 2023-03-13 RX ORDER — FUROSEMIDE 40 MG/1
TABLET ORAL
Qty: 180 TABLET | Refills: 0 | Status: SHIPPED | OUTPATIENT
Start: 2023-03-13

## 2023-03-13 RX ORDER — CARVEDILOL 6.25 MG/1
TABLET ORAL
Qty: 180 TABLET | Refills: 0 | Status: SHIPPED | OUTPATIENT
Start: 2023-03-13

## 2023-03-23 RX ORDER — ROSUVASTATIN CALCIUM 10 MG/1
10 TABLET, COATED ORAL NIGHTLY
Qty: 90 TABLET | Refills: 2 | Status: SHIPPED | OUTPATIENT
Start: 2023-03-23

## 2023-04-06 ENCOUNTER — TELEPHONE (OUTPATIENT)
Dept: FAMILY MEDICINE CLINIC | Age: 68
End: 2023-04-06

## 2023-05-25 ENCOUNTER — TELEPHONE (OUTPATIENT)
Dept: CARDIOLOGY CLINIC | Age: 68
End: 2023-05-25

## 2023-05-25 NOTE — TELEPHONE ENCOUNTER
Cardiologist: Dr. Montez Samuel  Surgeon: Dr. Elly Kauffman  Surgery: Colonoscopy  Anesthesia: Propofol  Date: 6/13/2023  FAX# [phone]  # [phone]    Last OV 10/26/2020 w/Miky      CAD s/p CABG X4: ECHO SHOWED APICAL hypokinesis and repeat echo showed mild LV dysfunction, LVEF 45-50%,Continue aspirin , continue statins, and coreg, he did not want cardiac rehab  Shortness of breath: BETTER, however, still has leg swelling and gained 5 lbs, will continue aldactone 25mg and continue lasix 40mg bid, check chem 7 to f/u on creatinine and K  DM: stable continue metformin. Dyslipidemia: continue statins, check lipids  HTN: stable, continue coreg medicatons  Health maintenanceHealth maintenance: exerise and diet      Last EKG- 1/16/2023      NM-10/5/2021   Medium sized defect of moderate severity which is reversible involving   anterior /apical wall of myocardium. Normal EF 54 % with normal ventricular contractility. Echo- 10/5/2021   Left ventricular systolic function is normal.   Ejection fraction is visually estimated at 50-55%. Mild left ventricular hypertrophy. Grade I diastolic dysfunction. No evidence of any pericardial effusion        Cath- 10/5/2021   Access : Femoral s/p Angioseal indication : abnormal stress test   Post CABG with LIMA to Diagonal, SVG to OM , SVG to LAd and SVG   to RCA   1. LAD is diffusely diseased and filled by SVG graft , it is   small vessel after graft touch down   2. LIMA graft is attached to DIagonal which then back fills LAD   3. Circ is 100 % occluded but SVG graft is attached to OM1 and   is patent   4.  RCA has long 80-90 % distal lesion, SVG to RCA is patent   LVEDP is 6mmHG      CABG- 4/9/2020        Aspirin

## 2023-06-05 ENCOUNTER — OFFICE VISIT (OUTPATIENT)
Dept: CARDIOLOGY CLINIC | Age: 68
End: 2023-06-05
Payer: OTHER GOVERNMENT

## 2023-06-05 VITALS
HEART RATE: 70 BPM | BODY MASS INDEX: 35.61 KG/M2 | HEIGHT: 68 IN | OXYGEN SATURATION: 98 % | DIASTOLIC BLOOD PRESSURE: 78 MMHG | WEIGHT: 235 LBS | SYSTOLIC BLOOD PRESSURE: 122 MMHG

## 2023-06-05 DIAGNOSIS — I25.119 CORONARY ARTERY DISEASE INVOLVING NATIVE CORONARY ARTERY OF NATIVE HEART WITH ANGINA PECTORIS (HCC): Primary | ICD-10-CM

## 2023-06-05 PROCEDURE — 3017F COLORECTAL CA SCREEN DOC REV: CPT | Performed by: INTERNAL MEDICINE

## 2023-06-05 PROCEDURE — G8417 CALC BMI ABV UP PARAM F/U: HCPCS | Performed by: INTERNAL MEDICINE

## 2023-06-05 PROCEDURE — G8427 DOCREV CUR MEDS BY ELIG CLIN: HCPCS | Performed by: INTERNAL MEDICINE

## 2023-06-05 PROCEDURE — 1123F ACP DISCUSS/DSCN MKR DOCD: CPT | Performed by: INTERNAL MEDICINE

## 2023-06-05 PROCEDURE — 99214 OFFICE O/P EST MOD 30 MIN: CPT | Performed by: INTERNAL MEDICINE

## 2023-06-05 PROCEDURE — 3074F SYST BP LT 130 MM HG: CPT | Performed by: INTERNAL MEDICINE

## 2023-06-05 PROCEDURE — 93000 ELECTROCARDIOGRAM COMPLETE: CPT | Performed by: INTERNAL MEDICINE

## 2023-06-05 PROCEDURE — 3078F DIAST BP <80 MM HG: CPT | Performed by: INTERNAL MEDICINE

## 2023-06-05 PROCEDURE — 1036F TOBACCO NON-USER: CPT | Performed by: INTERNAL MEDICINE

## 2023-06-05 NOTE — PROGRESS NOTES
II through XII are grossly intact. There were no gross focal neurologic abnormalities. Psychiatric: normal mood and affect    No results found for: CKTOTAL, CKMB, CKMBINDEX, TROPONINI  BNP:  No results found for: BNP  PT/INR:  No results found for: PTINR  Lab Results   Component Value Date    LABA1C 10.7 (H) 01/14/2023    LABA1C 12.0 (H) 07/24/2021     Lab Results   Component Value Date    CHOL 80 01/14/2023    TRIG 100 01/14/2023    HDL 24 (L) 01/14/2023    LDLCALC 36 01/14/2023     Lab Results   Component Value Date    ALT 17 01/16/2023    AST 13 (L) 01/16/2023     TSH:  No results found for: TSH    Impression:  Radha Babcock is a 80 y. o.year old who  has a past medical history of Blindness of left eye, CAD (coronary artery disease), Diabetes mellitus ([address] FHx: coronary artery disease, History of echocardiogram, History of exercise stress test, Hyperlipidemia, Hypertension, and S/P CABG x 4. and presents with     Plan:  PREOP FOR COLONOSCOPY: cleared for surgery  CAD s/p CABG X4: ECHO SHOWED APICAL hypokinesis and repeat echo showed mild LV dysfunction, LVEF 45-50%,Continue aspirin , continue statins, and coreg, he did not want cardiac rehab  Shortness of breath: BETTER, however, still has leg swelling and gained 5 lbs, will continue aldactone 25mg and continue lasix 40mg bid, check chem 7 to f/u on creatinine and K  DM: stable continue metformin. Dyslipidemia: continue statins, check lipids  HTN: stable, continue coreg medicatons  Health maintenance: exerise and diet  All labs, medications and tests reviewed, continue all other medications of all above medical condition listed as is.

## 2024-01-12 ENCOUNTER — TELEPHONE (OUTPATIENT)
Dept: CARDIOLOGY CLINIC | Age: 69
End: 2024-01-12

## 2024-04-15 ENCOUNTER — HOSPITAL ENCOUNTER (EMERGENCY)
Age: 69
Discharge: HOME | End: 2024-04-15
Attending: EMERGENCY MEDICINE
Payer: OTHER GOVERNMENT

## 2024-04-15 ENCOUNTER — APPOINTMENT (OUTPATIENT)
Dept: GENERAL RADIOLOGY | Age: 69
End: 2024-04-15
Payer: OTHER GOVERNMENT

## 2024-04-15 VITALS
OXYGEN SATURATION: 95 % | RESPIRATION RATE: 16 BRPM | WEIGHT: 235 LBS | SYSTOLIC BLOOD PRESSURE: 101 MMHG | HEIGHT: 68 IN | HEART RATE: 64 BPM | BODY MASS INDEX: 35.61 KG/M2 | DIASTOLIC BLOOD PRESSURE: 53 MMHG | TEMPERATURE: 97.2 F

## 2024-04-15 DIAGNOSIS — R73.9 HYPERGLYCEMIA: ICD-10-CM

## 2024-04-15 DIAGNOSIS — R55 NEAR SYNCOPE: Primary | ICD-10-CM

## 2024-04-15 LAB
ALBUMIN SERPL-MCNC: 3.5 GM/DL (ref 3.4–5)
ALP BLD-CCNC: 126 IU/L (ref 40–129)
ALT SERPL-CCNC: 19 U/L (ref 10–40)
ANION GAP SERPL CALCULATED.3IONS-SCNC: 11 MMOL/L (ref 7–16)
AST SERPL-CCNC: 17 IU/L (ref 15–37)
BASOPHILS ABSOLUTE: 0 K/CU MM
BASOPHILS RELATIVE PERCENT: 0.4 % (ref 0–1)
BILIRUB SERPL-MCNC: 0.7 MG/DL (ref 0–1)
BUN SERPL-MCNC: 16 MG/DL (ref 6–23)
CALCIUM SERPL-MCNC: 9.3 MG/DL (ref 8.3–10.6)
CHLORIDE BLD-SCNC: 98 MMOL/L (ref 99–110)
CO2: 24 MMOL/L (ref 21–32)
CREAT SERPL-MCNC: 1.1 MG/DL (ref 0.9–1.3)
DIFFERENTIAL TYPE: ABNORMAL
EOSINOPHILS ABSOLUTE: 0.1 K/CU MM
EOSINOPHILS RELATIVE PERCENT: 1.1 % (ref 0–3)
GFR SERPL CREATININE-BSD FRML MDRD: 74 ML/MIN/1.73M2
GLUCOSE BLD-MCNC: 336 MG/DL (ref 70–99)
GLUCOSE SERPL-MCNC: 354 MG/DL (ref 70–99)
HCT VFR BLD CALC: 45.7 % (ref 42–52)
HEMOGLOBIN: 16.5 GM/DL (ref 13.5–18)
IMMATURE NEUTROPHIL %: 0.4 % (ref 0–0.43)
LYMPHOCYTES ABSOLUTE: 2.9 K/CU MM
LYMPHOCYTES RELATIVE PERCENT: 36.5 % (ref 24–44)
MCH RBC QN AUTO: 31.4 PG (ref 27–31)
MCHC RBC AUTO-ENTMCNC: 36.1 % (ref 32–36)
MCV RBC AUTO: 87 FL (ref 78–100)
MONOCYTES ABSOLUTE: 0.6 K/CU MM
MONOCYTES RELATIVE PERCENT: 7 % (ref 0–4)
NEUTROPHILS RELATIVE PERCENT: 54.6 % (ref 36–66)
NUCLEATED RBC %: 0 %
PDW BLD-RTO: 11.5 % (ref 11.7–14.9)
PLATELET # BLD: 253 K/CU MM (ref 140–440)
PMV BLD AUTO: 10.8 FL (ref 7.5–11.1)
POTASSIUM SERPL-SCNC: 4.2 MMOL/L (ref 3.5–5.1)
RBC # BLD: 5.25 M/CU MM (ref 4.6–6.2)
SEGMENTED NEUTROPHILS ABSOLUTE COUNT: 4.3 K/CU MM
SODIUM BLD-SCNC: 133 MMOL/L (ref 135–145)
TOTAL IMMATURE NEUTOROPHIL: 0.03 K/CU MM
TOTAL NUCLEATED RBC: 0 K/CU MM
TOTAL PROTEIN: 6.2 GM/DL (ref 6.4–8.2)
TROPONIN, HIGH SENSITIVITY: 18 NG/L (ref 0–22)
TROPONIN, HIGH SENSITIVITY: 21 NG/L (ref 0–22)
WBC # BLD: 7.8 K/CU MM (ref 4–10.5)

## 2024-04-15 PROCEDURE — 84484 ASSAY OF TROPONIN QUANT: CPT

## 2024-04-15 PROCEDURE — 2580000003 HC RX 258: Performed by: EMERGENCY MEDICINE

## 2024-04-15 PROCEDURE — 71045 X-RAY EXAM CHEST 1 VIEW: CPT

## 2024-04-15 PROCEDURE — 82962 GLUCOSE BLOOD TEST: CPT

## 2024-04-15 PROCEDURE — 85025 COMPLETE CBC W/AUTO DIFF WBC: CPT

## 2024-04-15 PROCEDURE — 80053 COMPREHEN METABOLIC PANEL: CPT

## 2024-04-15 PROCEDURE — 99285 EMERGENCY DEPT VISIT HI MDM: CPT

## 2024-04-15 PROCEDURE — 96360 HYDRATION IV INFUSION INIT: CPT

## 2024-04-15 PROCEDURE — 93005 ELECTROCARDIOGRAM TRACING: CPT | Performed by: PHYSICIAN ASSISTANT

## 2024-04-15 RX ORDER — 0.9 % SODIUM CHLORIDE 0.9 %
500 INTRAVENOUS SOLUTION INTRAVENOUS ONCE
Status: COMPLETED | OUTPATIENT
Start: 2024-04-15 | End: 2024-04-15

## 2024-04-15 RX ADMIN — SODIUM CHLORIDE 500 ML: 9 INJECTION, SOLUTION INTRAVENOUS at 15:00

## 2024-04-15 NOTE — ED PROVIDER NOTES
Triage Chief Complaint:    Dizziness and Bradycardia    HPI   Marshal Keys is a 68 y.o. male that presents for evaluation of lightheadedness.  The patient states that he was bowling and felt little bit lightheaded at the end and states that he noticed his heart rate had dropped.  Patient did not pass out.  He denied any chest pain.  He started to feel back to normal now.  He has not noticed any chest pain or shortness of breath.  Denies any abdominal pain nausea or vomiting.  No leg swelling or calf pain.  He does have a history of CABG and follows with [facility].  He has not had any change to bowel movements or urination.    History from : Patient    Limitations to history : None    ROS:  10 systems reviewed and negative except as above.     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 Appropriate hemodynamic response to exercise is noted.    Hyperlipidemia     Hypertension     S/P CABG x 4 04/09/2020     Past Surgical History:   Procedure Laterality Date    ANUS SURGERY      fisure    CHOLECYSTECTOMY      COLONOSCOPY  2008    COLONOSCOPY  11/29/2016    polyps x9, int hem    CORONARY ARTERY BYPASS GRAFT  04/09/2020    x4 SVG-LAD, OM, PDA &  LIMA-DIAG    CORONARY ARTERY BYPASS GRAFT N/A 04/09/2020    CABG CORONARY ARTERY BYPASS x4 WITH LIMA, INTRAOP DEVEN, ENDOHARVEST OF THE LEFT SAPHENOUS VEIN performed by Leo García MD at [facility] OR    ENDOSCOPY, COLON, DIAGNOSTIC  11/29/2016    mild reflux esophagitis, non obstructive

## 2024-04-15 NOTE — ED TRIAGE NOTES
Pt was bowling, pt started feeling light headed & dizzy. EMS was called. HR initially was 50s. BS per EMS was 419

## 2024-04-15 NOTE — ED NOTES
Family came out into the hallway yelling \"someone come help, he says he doesn't feel good.\" This RN @ BS. Pt states \"I feel bad.\" Asked pt to expand on feeling bad. Pt states \"I feel nauseated & like I may pass out.\" . Pt states he hasn't eaten since breakfast.

## 2024-04-17 ENCOUNTER — TELEPHONE (OUTPATIENT)
Dept: CARDIOLOGY CLINIC | Age: 69
End: 2024-04-17

## 2024-04-17 ENCOUNTER — OFFICE VISIT (OUTPATIENT)
Dept: CARDIOLOGY CLINIC | Age: 69
End: 2024-04-17
Payer: MEDICARE

## 2024-04-17 VITALS
HEIGHT: 70 IN | SYSTOLIC BLOOD PRESSURE: 110 MMHG | WEIGHT: 221 LBS | HEART RATE: 72 BPM | DIASTOLIC BLOOD PRESSURE: 74 MMHG | BODY MASS INDEX: 31.64 KG/M2

## 2024-04-17 DIAGNOSIS — I25.119 CORONARY ARTERY DISEASE INVOLVING NATIVE CORONARY ARTERY OF NATIVE HEART WITH ANGINA PECTORIS (HCC): ICD-10-CM

## 2024-04-17 DIAGNOSIS — E78.2 MIXED HYPERLIPIDEMIA: Primary | ICD-10-CM

## 2024-04-17 DIAGNOSIS — R42 DIZZINESS: ICD-10-CM

## 2024-04-17 DIAGNOSIS — I10 ESSENTIAL (PRIMARY) HYPERTENSION: ICD-10-CM

## 2024-04-17 LAB
EKG ATRIAL RATE: 68 BPM
EKG DIAGNOSIS: NORMAL
EKG P AXIS: 70 DEGREES
EKG P-R INTERVAL: 156 MS
EKG Q-T INTERVAL: 444 MS
EKG QRS DURATION: 140 MS
EKG QTC CALCULATION (BAZETT): 472 MS
EKG R AXIS: 25 DEGREES
EKG T AXIS: 44 DEGREES
EKG VENTRICULAR RATE: 68 BPM

## 2024-04-17 PROCEDURE — 1123F ACP DISCUSS/DSCN MKR DOCD: CPT | Performed by: NURSE PRACTITIONER

## 2024-04-17 PROCEDURE — 99214 OFFICE O/P EST MOD 30 MIN: CPT | Performed by: NURSE PRACTITIONER

## 2024-04-17 PROCEDURE — 1036F TOBACCO NON-USER: CPT | Performed by: NURSE PRACTITIONER

## 2024-04-17 PROCEDURE — G8417 CALC BMI ABV UP PARAM F/U: HCPCS | Performed by: NURSE PRACTITIONER

## 2024-04-17 PROCEDURE — 3074F SYST BP LT 130 MM HG: CPT | Performed by: NURSE PRACTITIONER

## 2024-04-17 PROCEDURE — G8427 DOCREV CUR MEDS BY ELIG CLIN: HCPCS | Performed by: NURSE PRACTITIONER

## 2024-04-17 PROCEDURE — 3078F DIAST BP <80 MM HG: CPT | Performed by: NURSE PRACTITIONER

## 2024-04-17 PROCEDURE — 3017F COLORECTAL CA SCREEN DOC REV: CPT | Performed by: NURSE PRACTITIONER

## 2024-04-17 RX ORDER — FUROSEMIDE 20 MG/1
20 TABLET ORAL DAILY
Qty: 90 TABLET | Refills: 3 | Status: SHIPPED | OUTPATIENT
Start: 2024-04-17

## 2024-04-17 RX ORDER — FUROSEMIDE 20 MG/1
40 TABLET ORAL 2 TIMES DAILY
Qty: 90 TABLET | Refills: 3 | Status: SHIPPED | OUTPATIENT
Start: 2024-04-17 | End: 2024-04-17 | Stop reason: SDUPTHER

## 2024-04-17 RX ORDER — NITROGLYCERIN 0.4 MG/1
TABLET SUBLINGUAL
Qty: 25 TABLET | Refills: 3 | Status: SHIPPED | OUTPATIENT
Start: 2024-04-17

## 2024-04-17 ASSESSMENT — ENCOUNTER SYMPTOMS: SHORTNESS OF BREATH: 0

## 2024-04-17 NOTE — ASSESSMENT & PLAN NOTE
stable, continue with Lasix 40 mg daily, losartan 100 mg daily, Coreg 6.25 mg twice daily, spironolactone 25 mg daily

## 2024-04-17 NOTE — ASSESSMENT & PLAN NOTE
-At or near goal Yes LDL- 36  -He is to continue current medications (Crestor 10 mg) Hepatic function panel WNL. No abdominal pain. No myalgias.     -The nature of cardiac risk has been fully discussed with this patient. I have made him aware of his LDL target goal given his cardiovascular risk analysis. I have discussed the appropriate diet. The need for lifelong compliance in order to reduce risk is stressed. A regular exercise program is recommended to help achieve and maintain normal body weight, fitness and improve lipid balance.

## 2024-04-17 NOTE — PROGRESS NOTES
S/P CABG x 4 04/09/2020       Current Outpatient Medications   Medication Sig Dispense Refill    nitroGLYCERIN (NITROSTAT) 0.4 MG SL tablet up to max of 3 total doses. If no relief after 1 dose, call 911. 25 tablet 3    furosemide (LASIX) 20 MG tablet Take 1 tablet by mouth daily 90 tablet 3    empagliflozin (JARDIANCE) 10 MG tablet Take 1 tablet by mouth daily 90 tablet 2    rosuvastatin (CRESTOR) 10 MG tablet Take 1 tablet by mouth nightly 90 tablet 2    glipiZIDE (GLUCOTROL) 10 MG tablet TAKE 1 TABLET TWICE DAILY BEFORE MEALS 180 tablet 0    losartan (COZAAR) 100 MG tablet TAKE 1 TABLET EVERY DAY (NEED MD APPOINTMENT WITH NEW PROVIDER FOR REFILLS) 90 tablet 0    carvedilol (COREG) 6.25 MG tablet TAKE 1 TABLET TWICE DAILY 180 tablet 0    spironolactone (ALDACTONE) 25 MG tablet TAKE 1 TABLET EVERY DAY 90 tablet 0    aspirin 325 MG tablet Take 1 tablet by mouth daily       No current facility-administered medications for this visit.       Review of Systems:  Review of Systems   Respiratory:  Negative for shortness of breath.    Cardiovascular:  Negative for chest pain, palpitations and leg swelling.   Musculoskeletal: Negative.    Skin: Negative.    Neurological:  Positive for dizziness. Negative for weakness.   All other systems reviewed and are negative.         Objective:      Physical Exam:  /74 (Site: Left Upper Arm, Position: Sitting, Cuff Size: Large Adult)   Pulse 72   Ht 1.778 m (5' 10\")   Wt 100.2 kg (221 lb)   BMI 31.71 kg/m²   Wt Readings from Last 3 Encounters:   04/17/24 100.2 kg (221 lb)   04/15/24 106.6 kg (235 lb)   06/05/23 106.6 kg (235 lb)     Body mass index is 31.71 kg/m².    Physical exam:  Physical Exam  Constitutional:       Appearance: He is well-developed.   Cardiovascular:      Rate and Rhythm: Normal rate and regular rhythm.      Pulses: Intact distal pulses.           Dorsalis pedis pulses are 2+ on the right side and 2+ on the left side.        Posterior tibial pulses are

## 2024-05-02 ENCOUNTER — TELEPHONE (OUTPATIENT)
Dept: CARDIOLOGY CLINIC | Age: 69
End: 2024-05-02

## 2024-05-14 ENCOUNTER — TELEPHONE (OUTPATIENT)
Dept: CARDIOLOGY CLINIC | Age: 69
End: 2024-05-14

## 2024-05-14 DIAGNOSIS — I42.9 CARDIOMYOPATHY, UNSPECIFIED TYPE (HCC): Primary | ICD-10-CM

## 2024-05-14 DIAGNOSIS — R94.31 ABNORMAL ECG DURING EXERCISE STRESS TEST: ICD-10-CM

## 2024-05-14 DIAGNOSIS — I25.119 CORONARY ARTERY DISEASE INVOLVING NATIVE CORONARY ARTERY OF NATIVE HEART WITH ANGINA PECTORIS (HCC): ICD-10-CM

## 2024-05-14 NOTE — TELEPHONE ENCOUNTER
Called pt and advised that Dr would like for him to have an echo completed. Advised that our scheduling dept would be calling him to schedule the test. Pt voiced understanding.     Interpretation Summary      Stress Combined Conclusion: Normal pharmacological myocardial perfusion study. Findings suggest a low risk of cardiac events.    Perfusion Comments: LV perfusion is normal. There is no evidence of inducible ischemia.    Image quality is good.    ECG: The stress ECG was negative for ischemia.    Stress Test: A pharmacological stress test was performed using regadenoson (Lexiscan). Blood pressure demonstrated a normal response and heart rate demonstrated a normal response to stress. The patient's heart rate recovery was normal.     Echo correlation for LVEF   Patient ambulatory to XRAY

## 2024-05-21 ENCOUNTER — TELEPHONE (OUTPATIENT)
Dept: CARDIOLOGY CLINIC | Age: 69
End: 2024-05-21

## 2024-05-21 NOTE — TELEPHONE ENCOUNTER
Called pt and left VM with normal cardiac event monitor results. Advised to keep upcoming appt.       ----- Message from ROXANE Ramon CNP sent at 5/20/2024  8:28 PM EDT -----  F/U as scheduled.     Cardiac event monitor  Order: [id number]  Status: Final result       Visible to patient: Yes (not seen)       Dx: Mixed hyperlipidemia; Dizziness; Esse...    1 Result Note  Details    Reading Physician Reading Date Result Priority   Lulú Bolaños MD  [phone] 5/14/2024 Routine     Result Text  This is cardiac monitor report, basic rhtyhm is sinus, min hr is 53 bpm max hr is117 bpm, no afib, no heart block, no VTACH , few pvcs and APCs noted

## 2024-05-28 ENCOUNTER — OFFICE VISIT (OUTPATIENT)
Dept: CARDIOLOGY CLINIC | Age: 69
End: 2024-05-28
Payer: MEDICARE

## 2024-05-28 VITALS
DIASTOLIC BLOOD PRESSURE: 76 MMHG | HEIGHT: 70 IN | SYSTOLIC BLOOD PRESSURE: 126 MMHG | BODY MASS INDEX: 32.21 KG/M2 | WEIGHT: 225 LBS | HEART RATE: 88 BPM

## 2024-05-28 DIAGNOSIS — I10 ESSENTIAL (PRIMARY) HYPERTENSION: ICD-10-CM

## 2024-05-28 DIAGNOSIS — I25.119 CORONARY ARTERY DISEASE INVOLVING NATIVE CORONARY ARTERY OF NATIVE HEART WITH ANGINA PECTORIS (HCC): ICD-10-CM

## 2024-05-28 DIAGNOSIS — E78.2 MIXED HYPERLIPIDEMIA: Primary | ICD-10-CM

## 2024-05-28 DIAGNOSIS — R42 DIZZINESS: ICD-10-CM

## 2024-05-28 DIAGNOSIS — E66.09 CLASS 1 OBESITY DUE TO EXCESS CALORIES WITH SERIOUS COMORBIDITY AND BODY MASS INDEX (BMI) OF 32.0 TO 32.9 IN ADULT: ICD-10-CM

## 2024-05-28 PROBLEM — E66.811 CLASS 1 OBESITY DUE TO EXCESS CALORIES WITH SERIOUS COMORBIDITY AND BODY MASS INDEX (BMI) OF 32.0 TO 32.9 IN ADULT: Status: ACTIVE | Noted: 2024-05-28

## 2024-05-28 PROCEDURE — 3074F SYST BP LT 130 MM HG: CPT | Performed by: NURSE PRACTITIONER

## 2024-05-28 PROCEDURE — 1123F ACP DISCUSS/DSCN MKR DOCD: CPT | Performed by: NURSE PRACTITIONER

## 2024-05-28 PROCEDURE — G8417 CALC BMI ABV UP PARAM F/U: HCPCS | Performed by: NURSE PRACTITIONER

## 2024-05-28 PROCEDURE — 3017F COLORECTAL CA SCREEN DOC REV: CPT | Performed by: NURSE PRACTITIONER

## 2024-05-28 PROCEDURE — 1036F TOBACCO NON-USER: CPT | Performed by: NURSE PRACTITIONER

## 2024-05-28 PROCEDURE — 99214 OFFICE O/P EST MOD 30 MIN: CPT | Performed by: NURSE PRACTITIONER

## 2024-05-28 PROCEDURE — G8427 DOCREV CUR MEDS BY ELIG CLIN: HCPCS | Performed by: NURSE PRACTITIONER

## 2024-05-28 PROCEDURE — 3078F DIAST BP <80 MM HG: CPT | Performed by: NURSE PRACTITIONER

## 2024-05-28 RX ORDER — BLOOD-GLUCOSE METER
1 KIT MISCELLANEOUS DAILY
Qty: 1 KIT | Refills: 0 | Status: SHIPPED | OUTPATIENT
Start: 2024-05-28

## 2024-05-28 ASSESSMENT — ENCOUNTER SYMPTOMS: SHORTNESS OF BREATH: 0

## 2024-05-28 NOTE — PATIENT INSTRUCTIONS
We are committed to providing you the best care possible.    If you receive a survey after visiting one of our offices, please take time to share your experience concerning your physician office visit.  These surveys are confidential and no health information about you is shared.    We are eager to improve for you and we are counting on your feedback to help make that happen.      **It is YOUR responsibilty to bring medication bottles and/or updated medication list to EACH APPOINTMENT. This will allow us to better serve you and all your healthcare needs**  Thank you for allowing us to care for you today!   We want to ensure we can follow your treatment plan and we strive to give you the best outcomes and experience possible.   If you ever have a life threatening emergency and call 911 - for an ambulance (EMS)   Our providers can only care for you at:   [facility] or [facility].   Even if you have someone take you or you drive yourself we can only care for you in a [facility] facility. Our providers are not setup at the other healthcare locations!   Please be informed that if you contact our office outside of normal business hours the physician on call cannot help with any scheduling or rescheduling issues, procedure instruction questions or any type of medication issue.    We advise you for any urgent/emergency that you go to the nearest emergency room!    PLEASE CALL OUR OFFICE DURING NORMAL BUSINESS HOURS    Monday - Friday   8 am to 5 pm    [place]: [phone]    [place]: [phone]    [place]:  [phone]

## 2024-05-28 NOTE — PROGRESS NOTES
graft touch down   2. LIMA graft is attached to DIagonal which then back fills LAD   3. Circ is 100 % occluded but SVG graft is attached to OM1 and   is patent   4. RCA has long 80-90 % distal lesion, SVG to RCA is patent   LVEDP is 6mmHG     Stress test:5/11/24  Stress Combined Conclusion: Normal pharmacological myocardial perfusion study. Findings suggest a low risk of cardiac events.    Perfusion Comments: LV perfusion is normal. There is no evidence of inducible ischemia.    Image quality is good.    ECG: The stress ECG was negative for ischemia.    Stress Test: A pharmacological stress test was performed using regadenoson (Lexiscan). Blood pressure demonstrated a normal response and heart rate demonstrated a normal response to stress. The patient's heart rate recovery was normal.  EF 42%.     The ASCVD Risk score (Teo DK, et al., 2019) failed to calculate for the following reasons:    The patient has a prior MI or stroke diagnosis      Assessment/ Plan:     Coronary artery disease involving native coronary artery of native heart with angina pectoris (HCC)  Hx of ICM prior to CABG x 4 in 2020.  LHC in 2021 showed patent LIMA-diagonal, SVG-OM, SVG-LAD, and SVG RCA.  Primary and secondary prevention discussed with patient:   -Low sodium cardiac diet   -exercise 30 min a day three days a week    Continue current medications ASA, Lasix, losartan, coreg, aldactone, crestor.     Hyperlipidemia   -At or near goal Yes LDL- 36, A1C 10.7(reports not having glucometer, meter ordered)  -He is to continue current medications (Crestor 10 mg) Hepatic function panel WNL. No abdominal pain. No myalgias.     -The nature of cardiac risk has been fully discussed with this patient. I have made him aware of his LDL target goal given his cardiovascular risk analysis. I have discussed the appropriate diet. The need for lifelong compliance in order to reduce risk is stressed. A regular exercise program is recommended to help achieve

## 2024-06-29 ENCOUNTER — HOSPITAL ENCOUNTER (EMERGENCY)
Age: 69
Discharge: HOME | End: 2024-06-29
Attending: STUDENT IN AN ORGANIZED HEALTH CARE EDUCATION/TRAINING PROGRAM
Payer: MEDICARE

## 2024-06-29 ENCOUNTER — APPOINTMENT (OUTPATIENT)
Dept: GENERAL RADIOLOGY | Age: 69
End: 2024-06-29
Payer: MEDICARE

## 2024-06-29 VITALS
TEMPERATURE: 98.6 F | SYSTOLIC BLOOD PRESSURE: 128 MMHG | OXYGEN SATURATION: 100 % | HEART RATE: 57 BPM | DIASTOLIC BLOOD PRESSURE: 74 MMHG | RESPIRATION RATE: 17 BRPM

## 2024-06-29 DIAGNOSIS — R73.9 HYPERGLYCEMIA: ICD-10-CM

## 2024-06-29 DIAGNOSIS — R06.00 DYSPNEA, UNSPECIFIED TYPE: Primary | ICD-10-CM

## 2024-06-29 LAB
ANION GAP SERPL CALCULATED.3IONS-SCNC: 12 MMOL/L (ref 7–16)
B-OH-BUTYR SERPL-MCNC: 2.1 MG/DL (ref 0–3)
BASE EXCESS: 8 (ref 0–3)
BASOPHILS ABSOLUTE: 0 K/CU MM
BASOPHILS RELATIVE PERCENT: 0.4 % (ref 0–1)
BUN SERPL-MCNC: 12 MG/DL (ref 6–23)
CALCIUM SERPL-MCNC: 9.3 MG/DL (ref 8.3–10.6)
CHLORIDE BLD-SCNC: 97 MMOL/L (ref 99–110)
CO2: 23 MMOL/L (ref 21–32)
COMMENT: ABNORMAL
CREAT SERPL-MCNC: 1 MG/DL (ref 0.9–1.3)
D-DIMER QUANTITATIVE: 0.35 UG/ML (FEU)
DIFFERENTIAL TYPE: ABNORMAL
EOSINOPHILS ABSOLUTE: 0.1 K/CU MM
EOSINOPHILS RELATIVE PERCENT: 1.5 % (ref 0–3)
GFR, ESTIMATED: 82 ML/MIN/1.73M2
GLUCOSE SERPL-MCNC: 450 MG/DL (ref 70–99)
HCO3 VENOUS: 14.3 MMOL/L (ref 22–29)
HCT VFR BLD CALC: 44.5 % (ref 42–52)
HEMOGLOBIN: 16.2 GM/DL (ref 13.5–18)
IMMATURE NEUTROPHIL %: 0.4 % (ref 0–0.43)
LYMPHOCYTES ABSOLUTE: 2.4 K/CU MM
LYMPHOCYTES RELATIVE PERCENT: 29.7 % (ref 24–44)
MCH RBC QN AUTO: 32.2 PG (ref 27–31)
MCHC RBC AUTO-ENTMCNC: 36.4 % (ref 32–36)
MCV RBC AUTO: 88.5 FL (ref 78–100)
MONOCYTES ABSOLUTE: 0.6 K/CU MM
MONOCYTES RELATIVE PERCENT: 7 % (ref 0–4)
NEUTROPHILS ABSOLUTE: 5 K/CU MM
NEUTROPHILS RELATIVE PERCENT: 61 % (ref 36–66)
NUCLEATED RBC %: 0 %
O2 SAT, VEN: 71.1 % (ref 50–70)
PCO2 VENOUS: 22 MMHG (ref 41–51)
PDW BLD-RTO: 11.9 % (ref 11.7–14.9)
PH VENOUS: 7.42 (ref 7.32–7.43)
PLATELET # BLD: 251 K/CU MM (ref 140–440)
PMV BLD AUTO: 10.9 FL (ref 7.5–11.1)
PO2 VENOUS: 35 MMHG (ref 28–48)
POTASSIUM SERPL-SCNC: 4.1 MMOL/L (ref 3.5–5.1)
PRO-BNP: 118.5 PG/ML
PROCALCITONIN SERPL-MCNC: 0.04 NG/ML
RBC # BLD: 5.03 M/CU MM (ref 4.6–6.2)
SODIUM BLD-SCNC: 132 MMOL/L (ref 135–145)
TOTAL IMMATURE NEUTOROPHIL: 0.03 K/CU MM
TOTAL NUCLEATED RBC: 0 K/CU MM
TROPONIN, HIGH SENSITIVITY: 18 NG/L (ref 0–22)
TROPONIN, HIGH SENSITIVITY: 21 NG/L (ref 0–22)
WBC # BLD: 8.2 K/CU MM (ref 4–10.5)

## 2024-06-29 PROCEDURE — 83880 ASSAY OF NATRIURETIC PEPTIDE: CPT

## 2024-06-29 PROCEDURE — 99285 EMERGENCY DEPT VISIT HI MDM: CPT

## 2024-06-29 PROCEDURE — 80048 BASIC METABOLIC PNL TOTAL CA: CPT

## 2024-06-29 PROCEDURE — 82010 KETONE BODYS QUAN: CPT

## 2024-06-29 PROCEDURE — 84145 PROCALCITONIN (PCT): CPT

## 2024-06-29 PROCEDURE — 84484 ASSAY OF TROPONIN QUANT: CPT

## 2024-06-29 PROCEDURE — 85379 FIBRIN DEGRADATION QUANT: CPT

## 2024-06-29 PROCEDURE — 82805 BLOOD GASES W/O2 SATURATION: CPT

## 2024-06-29 PROCEDURE — 85025 COMPLETE CBC W/AUTO DIFF WBC: CPT

## 2024-06-29 PROCEDURE — 71045 X-RAY EXAM CHEST 1 VIEW: CPT

## 2024-06-29 PROCEDURE — 93005 ELECTROCARDIOGRAM TRACING: CPT | Performed by: STUDENT IN AN ORGANIZED HEALTH CARE EDUCATION/TRAINING PROGRAM

## 2024-06-29 ASSESSMENT — PAIN SCALES - GENERAL
PAINLEVEL_OUTOF10: 0
PAINLEVEL_OUTOF10: 0

## 2024-06-29 ASSESSMENT — PAIN - FUNCTIONAL ASSESSMENT: PAIN_FUNCTIONAL_ASSESSMENT: 0-10

## 2024-06-29 NOTE — DISCHARGE INSTRUCTIONS
Make sure to eat and drink plenty of fluids to stay well-hydrated.  Continue taking your medications as prescribed  You can call and follow-up with pulmonology if your symptoms continue  Call and follow-up with your family doctor in the next 1-3 days  Return to the ED if your symptoms worsen or you feel you need to be reevaluated

## 2024-06-29 NOTE — ED PROVIDER NOTES
Emergency Department Encounter        Pt Name: Marshal Keys  MRN: [id number]  Birthdate 1955  Date of evaluation: 6/29/2024  ED Physician: Albert Skinner MD    CHIEF COMPLAINT     Triage Chief Complaint:   Shortness of Breath (Since Sunday)      HISTORY OF PRESENT ILLNESS & REVIEW OF SYSTEMS     History obtained from the patient and staff and guest at bedside.    Marshal Keys is a 69 y.o. male who presents to the emergency department for evaluation of shortness of breath.  Says that since Sunday he has had some shortness of breath.  Says that it is sometimes worse when he is exerting himself but not always.  Denies any chest pain.  Denies any cough.  Denies any fevers CVA or blood clot.  Denies any recent long distance travel.  Denies any cough.  Denies hemoptysis.  Denies any history of cancer.  Also says that for the past 3 weeks or so he has had some cramping pain to his left calf.  Says that it is intermittent.  Says that his sugars been high lately.        Patient denies any new Headache, Fever, Chills, Cough, Chest pain, Abdominal pain, Nausea, Vomiting, Diarrhea, Constipation, and Leg swelling.    The patient has no other acute complaints at this time.  Review of systems as above.          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 Appropriate hemodynamic response to exercise is noted.    Hyperlipidemia     Hypertension     S/P CABG x 4  [id number]  [phone]        Disposition medications (if applicable):  Discharge Medication List as of 6/29/2024  4:29 PM        ED Provider Disposition Time  DISPOSITION Decision To Discharge 06/29/2024 04:25:52 PM            Albert Skinner MD  06/29/24 1733

## 2024-06-29 NOTE — ED PROVIDER NOTES
I did not see this patient.  I reviewed the EKG obtained in this visit.  EKG reveals normal sinus rhythm ventricular to 73 bpm with right bundle branch block with no obvious secondary changes.  No Q waves noted.  QTc is 484 ms.  QRS duration 130 ms and MN interval is 142 ms.     Harrison Butt MD  06/29/24 0979

## 2024-06-30 LAB
EKG ATRIAL RATE: 73 BPM
EKG DIAGNOSIS: NORMAL
EKG P AXIS: 67 DEGREES
EKG P-R INTERVAL: 142 MS
EKG Q-T INTERVAL: 440 MS
EKG QRS DURATION: 130 MS
EKG QTC CALCULATION (BAZETT): 484 MS
EKG R AXIS: 12 DEGREES
EKG T AXIS: 62 DEGREES
EKG VENTRICULAR RATE: 73 BPM

## 2024-06-30 PROCEDURE — 93010 ELECTROCARDIOGRAM REPORT: CPT | Performed by: INTERNAL MEDICINE

## 2024-07-01 ENCOUNTER — OFFICE VISIT (OUTPATIENT)
Dept: CARDIOLOGY CLINIC | Age: 69
End: 2024-07-01
Payer: MEDICARE

## 2024-07-01 VITALS
SYSTOLIC BLOOD PRESSURE: 116 MMHG | OXYGEN SATURATION: 99 % | WEIGHT: 223.8 LBS | HEART RATE: 78 BPM | HEIGHT: 70 IN | BODY MASS INDEX: 32.04 KG/M2 | DIASTOLIC BLOOD PRESSURE: 68 MMHG

## 2024-07-01 DIAGNOSIS — R42 DIZZINESS: ICD-10-CM

## 2024-07-01 DIAGNOSIS — E78.2 MIXED HYPERLIPIDEMIA: Primary | ICD-10-CM

## 2024-07-01 DIAGNOSIS — E66.09 CLASS 1 OBESITY DUE TO EXCESS CALORIES WITH SERIOUS COMORBIDITY AND BODY MASS INDEX (BMI) OF 32.0 TO 32.9 IN ADULT: ICD-10-CM

## 2024-07-01 DIAGNOSIS — R06.02 SOB (SHORTNESS OF BREATH): ICD-10-CM

## 2024-07-01 DIAGNOSIS — I10 ESSENTIAL (PRIMARY) HYPERTENSION: ICD-10-CM

## 2024-07-01 DIAGNOSIS — I25.119 CORONARY ARTERY DISEASE INVOLVING NATIVE CORONARY ARTERY OF NATIVE HEART WITH ANGINA PECTORIS (HCC): ICD-10-CM

## 2024-07-01 PROCEDURE — G8417 CALC BMI ABV UP PARAM F/U: HCPCS | Performed by: NURSE PRACTITIONER

## 2024-07-01 PROCEDURE — G8427 DOCREV CUR MEDS BY ELIG CLIN: HCPCS | Performed by: NURSE PRACTITIONER

## 2024-07-01 PROCEDURE — 3078F DIAST BP <80 MM HG: CPT | Performed by: NURSE PRACTITIONER

## 2024-07-01 PROCEDURE — 99214 OFFICE O/P EST MOD 30 MIN: CPT | Performed by: NURSE PRACTITIONER

## 2024-07-01 PROCEDURE — 3074F SYST BP LT 130 MM HG: CPT | Performed by: NURSE PRACTITIONER

## 2024-07-01 PROCEDURE — 3017F COLORECTAL CA SCREEN DOC REV: CPT | Performed by: NURSE PRACTITIONER

## 2024-07-01 PROCEDURE — 1036F TOBACCO NON-USER: CPT | Performed by: NURSE PRACTITIONER

## 2024-07-01 PROCEDURE — 1123F ACP DISCUSS/DSCN MKR DOCD: CPT | Performed by: NURSE PRACTITIONER

## 2024-07-01 ASSESSMENT — ENCOUNTER SYMPTOMS: SHORTNESS OF BREATH: 1

## 2024-07-01 NOTE — PROGRESS NOTES
effusion    History of exercise stress test 06/04/2020    Treadmill, Near maximal study negative for angina or ECG evidence of ischemia. Appropriate hemodynamic response to exercise is noted.    Hyperlipidemia     Hypertension     S/P CABG x 4 04/09/2020       Current Outpatient Medications   Medication Sig Dispense Refill    glucose monitoring kit 1 kit by Does not apply route daily 1 kit 0    nitroGLYCERIN (NITROSTAT) 0.4 MG SL tablet up to max of 3 total doses. If no relief after 1 dose, call 911. 25 tablet 3    furosemide (LASIX) 20 MG tablet Take 1 tablet by mouth daily 90 tablet 3    empagliflozin (JARDIANCE) 10 MG tablet Take 1 tablet by mouth daily 90 tablet 2    rosuvastatin (CRESTOR) 10 MG tablet Take 1 tablet by mouth nightly 90 tablet 2    glipiZIDE (GLUCOTROL) 10 MG tablet TAKE 1 TABLET TWICE DAILY BEFORE MEALS 180 tablet 0    losartan (COZAAR) 100 MG tablet TAKE 1 TABLET EVERY DAY (NEED MD APPOINTMENT WITH NEW PROVIDER FOR REFILLS) 90 tablet 0    carvedilol (COREG) 6.25 MG tablet TAKE 1 TABLET TWICE DAILY 180 tablet 0    spironolactone (ALDACTONE) 25 MG tablet TAKE 1 TABLET EVERY DAY 90 tablet 0    aspirin 325 MG tablet Take 1 tablet by mouth daily       No current facility-administered medications for this visit.       Review of Systems:  Review of Systems   Respiratory:  Positive for shortness of breath.    Cardiovascular:  Negative for chest pain, palpitations and leg swelling.   Musculoskeletal: Negative.    Skin: Negative.    Neurological:  Negative for dizziness and weakness.   All other systems reviewed and are negative.         Objective:      Physical Exam:  /68 (Site: Left Upper Arm, Position: Sitting, Cuff Size: Large Adult)   Pulse 78   Ht 1.778 m (5' 10\")   Wt 101.5 kg (223 lb 12.8 oz)   SpO2 99%   BMI 32.11 kg/m²   Wt Readings from Last 3 Encounters:   07/01/24 101.5 kg (223 lb 12.8 oz)   05/28/24 102.1 kg (225 lb)   04/17/24 100.2 kg (221 lb)     Body mass index is 32.11

## 2024-07-01 NOTE — PATIENT INSTRUCTIONS
Please be informed that if you contact our office outside of normal business hours the physician on call cannot help with any scheduling or rescheduling issues, procedure instruction questions or any type of medication issue.    We advise you for any urgent/emergency that you go to the nearest emergency room!    PLEASE CALL OUR OFFICE DURING NORMAL BUSINESS HOURS    Monday - Friday   8 am to 5 pm    [place]: [phone]    [place]: [phone]    [place]:  [phone]    **It is YOUR responsibilty to bring medication bottles and/or updated medication list to EACH APPOINTMENT. This will allow us to better serve you and all your healthcare needs**    Thank you for allowing us to care for you today!   We want to ensure we can follow your treatment plan and we strive to give you the best outcomes and experience possible.   If you ever have a life threatening emergency and call 911 - for an ambulance (EMS)   Our providers can only care for you at:   [facility] or [facility].   Even if you have someone take you or you drive yourself we can only care for you in a [facility] facility. Our providers are not setup at the other healthcare locations!

## 2024-07-12 ENCOUNTER — TELEPHONE (OUTPATIENT)
Dept: CARDIOLOGY CLINIC | Age: 69
End: 2024-07-12

## 2024-07-12 NOTE — TELEPHONE ENCOUNTER
Called pt and advised of echo results. Advised no significant changes noted since last echo 10/2021. Reminded of next appt. Pt voiced understanding.     Echo (TTE) complete   Interpretation Summary      Left Ventricle: Normal left ventricular systolic function with a visually estimated EF of 50 - 55%. Left ventricle size is normal. Mildly increased wall thickness. Normal wall motion. Grade II diastolic dysfunction with increased LAP.    No significant valvular disease or regurgitation noted.    Pericardium: No pericardial effusion.    Image quality is good.    Compared to previous study in 10/2021 , no significant changes noted.

## 2024-08-05 ENCOUNTER — OFFICE VISIT (OUTPATIENT)
Dept: CARDIOLOGY CLINIC | Age: 69
End: 2024-08-05
Payer: MEDICARE

## 2024-08-05 ENCOUNTER — TELEPHONE (OUTPATIENT)
Dept: PULMONOLOGY | Age: 69
End: 2024-08-05

## 2024-08-05 VITALS
HEART RATE: 80 BPM | SYSTOLIC BLOOD PRESSURE: 112 MMHG | DIASTOLIC BLOOD PRESSURE: 64 MMHG | HEIGHT: 70 IN | WEIGHT: 227.4 LBS | BODY MASS INDEX: 32.56 KG/M2 | OXYGEN SATURATION: 96 %

## 2024-08-05 DIAGNOSIS — R06.02 SHORTNESS OF BREATH: Primary | ICD-10-CM

## 2024-08-05 PROCEDURE — 1123F ACP DISCUSS/DSCN MKR DOCD: CPT | Performed by: INTERNAL MEDICINE

## 2024-08-05 PROCEDURE — G8427 DOCREV CUR MEDS BY ELIG CLIN: HCPCS | Performed by: INTERNAL MEDICINE

## 2024-08-05 PROCEDURE — 3078F DIAST BP <80 MM HG: CPT | Performed by: INTERNAL MEDICINE

## 2024-08-05 PROCEDURE — G8417 CALC BMI ABV UP PARAM F/U: HCPCS | Performed by: INTERNAL MEDICINE

## 2024-08-05 PROCEDURE — 3017F COLORECTAL CA SCREEN DOC REV: CPT | Performed by: INTERNAL MEDICINE

## 2024-08-05 PROCEDURE — 99214 OFFICE O/P EST MOD 30 MIN: CPT | Performed by: INTERNAL MEDICINE

## 2024-08-05 PROCEDURE — 1036F TOBACCO NON-USER: CPT | Performed by: INTERNAL MEDICINE

## 2024-08-05 PROCEDURE — 3074F SYST BP LT 130 MM HG: CPT | Performed by: INTERNAL MEDICINE

## 2024-08-05 RX ORDER — METHYLPREDNISOLONE 4 MG/1
TABLET ORAL
Qty: 1 KIT | Refills: 1 | Status: SHIPPED | OUTPATIENT
Start: 2024-08-05 | End: 2024-08-11

## 2024-08-05 RX ORDER — FUROSEMIDE 20 MG/1
20 TABLET ORAL 2 TIMES DAILY
Qty: 90 TABLET | Refills: 3 | Status: SHIPPED | OUTPATIENT
Start: 2024-08-05

## 2024-08-05 NOTE — PROGRESS NOTES
constipation, diarrhea or heartburn  Genitourinary: No dysuria, trouble voiding, or hematuria  Musculoskeletal:  No gait disturbance, weakness or joint complaints  Integumentary: No rash or pruritis  Neurological: No TIA or stroke symptoms  Psychiatric: No anxiety or depression  Endocrine: No malaise, fatigue or temperature intolerance  Hematologic/Lymphatic: No bleeding problems, blood clots or swollen lymph nodes  Allergic/Immunologic: No nasal congestion or hives  All systems negative except as marked.   Objective:  /64 (Site: Left Upper Arm, Position: Sitting, Cuff Size: Medium Adult)   Pulse 80   Ht 1.778 m (5' 10\")   Wt 103.1 kg (227 lb 6.4 oz)   SpO2 96%   BMI 32.63 kg/m²   Wt Readings from Last 3 Encounters:   08/05/24 103.1 kg (227 lb 6.4 oz)   07/11/24 101.2 kg (223 lb)   07/01/24 101.5 kg (223 lb 12.8 oz)     Body mass index is 32.63 kg/m².  GENERAL - Alert, oriented, pleasant, in no apparent distress,normal grooming  HEENT - pupils are intact, cornea intact, conjunctive normal color, ears are normal in exam,  Neck - Supple.  No jugular venous distention noted. No carotid bruits, no apical -carotid delay  Respiratory:  REDUCED breath sounds, No respiratory distress, No wheezing, No chest tenderness.,no use of accessory muscles, diaphragm movement is normal  Cardiovascular: (PMI) apex non displaced,no lifts no thrills, no s3,no s4, Normal heart rate, Normal rhythm, No murmurs, No rubs, No gallops. Carotid arteries pulse and amplitude are normal no bruit, no abdominal bruit noted ( normal abdominal aorta ausculation),   Extremities - No cyanosis, clubbing, or significant edema, no varicose veins    Abdomen - No masses, tenderness, or organomegaly, no hepato-splenomegally, no bruits  Musculoskeletal - No significant edema, no kyphosis or scoliosis, no deformity in any extremity noted, muscle strength and tone are normal  Skin: no ulcer,no scar,no stasis dermatitis   Neurologic - alert oriented

## 2024-08-05 NOTE — PATIENT INSTRUCTIONS
**It is YOUR responsibilty to bring medication bottles and/or updated medication list to EACH APPOINTMENT. This will allow us to better serve you and all your healthcare needs**  Thank you for allowing us to care for you today!   We want to ensure we can follow your treatment plan and we strive to give you the best outcomes and experience possible.   If you ever have a life threatening emergency and call 911 - for an ambulance (EMS)   Our providers can only care for you at:   [facility] or [facility].   Even if you have someone take you or you drive yourself we can only care for you in a [facility]. Our providers are not setup at the other healthcare locations!   Please be informed that if you contact our office outside of normal business hours the physician on call cannot help with any scheduling or rescheduling issues, procedure instruction questions or any type of medication issue.    We advise you for any urgent/emergency that you go to the nearest emergency room!    PLEASE CALL OUR OFFICE DURING NORMAL BUSINESS HOURS    Monday - Friday   8 am to 5 pm    [place]: [phone]    [place]: [phone]    [place]:  [phone]  We are committed to providing you the best care possible.    If you receive a survey after visiting one of our offices, please take time to share your experience concerning your physician office visit.  These surveys are confidential and no health information about you is shared.    We are eager to improve for you and we are counting on your feedback to help make that happen.

## 2024-08-21 DIAGNOSIS — R06.02 SHORTNESS OF BREATH: Primary | ICD-10-CM

## 2024-08-23 ENCOUNTER — TELEPHONE (OUTPATIENT)
Dept: CARDIOLOGY CLINIC | Age: 69
End: 2024-08-23

## 2024-08-23 NOTE — TELEPHONE ENCOUNTER
Left message with patient to provide PFT appointment info:  Patient is scheduled at [facility] on 9/20/24, with an arrival time of 10:00 am and a start time of 10:30 am.     Patient should:  -Have no breathing medications/inhalers for 12 hours prior.  -No cologne or strong smelling aftershave  -No smoking/vaping 4 hours prior  -No alcohol/caffeinated drinks  -No exercise  -Avoid cold air exposure  -Have a light meal beforehand  -Wear loose, comfortable clothing  -Bring insurance card and photo id      Patient was advised that should the appointment need to be rescheduled, to contact Central Scheduling at [phone].

## 2024-09-11 ENCOUNTER — TELEPHONE (OUTPATIENT)
Dept: PHARMACY | Facility: CLINIC | Age: 69
End: 2024-09-11

## 2024-09-11 DIAGNOSIS — E78.2 MIXED HYPERLIPIDEMIA: Primary | ICD-10-CM

## 2024-09-16 RX ORDER — ROSUVASTATIN CALCIUM 10 MG/1
10 TABLET, COATED ORAL NIGHTLY
Qty: 90 TABLET | Refills: 2 | Status: SHIPPED | OUTPATIENT
Start: 2024-09-16

## 2024-09-18 ENCOUNTER — TELEPHONE (OUTPATIENT)
Dept: CARDIOLOGY CLINIC | Age: 69
End: 2024-09-18

## 2024-09-20 ENCOUNTER — HOSPITAL ENCOUNTER (OUTPATIENT)
Dept: PULMONOLOGY | Age: 69
Discharge: HOME | End: 2024-09-20
Attending: INTERNAL MEDICINE

## 2024-09-22 ENCOUNTER — APPOINTMENT (OUTPATIENT)
Dept: CT IMAGING | Age: 69
End: 2024-09-22
Payer: MEDICARE

## 2024-09-22 ENCOUNTER — HOSPITAL ENCOUNTER (EMERGENCY)
Age: 69
Discharge: HOME | End: 2024-09-22
Payer: MEDICARE

## 2024-09-22 VITALS
SYSTOLIC BLOOD PRESSURE: 147 MMHG | RESPIRATION RATE: 16 BRPM | OXYGEN SATURATION: 97 % | DIASTOLIC BLOOD PRESSURE: 93 MMHG | HEART RATE: 77 BPM | TEMPERATURE: 98 F

## 2024-09-22 DIAGNOSIS — S00.91XA ABRASION OF HEAD, INITIAL ENCOUNTER: Primary | ICD-10-CM
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
## 2024-09-22 PROCEDURE — 70450 CT HEAD/BRAIN W/O DYE: CPT

## 2024-09-22 PROCEDURE — 99284 EMERGENCY DEPT VISIT MOD MDM: CPT

## 2024-09-22 PROCEDURE — 72125 CT NECK SPINE W/O DYE: CPT

## 2024-09-25 ENCOUNTER — OFFICE VISIT (OUTPATIENT)
Dept: INTERNAL MEDICINE CLINIC | Age: 69
End: 2024-09-25
Payer: MEDICARE

## 2024-09-25 VITALS
HEART RATE: 82 BPM | SYSTOLIC BLOOD PRESSURE: 132 MMHG | OXYGEN SATURATION: 100 % | RESPIRATION RATE: 18 BRPM | HEIGHT: 70 IN | BODY MASS INDEX: 32.21 KG/M2 | WEIGHT: 225 LBS | DIASTOLIC BLOOD PRESSURE: 78 MMHG

## 2024-09-25 DIAGNOSIS — S06.0X9D CONCUSSION WITH LOSS OF CONSCIOUSNESS, SUBSEQUENT ENCOUNTER: ICD-10-CM

## 2024-09-25 DIAGNOSIS — I10 BENIGN ESSENTIAL HYPERTENSION: ICD-10-CM

## 2024-09-25 DIAGNOSIS — E78.2 MIXED HYPERLIPIDEMIA: ICD-10-CM

## 2024-09-25 DIAGNOSIS — I25.119 CORONARY ARTERY DISEASE INVOLVING NATIVE CORONARY ARTERY OF NATIVE HEART WITH ANGINA PECTORIS (HCC): ICD-10-CM

## 2024-09-25 DIAGNOSIS — E11.9 TYPE 2 DIABETES MELLITUS WITHOUT COMPLICATION, WITHOUT LONG-TERM CURRENT USE OF INSULIN (HCC): ICD-10-CM

## 2024-09-25 DIAGNOSIS — Z12.5 SCREENING FOR MALIGNANT NEOPLASM OF PROSTATE: ICD-10-CM

## 2024-09-25 DIAGNOSIS — G62.9 PERIPHERAL POLYNEUROPATHY: ICD-10-CM

## 2024-09-25 DIAGNOSIS — Z12.11 SCREENING FOR MALIGNANT NEOPLASM OF COLON: ICD-10-CM

## 2024-09-25 DIAGNOSIS — Z00.00 ENCOUNTER FOR MEDICAL EXAMINATION TO ESTABLISH CARE: Primary | ICD-10-CM

## 2024-09-25 DIAGNOSIS — Z00.00 MEDICARE ANNUAL WELLNESS VISIT, SUBSEQUENT: Primary | ICD-10-CM

## 2024-09-25 PROBLEM — I20.0 UNSTABLE ANGINA PECTORIS (HCC): Status: RESOLVED | Noted: 2021-10-07 | Resolved: 2024-09-25

## 2024-09-25 PROCEDURE — G8417 CALC BMI ABV UP PARAM F/U: HCPCS | Performed by: NURSE PRACTITIONER

## 2024-09-25 PROCEDURE — G0439 PPPS, SUBSEQ VISIT: HCPCS | Performed by: NURSE PRACTITIONER

## 2024-09-25 PROCEDURE — 3078F DIAST BP <80 MM HG: CPT | Performed by: NURSE PRACTITIONER

## 2024-09-25 PROCEDURE — 1036F TOBACCO NON-USER: CPT | Performed by: NURSE PRACTITIONER

## 2024-09-25 PROCEDURE — 99204 OFFICE O/P NEW MOD 45 MIN: CPT | Performed by: NURSE PRACTITIONER

## 2024-09-25 PROCEDURE — 3075F SYST BP GE 130 - 139MM HG: CPT | Performed by: NURSE PRACTITIONER

## 2024-09-25 PROCEDURE — 2022F DILAT RTA XM EVC RTNOPTHY: CPT | Performed by: NURSE PRACTITIONER

## 2024-09-25 PROCEDURE — 3017F COLORECTAL CA SCREEN DOC REV: CPT | Performed by: NURSE PRACTITIONER

## 2024-09-25 PROCEDURE — 1123F ACP DISCUSS/DSCN MKR DOCD: CPT | Performed by: NURSE PRACTITIONER

## 2024-09-25 PROCEDURE — 3046F HEMOGLOBIN A1C LEVEL >9.0%: CPT | Performed by: NURSE PRACTITIONER

## 2024-09-25 PROCEDURE — G8427 DOCREV CUR MEDS BY ELIG CLIN: HCPCS | Performed by: NURSE PRACTITIONER

## 2024-09-25 RX ORDER — GABAPENTIN 300 MG/1
600 CAPSULE ORAL NIGHTLY
Qty: 60 CAPSULE | Refills: 3 | Status: SHIPPED | OUTPATIENT
Start: 2024-09-25 | End: 2024-10-25

## 2024-09-25 RX ORDER — ACYCLOVIR 400 MG/1
1 TABLET ORAL
Qty: 6 EACH | Refills: 0 | Status: SHIPPED | OUTPATIENT
Start: 2024-09-25

## 2024-09-25 RX ORDER — ACYCLOVIR 400 MG/1
1 TABLET ORAL ONCE
Qty: 1 EACH | Refills: 0 | Status: SHIPPED | OUTPATIENT
Start: 2024-09-25 | End: 2024-09-25

## 2024-09-25 SDOH — ECONOMIC STABILITY: FOOD INSECURITY: WITHIN THE PAST 12 MONTHS, THE FOOD YOU BOUGHT JUST DIDN'T LAST AND YOU DIDN'T HAVE MONEY TO GET MORE.: NEVER TRUE

## 2024-09-25 SDOH — ECONOMIC STABILITY: FOOD INSECURITY: WITHIN THE PAST 12 MONTHS, YOU WORRIED THAT YOUR FOOD WOULD RUN OUT BEFORE YOU GOT MONEY TO BUY MORE.: NEVER TRUE

## 2024-09-25 SDOH — ECONOMIC STABILITY: INCOME INSECURITY: HOW HARD IS IT FOR YOU TO PAY FOR THE VERY BASICS LIKE FOOD, HOUSING, MEDICAL CARE, AND HEATING?: NOT HARD AT ALL

## 2024-09-25 ASSESSMENT — PATIENT HEALTH QUESTIONNAIRE - PHQ9
SUM OF ALL RESPONSES TO PHQ QUESTIONS 1-9: 0
1. LITTLE INTEREST OR PLEASURE IN DOING THINGS: NOT AT ALL
5. POOR APPETITE OR OVEREATING: NOT AT ALL
8. MOVING OR SPEAKING SO SLOWLY THAT OTHER PEOPLE COULD HAVE NOTICED. OR THE OPPOSITE, BEING SO FIGETY OR RESTLESS THAT YOU HAVE BEEN MOVING AROUND A LOT MORE THAN USUAL: NOT AT ALL
7. TROUBLE CONCENTRATING ON THINGS, SUCH AS READING THE NEWSPAPER OR WATCHING TELEVISION: NOT AT ALL
DEPRESSION UNABLE TO ASSESS: FUNCTIONAL CAPACITY MOTIVATION LIMITS ACCURACY
4. FEELING TIRED OR HAVING LITTLE ENERGY: NOT AT ALL
SUM OF ALL RESPONSES TO PHQ QUESTIONS 1-9: 0
2. FEELING DOWN, DEPRESSED OR HOPELESS: NOT AT ALL
10. IF YOU CHECKED OFF ANY PROBLEMS, HOW DIFFICULT HAVE THESE PROBLEMS MADE IT FOR YOU TO DO YOUR WORK, TAKE CARE OF THINGS AT HOME, OR GET ALONG WITH OTHER PEOPLE: NOT DIFFICULT AT ALL
SUM OF ALL RESPONSES TO PHQ9 QUESTIONS 1 & 2: 0
3. TROUBLE FALLING OR STAYING ASLEEP: NOT AT ALL
1. LITTLE INTEREST OR PLEASURE IN DOING THINGS: NOT AT ALL
7. TROUBLE CONCENTRATING ON THINGS, SUCH AS READING THE NEWSPAPER OR WATCHING TELEVISION: NOT AT ALL
SUM OF ALL RESPONSES TO PHQ QUESTIONS 1-9: 0
4. FEELING TIRED OR HAVING LITTLE ENERGY: NOT AT ALL
SUM OF ALL RESPONSES TO PHQ QUESTIONS 1-9: 0
SUM OF ALL RESPONSES TO PHQ QUESTIONS 1-9: 0
8. MOVING OR SPEAKING SO SLOWLY THAT OTHER PEOPLE COULD HAVE NOTICED. OR THE OPPOSITE, BEING SO FIGETY OR RESTLESS THAT YOU HAVE BEEN MOVING AROUND A LOT MORE THAN USUAL: NOT AT ALL
9. THOUGHTS THAT YOU WOULD BE BETTER OFF DEAD, OR OF HURTING YOURSELF: NOT AT ALL
5. POOR APPETITE OR OVEREATING: NOT AT ALL
2. FEELING DOWN, DEPRESSED OR HOPELESS: NOT AT ALL
SUM OF ALL RESPONSES TO PHQ9 QUESTIONS 1 & 2: 0
3. TROUBLE FALLING OR STAYING ASLEEP: NOT AT ALL
9. THOUGHTS THAT YOU WOULD BE BETTER OFF DEAD, OR OF HURTING YOURSELF: NOT AT ALL
6. FEELING BAD ABOUT YOURSELF - OR THAT YOU ARE A FAILURE OR HAVE LET YOURSELF OR YOUR FAMILY DOWN: NOT AT ALL
6. FEELING BAD ABOUT YOURSELF - OR THAT YOU ARE A FAILURE OR HAVE LET YOURSELF OR YOUR FAMILY DOWN: NOT AT ALL
10. IF YOU CHECKED OFF ANY PROBLEMS, HOW DIFFICULT HAVE THESE PROBLEMS MADE IT FOR YOU TO DO YOUR WORK, TAKE CARE OF THINGS AT HOME, OR GET ALONG WITH OTHER PEOPLE: NOT DIFFICULT AT ALL
DEPRESSION UNABLE TO ASSESS: FUNCTIONAL CAPACITY MOTIVATION LIMITS ACCURACY
SUM OF ALL RESPONSES TO PHQ QUESTIONS 1-9: 0

## 2024-09-25 ASSESSMENT — ENCOUNTER SYMPTOMS
ABDOMINAL DISTENTION: 0
WHEEZING: 0
COUGH: 0
CONSTIPATION: 0
SINUS PAIN: 0
SINUS PRESSURE: 0
SHORTNESS OF BREATH: 0
DIARRHEA: 0
COLOR CHANGE: 0
EYES NEGATIVE: 1
ABDOMINAL PAIN: 0
NAUSEA: 0
CHEST TIGHTNESS: 0
SORE THROAT: 0

## 2024-09-30 DIAGNOSIS — E11.9 TYPE 2 DIABETES MELLITUS WITHOUT COMPLICATION, WITHOUT LONG-TERM CURRENT USE OF INSULIN (HCC): Primary | ICD-10-CM

## 2024-09-30 RX ORDER — BLOOD-GLUCOSE SENSOR
1 EACH MISCELLANEOUS SEE ADMIN INSTRUCTIONS
Qty: 3 EACH | Refills: 3 | Status: SHIPPED | OUTPATIENT
Start: 2024-09-30

## 2024-09-30 RX ORDER — KETOROLAC TROMETHAMINE 30 MG/ML
1 INJECTION, SOLUTION INTRAMUSCULAR; INTRAVENOUS PRN
Qty: 3 EACH | Refills: 3 | Status: SHIPPED | OUTPATIENT
Start: 2024-09-30

## 2024-10-17 LAB
A/G RATIO: 1.6 RATIO (ref 0.8–2.6)
ALBUMIN: 4.1 G/DL (ref 3.5–5.2)
ALP BLD-CCNC: 136 U/L (ref 23–144)
ALT SERPL-CCNC: 13 U/L (ref 0–60)
AST SERPL-CCNC: 10 U/L (ref 0–55)
BASOPHILS ABSOLUTE: 0 K/UL (ref 0–0.3)
BASOPHILS RELATIVE PERCENT: 0.4 % (ref 0–2)
BILIRUB SERPL-MCNC: 0.7 MG/DL (ref 0–1.2)
BUN / CREAT RATIO: 13 (ref 7–25)
BUN BLDV-MCNC: 14 MG/DL (ref 3–29)
CALCIUM SERPL-MCNC: 9.4 MG/DL (ref 8.5–10.5)
CHLORIDE BLD-SCNC: 98 MEQ/L (ref 96–110)
CHOLESTEROL, TOTAL: 208 MG/DL
CO2: 28 MEQ/L (ref 19–32)
CREAT SERPL-MCNC: 1.1 MG/DL (ref 0.5–1.2)
CREATININE URINE: 84.3 MG/DL
DIFFERENTIAL COUNT: NORMAL
EOSINOPHILS ABSOLUTE: 0.1 K/UL (ref 0–0.5)
EOSINOPHILS RELATIVE PERCENT: 1.1 % (ref 0–5)
ESTIMATED GLOMERULAR FILTRATION RATE CREATININE EQUATION: 73 MLS/MIN/1.73M2
FASTING STATUS: ABNORMAL
GLOBULIN: 2.5 G/DL (ref 1.9–3.6)
GLUCOSE BLD-MCNC: 344 MG/DL (ref 70–99)
HBA1C MFR BLD: 11.4 %
HCT VFR BLD CALC: 50.5 % (ref 37.5–51)
HDLC SERPL-MCNC: 33 MG/DL
HEMOGLOBIN: 17.2 G/DL (ref 13–17.7)
IMMATURE GRANS (ABS): 0 K/UL (ref 0–0.1)
IMMATURE GRANULOCYTES %: 0.4 %
LDL CHOLESTEROL: 128 MG/DL
LYMPHOCYTES ABSOLUTE: 2.2 K/UL (ref 0.9–4.1)
LYMPHOCYTES RELATIVE PERCENT: 22.5 % (ref 14–51)
MCH RBC QN AUTO: 32.4 PG (ref 26–34)
MCHC RBC AUTO-ENTMCNC: 34.1 G/DL (ref 30.7–35.5)
MCV RBC AUTO: 95.1 FL (ref 80–100)
MICROALBUMIN/CREAT 24H UR: 1460 MCG/DL
MICROALBUMIN/CREAT UR-RTO: 17 MCG/MG CREAT.
MONOCYTES ABSOLUTE: 0.8 K/UL (ref 0.2–1)
MONOCYTES RELATIVE PERCENT: 8.1 % (ref 4–12)
NEUTROPHILS ABSOLUTE: 6.6 K/UL (ref 1.8–7.5)
NEUTROPHILS RELATIVE PERCENT: 67.5 % (ref 42–80)
PDW BLD-RTO: 12.5 %
PLATELET # BLD: 268 K/UL (ref 140–400)
PMV BLD AUTO: 10.7 FL (ref 7.2–11.7)
POTASSIUM SERPL-SCNC: 4.9 MEQ/L (ref 3.4–5.3)
PROSTATE SPECIFIC ANTIGEN: 0.52 NG/ML
RBC # BLD: 5.31 M/UL (ref 4.14–5.8)
RETICULOCYTE ABSOLUTE COUNT: 0 /100 WBC
SODIUM BLD-SCNC: 133 MEQ/L (ref 135–148)
TOTAL PROTEIN: 6.6 G/DL (ref 6–8.3)
TRIGL SERPL-MCNC: 234 MG/DL
VLDLC SERPL CALC-MCNC: 47 MG/DL (ref 4–38)
WBC # BLD: 9.8 K/UL (ref 3.5–10.9)

## 2024-10-17 RX ORDER — INSULIN GLARGINE 100 [IU]/ML
20 INJECTION, SOLUTION SUBCUTANEOUS NIGHTLY
Qty: 5 ADJUSTABLE DOSE PRE-FILLED PEN SYRINGE | Refills: 2 | Status: SHIPPED | OUTPATIENT
Start: 2024-10-17

## 2024-10-22 DIAGNOSIS — G62.9 PERIPHERAL POLYNEUROPATHY: ICD-10-CM

## 2024-10-22 DIAGNOSIS — E11.9 TYPE 2 DIABETES MELLITUS WITHOUT COMPLICATION, WITHOUT LONG-TERM CURRENT USE OF INSULIN (HCC): ICD-10-CM

## 2024-10-22 RX ORDER — ASPIRIN 325 MG
325 TABLET ORAL DAILY
Qty: 30 TABLET | Refills: 3 | Status: SHIPPED | OUTPATIENT
Start: 2024-10-22

## 2024-10-22 RX ORDER — CARVEDILOL 6.25 MG/1
6.25 TABLET ORAL 2 TIMES DAILY
Qty: 180 TABLET | Refills: 0 | Status: SHIPPED | OUTPATIENT
Start: 2024-10-22

## 2024-10-22 RX ORDER — INSULIN GLARGINE 100 [IU]/ML
20 INJECTION, SOLUTION SUBCUTANEOUS NIGHTLY
Qty: 5 ADJUSTABLE DOSE PRE-FILLED PEN SYRINGE | Refills: 2 | Status: SHIPPED | OUTPATIENT
Start: 2024-10-22

## 2024-10-22 RX ORDER — GLIPIZIDE 10 MG/1
10 TABLET ORAL
Qty: 180 TABLET | Refills: 0 | Status: SHIPPED | OUTPATIENT
Start: 2024-10-22

## 2024-10-22 RX ORDER — FUROSEMIDE 20 MG/1
20 TABLET ORAL 2 TIMES DAILY
Qty: 90 TABLET | Refills: 3 | Status: SHIPPED | OUTPATIENT
Start: 2024-10-22

## 2024-10-22 RX ORDER — GABAPENTIN 300 MG/1
600 CAPSULE ORAL NIGHTLY
Qty: 60 CAPSULE | Refills: 3 | Status: SHIPPED | OUTPATIENT
Start: 2024-10-22 | End: 2024-11-21

## 2024-10-22 RX ORDER — SPIRONOLACTONE 25 MG/1
25 TABLET ORAL DAILY
Qty: 90 TABLET | Refills: 0 | Status: SHIPPED | OUTPATIENT
Start: 2024-10-22

## 2024-10-22 RX ORDER — ROSUVASTATIN CALCIUM 10 MG/1
10 TABLET, COATED ORAL NIGHTLY
Qty: 90 TABLET | Refills: 2 | Status: SHIPPED | OUTPATIENT
Start: 2024-10-22

## 2024-10-22 RX ORDER — LOSARTAN POTASSIUM 100 MG/1
100 TABLET ORAL DAILY
Qty: 90 TABLET | Refills: 0 | Status: SHIPPED | OUTPATIENT
Start: 2024-10-22

## 2025-03-20 ENCOUNTER — COMMUNITY OUTREACH (OUTPATIENT)
Dept: INTERNAL MEDICINE CLINIC | Age: 70
End: 2025-03-20

## 2025-04-08 ENCOUNTER — TELEMEDICINE (OUTPATIENT)
Dept: INTERNAL MEDICINE CLINIC | Age: 70
End: 2025-04-08
Payer: MEDICARE

## 2025-04-08 DIAGNOSIS — Z00.00 MEDICARE ANNUAL WELLNESS VISIT, SUBSEQUENT: Primary | ICD-10-CM

## 2025-04-08 PROCEDURE — 1159F MED LIST DOCD IN RCRD: CPT | Performed by: NURSE PRACTITIONER

## 2025-04-08 PROCEDURE — G0439 PPPS, SUBSEQ VISIT: HCPCS | Performed by: NURSE PRACTITIONER

## 2025-04-08 PROCEDURE — 3017F COLORECTAL CA SCREEN DOC REV: CPT | Performed by: NURSE PRACTITIONER

## 2025-04-08 PROCEDURE — 1160F RVW MEDS BY RX/DR IN RCRD: CPT | Performed by: NURSE PRACTITIONER

## 2025-04-08 PROCEDURE — 1123F ACP DISCUSS/DSCN MKR DOCD: CPT | Performed by: NURSE PRACTITIONER

## 2025-04-08 SDOH — ECONOMIC STABILITY: FOOD INSECURITY: WITHIN THE PAST 12 MONTHS, THE FOOD YOU BOUGHT JUST DIDN'T LAST AND YOU DIDN'T HAVE MONEY TO GET MORE.: NEVER TRUE

## 2025-04-08 SDOH — ECONOMIC STABILITY: FOOD INSECURITY: WITHIN THE PAST 12 MONTHS, YOU WORRIED THAT YOUR FOOD WOULD RUN OUT BEFORE YOU GOT MONEY TO BUY MORE.: NEVER TRUE

## 2025-04-08 ASSESSMENT — PATIENT HEALTH QUESTIONNAIRE - PHQ9
2. FEELING DOWN, DEPRESSED OR HOPELESS: NOT AT ALL
1. LITTLE INTEREST OR PLEASURE IN DOING THINGS: NOT AT ALL
DEPRESSION UNABLE TO ASSESS: FUNCTIONAL CAPACITY MOTIVATION LIMITS ACCURACY
SUM OF ALL RESPONSES TO PHQ QUESTIONS 1-9: 0
SUM OF ALL RESPONSES TO PHQ QUESTIONS 1-9: 0
1. LITTLE INTEREST OR PLEASURE IN DOING THINGS: NOT AT ALL
SUM OF ALL RESPONSES TO PHQ QUESTIONS 1-9: 0
2. FEELING DOWN, DEPRESSED OR HOPELESS: NOT AT ALL
SUM OF ALL RESPONSES TO PHQ QUESTIONS 1-9: 0
SUM OF ALL RESPONSES TO PHQ QUESTIONS 1-9: 0

## 2025-04-08 ASSESSMENT — LIFESTYLE VARIABLES
HOW MANY STANDARD DRINKS CONTAINING ALCOHOL DO YOU HAVE ON A TYPICAL DAY: PATIENT DOES NOT DRINK
HOW OFTEN DO YOU HAVE A DRINK CONTAINING ALCOHOL: NEVER

## 2025-04-08 NOTE — PATIENT INSTRUCTIONS
problems.  Where can you learn more?  Go to [email or web address] and enter F075 to learn more about \"A Healthy Heart: Care Instructions.\"  Current as of: July 31, 2024  Content Version: 14.4  © 2336-3917 [employer].   Care instructions adapted under license by [employer]. If you have questions about a medical condition or this instruction, always ask your healthcare professional. [employer] disclaims any warranty or liability for your use of this information.    Personalized Preventive Plan for Bill D Foster - 4/8/2025  Medicare offers a range of preventive health benefits. Some of the tests and screenings are paid in full while other may be subject to a deductible, co-insurance, and/or copay.  Some of these benefits include a comprehensive review of your medical history including lifestyle, illnesses that may run in your family, and various assessments and screenings as appropriate.  After reviewing your medical record and screening and assessments performed today your provider may have ordered immunizations, labs, imaging, and/or referrals for you.  A list of these orders (if applicable) as well as your Preventive Care list are included within your After Visit Summary for your review.

## 2025-04-08 NOTE — PROGRESS NOTES
ROXANE Jara CNP   gabapentin (NEURONTIN) 300 MG capsule Take 2 capsules by mouth at bedtime for 30 days. Intended supply: 30 days  Juan Manuel Garcia APRN - CNP       CareTeam (Including outside providers/suppliers regularly involved in providing care):   Patient Care Team:  Juan Manuel Garcia APRN - CNP as PCP - General (Internal Medicine)  Juan Manuel Garcia APRN - CNP as PCP - Empaneled Provider     Recommendations for Preventive Services Due: see orders and patient instructions/AVS.  Recommended screening schedule for the next 5-10 years is provided to the patient in written form: see Patient Instructions/AVS.     Reviewed and updated this visit:  Tobacco  Allergies  Meds  Problems  Med Hx  Surg Hx  Fam Hx  Sexual   Hx               Marshal Keys, was evaluated through a synchronous (real-time) audio-video encounter. The patient (or guardian if applicable) is aware that this is a billable service, which includes applicable co-pays. This Virtual Visit was conducted with patient's (and/or legal guardian's) consent. Patient identification was verified, and a caregiver was present when appropriate.   The patient was located at Home: [address] [place] [address]  Provider was located at Facility (Appt Dept): [address] [place] [address]  Confirm you are appropriately licensed, registered, or certified to deliver care in the state where the patient is located as indicated above. If you are not or unsure, please re-schedule the visit: Yes, I confirm.

## 2025-04-15 ENCOUNTER — OFFICE VISIT (OUTPATIENT)
Dept: INTERNAL MEDICINE CLINIC | Age: 70
End: 2025-04-15
Payer: MEDICARE

## 2025-04-15 VITALS
BODY MASS INDEX: 32.21 KG/M2 | SYSTOLIC BLOOD PRESSURE: 128 MMHG | RESPIRATION RATE: 18 BRPM | DIASTOLIC BLOOD PRESSURE: 88 MMHG | OXYGEN SATURATION: 98 % | HEIGHT: 70 IN | HEART RATE: 68 BPM | WEIGHT: 225 LBS

## 2025-04-15 DIAGNOSIS — I10 BENIGN ESSENTIAL HYPERTENSION: ICD-10-CM

## 2025-04-15 DIAGNOSIS — I25.119 CORONARY ARTERY DISEASE INVOLVING NATIVE CORONARY ARTERY OF NATIVE HEART WITH ANGINA PECTORIS: ICD-10-CM

## 2025-04-15 DIAGNOSIS — E11.9 TYPE 2 DIABETES MELLITUS WITHOUT COMPLICATION, WITHOUT LONG-TERM CURRENT USE OF INSULIN: Primary | ICD-10-CM

## 2025-04-15 DIAGNOSIS — G62.9 PERIPHERAL POLYNEUROPATHY: ICD-10-CM

## 2025-04-15 DIAGNOSIS — E78.2 MIXED HYPERLIPIDEMIA: ICD-10-CM

## 2025-04-15 LAB — HBA1C MFR BLD: 11.4 %

## 2025-04-15 PROCEDURE — 2022F DILAT RTA XM EVC RTNOPTHY: CPT | Performed by: NURSE PRACTITIONER

## 2025-04-15 PROCEDURE — 1036F TOBACCO NON-USER: CPT | Performed by: NURSE PRACTITIONER

## 2025-04-15 PROCEDURE — G8427 DOCREV CUR MEDS BY ELIG CLIN: HCPCS | Performed by: NURSE PRACTITIONER

## 2025-04-15 PROCEDURE — 1160F RVW MEDS BY RX/DR IN RCRD: CPT | Performed by: NURSE PRACTITIONER

## 2025-04-15 PROCEDURE — 83036 HEMOGLOBIN GLYCOSYLATED A1C: CPT | Performed by: NURSE PRACTITIONER

## 2025-04-15 PROCEDURE — 3074F SYST BP LT 130 MM HG: CPT | Performed by: NURSE PRACTITIONER

## 2025-04-15 PROCEDURE — 99214 OFFICE O/P EST MOD 30 MIN: CPT | Performed by: NURSE PRACTITIONER

## 2025-04-15 PROCEDURE — 3046F HEMOGLOBIN A1C LEVEL >9.0%: CPT | Performed by: NURSE PRACTITIONER

## 2025-04-15 PROCEDURE — 1123F ACP DISCUSS/DSCN MKR DOCD: CPT | Performed by: NURSE PRACTITIONER

## 2025-04-15 PROCEDURE — G8417 CALC BMI ABV UP PARAM F/U: HCPCS | Performed by: NURSE PRACTITIONER

## 2025-04-15 PROCEDURE — 3017F COLORECTAL CA SCREEN DOC REV: CPT | Performed by: NURSE PRACTITIONER

## 2025-04-15 PROCEDURE — 1159F MED LIST DOCD IN RCRD: CPT | Performed by: NURSE PRACTITIONER

## 2025-04-15 PROCEDURE — 3079F DIAST BP 80-89 MM HG: CPT | Performed by: NURSE PRACTITIONER

## 2025-04-15 RX ORDER — GABAPENTIN 600 MG/1
600 TABLET ORAL 2 TIMES DAILY
Qty: 60 TABLET | Refills: 3 | Status: SHIPPED | OUTPATIENT
Start: 2025-04-15 | End: 2025-05-15

## 2025-04-15 RX ORDER — LOSARTAN POTASSIUM 100 MG/1
100 TABLET ORAL DAILY
Qty: 30 TABLET | Refills: 3 | Status: SHIPPED | OUTPATIENT
Start: 2025-04-15

## 2025-04-15 RX ORDER — SPIRONOLACTONE 25 MG/1
25 TABLET ORAL DAILY
Qty: 30 TABLET | Refills: 3 | Status: SHIPPED | OUTPATIENT
Start: 2025-04-15

## 2025-04-15 RX ORDER — GABAPENTIN 300 MG/1
600 CAPSULE ORAL NIGHTLY
Qty: 60 CAPSULE | Refills: 3 | Status: CANCELLED | OUTPATIENT
Start: 2025-04-15 | End: 2025-05-15

## 2025-04-15 RX ORDER — ROSUVASTATIN CALCIUM 20 MG/1
20 TABLET, COATED ORAL NIGHTLY
Qty: 30 TABLET | Refills: 3 | Status: SHIPPED | OUTPATIENT
Start: 2025-04-15

## 2025-04-15 RX ORDER — GLIPIZIDE 10 MG/1
10 TABLET ORAL
Qty: 60 TABLET | Refills: 3 | Status: SHIPPED | OUTPATIENT
Start: 2025-04-15

## 2025-04-15 RX ORDER — CARVEDILOL 6.25 MG/1
6.25 TABLET ORAL 2 TIMES DAILY
Qty: 60 TABLET | Refills: 3 | Status: SHIPPED | OUTPATIENT
Start: 2025-04-15

## 2025-04-15 RX ORDER — ROSUVASTATIN CALCIUM 10 MG/1
10 TABLET, COATED ORAL NIGHTLY
Qty: 90 TABLET | Refills: 2 | Status: CANCELLED | OUTPATIENT
Start: 2025-04-15

## 2025-04-15 ASSESSMENT — ENCOUNTER SYMPTOMS
ABDOMINAL DISTENTION: 0
SINUS PAIN: 0
SORE THROAT: 0
SINUS PRESSURE: 0
SHORTNESS OF BREATH: 0
CONSTIPATION: 0
DIARRHEA: 0
COUGH: 0
CHEST TIGHTNESS: 0
EYES NEGATIVE: 1
NAUSEA: 0
WHEEZING: 0
ABDOMINAL PAIN: 0
COLOR CHANGE: 0

## 2025-04-15 NOTE — PROGRESS NOTES
Marshal Keys  1955  04/15/25    Chief Complaint   Patient presents with    Follow-up    Dizziness     Pt reports that he had he felt like he was going to pass out at work. He did report that he had eaten and felt better but was not able to check glucose    neuroapathy     Sharp shooting pain, numbness in both feet, ankles and calves that keeps him up at night        SUBJECTIVE:      Patient is a known type II diabetic which she states he has been for several years and current regimen includes metformin 1 g twice daily and glipizide 10 mg twice daily. He does not monitor his sugars routinely at home. SUPPOSED TO BE ON LANTUS 20 units QHS after results below last fall,however, he has not been taking it. States that the lantus was not covered and costing over $100/month. But sugars still remain very elevated and A1C unchanged still at 11.4 today via POCT testing. He has still been having severe pins and needles sensations/pain in his lower extremities. We did also order a CGM at his last visit, however, he states his insurance would not pay for this either.   Hemoglobin A1C   Date Value Ref Range Status   04/15/2025 11.4 % Final     The patient is currently taking all hypertensive medications compliantly without c/o of any side effects.  Denies chest pain, dyspnea, edema, palpiations. Blood pressure has been averaging 120s/80 over the last several months and maintains on Coreg 6.25 mg twice daily, losartan 100 mg daily, Aldactone 25 mg daily and Lasix 20 mg twice daily.  Also with known history of CAD and s/p CABG x 4. He does follow with local cardiologist Dr. Bolaños.     Patient denies any chest pain, shortness of breath, myalgias, Patient is tolerating cholesterol medications without difficulty.   Continues on Crestor 10 mg daily.    Review of Systems   Constitutional:  Negative for activity change, appetite change, fatigue and fever.   HENT:  Negative for congestion, sinus pressure, sinus pain and sore throat.

## 2025-04-21 RX ORDER — INSULIN GLARGINE 100 [IU]/ML
20 INJECTION, SOLUTION SUBCUTANEOUS NIGHTLY
Qty: 5 ADJUSTABLE DOSE PRE-FILLED PEN SYRINGE | Refills: 2 | Status: SHIPPED | OUTPATIENT
Start: 2025-04-21 | End: 2025-04-22 | Stop reason: SDUPTHER

## 2025-04-22 RX ORDER — INSULIN GLARGINE 100 [IU]/ML
20 INJECTION, SOLUTION SUBCUTANEOUS NIGHTLY
Qty: 5 ADJUSTABLE DOSE PRE-FILLED PEN SYRINGE | Refills: 2 | Status: SHIPPED | OUTPATIENT
Start: 2025-04-22

## 2025-06-12 ENCOUNTER — APPOINTMENT (OUTPATIENT)
Dept: GENERAL RADIOLOGY | Age: 70
End: 2025-06-12
Payer: MEDICARE

## 2025-06-12 ENCOUNTER — HOSPITAL ENCOUNTER (EMERGENCY)
Age: 70
Discharge: HOME | End: 2025-06-12
Payer: MEDICARE

## 2025-06-12 VITALS
OXYGEN SATURATION: 94 % | SYSTOLIC BLOOD PRESSURE: 103 MMHG | RESPIRATION RATE: 18 BRPM | BODY MASS INDEX: 32.21 KG/M2 | HEART RATE: 65 BPM | DIASTOLIC BLOOD PRESSURE: 71 MMHG | TEMPERATURE: 97.4 F | WEIGHT: 225 LBS | HEIGHT: 70 IN

## 2025-06-12 DIAGNOSIS — R73.9 HYPERGLYCEMIA: ICD-10-CM

## 2025-06-12 DIAGNOSIS — R41.82 ALTERED MENTAL STATUS, UNSPECIFIED ALTERED MENTAL STATUS TYPE: Primary | ICD-10-CM

## 2025-06-12 LAB
ALBUMIN SERPL-MCNC: 3.6 G/DL (ref 3.4–5)
ALBUMIN/GLOB SERPL: 1.7 {RATIO} (ref 1.1–2.2)
ALP SERPL-CCNC: 100 U/L (ref 40–129)
ALT SERPL-CCNC: 13 U/L (ref 10–40)
AMPHET UR QL SCN: NEGATIVE
ANION GAP SERPL CALCULATED.3IONS-SCNC: 14 MMOL/L (ref 9–17)
AST SERPL-CCNC: 15 U/L (ref 15–37)
BARBITURATES UR QL SCN: NEGATIVE
BASOPHILS # BLD: 0.04 K/UL
BASOPHILS NFR BLD: 1 % (ref 0–1)
BENZODIAZ UR QL: NEGATIVE
BILIRUB SERPL-MCNC: 0.6 MG/DL (ref 0–1)
BILIRUB UR QL STRIP: NEGATIVE
BUN SERPL-MCNC: 14 MG/DL (ref 7–20)
CALCIUM SERPL-MCNC: 9.4 MG/DL (ref 8.3–10.6)
CANNABINOIDS UR QL SCN: NEGATIVE
CHLORIDE SERPL-SCNC: 101 MMOL/L (ref 99–110)
CLARITY UR: CLEAR
CO2 SERPL-SCNC: 24 MMOL/L (ref 21–32)
COCAINE UR QL SCN: NEGATIVE
COLOR UR: YELLOW
COMMENT: ABNORMAL
CREAT SERPL-MCNC: 1.1 MG/DL (ref 0.8–1.3)
EOSINOPHIL # BLD: 0.14 K/UL
EOSINOPHILS RELATIVE PERCENT: 2 % (ref 0–3)
ERYTHROCYTE [DISTWIDTH] IN BLOOD BY AUTOMATED COUNT: 12.2 % (ref 11.7–14.9)
ETHANOLAMINE SERPL-MCNC: <10 MG/DL (ref 0–0.08)
FENTANYL UR QL: NEGATIVE
GFR, ESTIMATED: 69 ML/MIN/1.73M2
GLUCOSE SERPL-MCNC: 323 MG/DL (ref 74–99)
GLUCOSE UR STRIP-MCNC: >=1000 MG/DL
HCT VFR BLD AUTO: 46.5 % (ref 42–52)
HGB BLD-MCNC: 16.8 G/DL (ref 13.5–18)
HGB UR QL STRIP.AUTO: NEGATIVE
IMM GRANULOCYTES # BLD AUTO: 0.03 K/UL
IMM GRANULOCYTES NFR BLD: 0 %
KETONES UR STRIP-MCNC: ABNORMAL MG/DL
LEUKOCYTE ESTERASE UR QL STRIP: NEGATIVE
LYMPHOCYTES NFR BLD: 2.99 K/UL
LYMPHOCYTES RELATIVE PERCENT: 34 % (ref 24–44)
MAGNESIUM SERPL-MCNC: 1.8 MG/DL (ref 1.8–2.4)
MCH RBC QN AUTO: 32.7 PG (ref 27–31)
MCHC RBC AUTO-ENTMCNC: 36.1 G/DL (ref 32–36)
MCV RBC AUTO: 90.6 FL (ref 78–100)
MONOCYTES NFR BLD: 0.62 K/UL
MONOCYTES NFR BLD: 7 % (ref 0–5)
NEUTROPHILS NFR BLD: 57 % (ref 36–66)
NEUTS SEG NFR BLD: 5 K/UL
NITRITE UR QL STRIP: NEGATIVE
OPIATES UR QL SCN: NEGATIVE
OXYCODONE UR QL SCN: NEGATIVE
PH UR STRIP: 5.5 [PH] (ref 5–8)
PLATELET # BLD AUTO: 283 K/UL (ref 140–440)
PMV BLD AUTO: 10.2 FL (ref 7.5–11.1)
POTASSIUM SERPL-SCNC: 4.7 MMOL/L (ref 3.5–5.1)
PROT SERPL-MCNC: 5.7 G/DL (ref 6.4–8.2)
PROT UR STRIP-MCNC: NEGATIVE MG/DL
RBC # BLD AUTO: 5.13 M/UL (ref 4.6–6.2)
SODIUM SERPL-SCNC: 139 MMOL/L (ref 136–145)
SP GR UR STRIP: >1.03 (ref 1–1.03)
TEST INFORMATION: NORMAL
TROPONIN I SERPL HS-MCNC: 15 NG/L (ref 0–22)
TROPONIN I SERPL HS-MCNC: 17 NG/L (ref 0–22)
UROBILINOGEN UR STRIP-ACNC: 2 EU/DL (ref 0–1)
WBC OTHER # BLD: 8.8 K/UL (ref 4–10.5)

## 2025-06-12 PROCEDURE — 71045 X-RAY EXAM CHEST 1 VIEW: CPT

## 2025-06-12 PROCEDURE — 84484 ASSAY OF TROPONIN QUANT: CPT

## 2025-06-12 PROCEDURE — 81003 URINALYSIS AUTO W/O SCOPE: CPT

## 2025-06-12 PROCEDURE — 83735 ASSAY OF MAGNESIUM: CPT

## 2025-06-12 PROCEDURE — 99285 EMERGENCY DEPT VISIT HI MDM: CPT

## 2025-06-12 PROCEDURE — G0480 DRUG TEST DEF 1-7 CLASSES: HCPCS

## 2025-06-12 PROCEDURE — 93005 ELECTROCARDIOGRAM TRACING: CPT | Performed by: PHYSICIAN ASSISTANT

## 2025-06-12 PROCEDURE — 85025 COMPLETE CBC W/AUTO DIFF WBC: CPT

## 2025-06-12 PROCEDURE — 80053 COMPREHEN METABOLIC PANEL: CPT

## 2025-06-12 PROCEDURE — 80307 DRUG TEST PRSMV CHEM ANLYZR: CPT

## 2025-06-12 ASSESSMENT — LIFESTYLE VARIABLES
HOW OFTEN DO YOU HAVE A DRINK CONTAINING ALCOHOL: NEVER
HOW MANY STANDARD DRINKS CONTAINING ALCOHOL DO YOU HAVE ON A TYPICAL DAY: PATIENT DOES NOT DRINK

## 2025-06-12 ASSESSMENT — PAIN SCALES - GENERAL: PAINLEVEL_OUTOF10: 0

## 2025-06-12 ASSESSMENT — PAIN - FUNCTIONAL ASSESSMENT: PAIN_FUNCTIONAL_ASSESSMENT: 0-10

## 2025-06-12 NOTE — PROGRESS NOTES
Spiritual Health History and Assessment/Progress Note  [facility]    Spiritual/Emotional Needs, Rapid Response,  ,      Name: Marshal Keys MRN: [id number]    Age: 70 y.o.     Sex: male   Language: English   Taoist: None   <principal problem not specified>     Date: 6/12/2025            Total Time Calculated: 10 min              Spiritual Assessment began in [facility] EMERGENCY DEPARTMENT        Referral/Consult From: Rounding   Encounter Overview/Reason: Spiritual/Emotional Needs  Service Provided For: Patient    Morena, Belief, Meaning:   Patient unable to assess at this time  Family/Friends Other: unable to assess at this time.      Importance and Influence:  Patient unable to assess at this time  Family/Friends     Community:  Patient feels well-supported. Support system includes: Spouse/Partner and Extended family  Family/Friends feel well-supported. Support system includes: Extended family    Assessment and Plan of Care:     Patient Interventions include: Facilitated expression of thoughts and feelings  Family/Friends Interventions include: Facilitated expression of thoughts and feelings    Patient Plan of Care: No spiritual needs identified for follow-up and Spiritual Care available upon further referral.  Family/Friends Plan of Care: No spiritual needs identified for follow-up and Spiritual Care available upon further referral    Electronically signed by Chaplain Benjie on 6/12/2025 at 10:51 AM

## 2025-06-12 NOTE — ED NOTES
Medication History  [facility]    Patient Name: Marshal Keys 1955     Medication history has been completed by: Antoinette Nassar [facility]    Source(s) of information: patient and insurance claims      Primary Care Physician: Juan Manuel Garcia APRN - CNP     Pharmacy: [facility]    Allergies as of 06/12/2025 - Fully Reviewed 04/15/2025   Allergen Reaction Noted    Lisinopril  10/16/2012    Niacin  10/16/2012    Vicodin [hydrocodone-acetaminophen] Other (See Comments) 08/07/2013    Percocet [oxycodone-acetaminophen] Nausea And Vomiting 04/05/2020        Prior to Admission medications    Medication Sig Start Date End Date Taking? Authorizing Provider   metFORMIN (GLUCOPHAGE) 500 MG tablet Take 2 tablets by mouth 2 times daily (with meals) 4/15/25  Yes Juan Manuel Garcia APRN - CNP   carvedilol (COREG) 6.25 MG tablet Take 1 tablet by mouth 2 times daily 4/15/25  Yes Juan Manuel Garcia APRN - CNP   glipiZIDE (GLUCOTROL) 10 MG tablet Take 1 tablet by mouth 2 times daily (before meals) 4/15/25  Yes Juan Manuel Garcia APRN - CNP   losartan (COZAAR) 100 MG tablet Take 1 tablet by mouth daily 4/15/25  Yes Juan Manuel Garcia APRN - CNP   spironolactone (ALDACTONE) 25 MG tablet Take 1 tablet by mouth daily 4/15/25  Yes Juan Manuel Garcia APRN - CNP   rosuvastatin (CRESTOR) 20 MG tablet Take 1 tablet by mouth nightly 4/15/25  Yes Juan Manuel Garcia APRN - CNP   gabapentin (NEURONTIN) 600 MG tablet Take 1 tablet by mouth 2 times daily for 30 days.  Patient taking differently: Take 1 tablet by mouth nightly. 06/12/25 Per prescription noted for BID dosing patient reports he only takes 1 tablet at HS 4/15/25 6/12/25 Yes Juan Manuel Garcia APRN - CNP   furosemide (LASIX) 20 MG tablet Take 1 tablet by mouth 2 times daily 10/22/24  Yes Juan Manuel Garcia APRN - CNP   insulin glargine (LANTUS SOLOSTAR) 100 UNIT/ML injection pen Inject 20 Units into the skin nightly  Patient not taking: Reported on 6/12/2025 4/22/25   Juan Manuel Garcia,

## 2025-06-12 NOTE — ED PROVIDER NOTES
I personally saw Marshal Keys and made/approved the management plan and take responsibility for the patient management.    In brief, patient is a 70-year-old that came into the emergency department today as a code stroke from upstairs.  Patient was visiting family and became confused, not acting himself.  Was concerned that he had an uneven gait.  He denies any headache, vision changes, weakness, paresthesia.  Pt seen with juan manuel BUSTOS.    ED course: Patient came in with altered mental status as described above.  Was initially seen by Juan Manuel BUSTOS.  I agree with Juan Manuel's assessment and plan.  I oversaw and agree with all care management.    EKG (if obtained): (All EKG's are interpreted by myself in the absence of a cardiologist)   EKG as interpreted by me  Normal sinus rhythm with sinus arrhythmia at 69 bpm  Right bundle branch block  Old inferior infarct  No STEMI    Final Impression:  1. Altered mental status, unspecified altered mental status type    2. Hyperglycemia      DISPOSITION Decision To Discharge 06/12/2025 03:30:24 PM   DISPOSITION CONDITION Stable           All diagnostic, treatment, and disposition decisions were made by myself in conjunction with the advanced practice provider.    For all further details of the patient's emergency department visit, please see the advanced practice provider's documentation.    Comment: Please note this report has been produced using speech recognition software and may contain errors related to that system including errors in grammar, punctuation, and spelling, as well as words and phrases that may be inappropriate. If there are any questions or concerns please feel free to contact the dictating provider for clarification.       Randy Gamez,   06/13/25 [id number]

## 2025-06-12 NOTE — ED PROVIDER NOTES
[facility] EMERGENCY DEPARTMENT  EMERGENCY DEPARTMENT ENCOUNTER        Pt Name: Marshal Keys  MRN: [id number]  Birthdate 1955  Date of evaluation: 6/12/2025  Provider: Juan Manuel Matthew PA-C  PCP: Juan Manuel Garcia APRN - CNP  Note Started: 10:49 AM EDT 6/12/25      MARICHUY. I have evaluated this patient.        CHIEF COMPLAINT       Chief Complaint   Patient presents with    Altered Mental Status    Gait Problem       HISTORY OF PRESENT ILLNESS: 1 or more Elements     History From: patient, pt's brother    Limitations to history : None    Social Determinants Significantly Affecting Health : Patient has significant healthcare illiteracy. Additional time provided in explanations.    Chief Complaint: confusion, gait problem?    Marshal Keys is a 70 y.o. male with past medical history of hypertension, hyperlipidemia, diabetes, CAD who presents for possible confusion and gait problems.  Patient's wife is admitted upstairs.  His brother was visiting already when patient got to the room today.  Patient's brother states when he got to the room he looked confused, was not himself, there was concern for gait problems so a code stroke was called upstairs.  Brother states patient is acting more normal now.  Patient states his left leg has been hurting after playing golf yesterday and that is why his gait is different.  He denies any headache, vision change, weakness, paresthesia, chest pain, shortness of breath, falls, recent head injury, drug or alcohol use.    Nursing Notes were all reviewed and agreed with or any disagreements were addressed in the HPI.    REVIEW OF SYSTEMS :      Review of Systems   All other systems reviewed and are negative.      Positives and Pertinent negatives as per HPI.     SURGICAL HISTORY     Past Surgical History:   Procedure Laterality Date    ANUS SURGERY      fisure    CHOLECYSTECTOMY      COLONOSCOPY  2008    COLONOSCOPY  11/29/2016    polyps x9, int hem    CORONARY

## 2025-06-13 ENCOUNTER — OFFICE VISIT (OUTPATIENT)
Dept: INTERNAL MEDICINE CLINIC | Age: 70
End: 2025-06-13

## 2025-06-13 VITALS
SYSTOLIC BLOOD PRESSURE: 92 MMHG | RESPIRATION RATE: 20 BRPM | DIASTOLIC BLOOD PRESSURE: 60 MMHG | BODY MASS INDEX: 32.21 KG/M2 | OXYGEN SATURATION: 98 % | HEART RATE: 72 BPM | HEIGHT: 70 IN | WEIGHT: 225 LBS

## 2025-06-13 DIAGNOSIS — E11.9 TYPE 2 DIABETES MELLITUS WITHOUT COMPLICATION, WITHOUT LONG-TERM CURRENT USE OF INSULIN (HCC): Primary | ICD-10-CM

## 2025-06-13 DIAGNOSIS — R40.4 TRANSIENT ALTERATION OF AWARENESS: ICD-10-CM

## 2025-06-13 DIAGNOSIS — I10 BENIGN ESSENTIAL HYPERTENSION: ICD-10-CM

## 2025-06-13 DIAGNOSIS — L03.116 CELLULITIS OF LEFT LEG: ICD-10-CM

## 2025-06-13 DIAGNOSIS — I25.119 CORONARY ARTERY DISEASE INVOLVING NATIVE CORONARY ARTERY OF NATIVE HEART WITH ANGINA PECTORIS: ICD-10-CM

## 2025-06-13 LAB
EKG ATRIAL RATE: 69 BPM
EKG DIAGNOSIS: NORMAL
EKG P AXIS: 44 DEGREES
EKG P-R INTERVAL: 154 MS
EKG Q-T INTERVAL: 436 MS
EKG QRS DURATION: 134 MS
EKG QTC CALCULATION (BAZETT): 467 MS
EKG R AXIS: 1 DEGREES
EKG T AXIS: 34 DEGREES
EKG VENTRICULAR RATE: 69 BPM

## 2025-06-13 PROCEDURE — 93010 ELECTROCARDIOGRAM REPORT: CPT | Performed by: INTERNAL MEDICINE

## 2025-06-13 RX ORDER — LOSARTAN POTASSIUM 50 MG/1
50 TABLET ORAL DAILY
Qty: 30 TABLET | Refills: 3 | Status: SHIPPED | OUTPATIENT
Start: 2025-06-13

## 2025-06-13 RX ORDER — SULFAMETHOXAZOLE AND TRIMETHOPRIM 800; 160 MG/1; MG/1
1 TABLET ORAL 2 TIMES DAILY
Qty: 14 TABLET | Refills: 0 | Status: SHIPPED | OUTPATIENT
Start: 2025-06-13 | End: 2025-06-20

## 2025-06-13 RX ORDER — INSULIN GLARGINE 100 [IU]/ML
20 INJECTION, SOLUTION SUBCUTANEOUS NIGHTLY
Qty: 5 ADJUSTABLE DOSE PRE-FILLED PEN SYRINGE | Refills: 2 | Status: SHIPPED | OUTPATIENT
Start: 2025-06-13

## 2025-06-13 RX ORDER — GABAPENTIN 600 MG/1
600 TABLET ORAL 2 TIMES DAILY
Qty: 60 TABLET | Refills: 3 | Status: SHIPPED | OUTPATIENT
Start: 2025-06-13 | End: 2025-07-13

## 2025-06-13 ASSESSMENT — ENCOUNTER SYMPTOMS
ABDOMINAL PAIN: 0
SORE THROAT: 0
NAUSEA: 0
CONSTIPATION: 0
SINUS PAIN: 0
SHORTNESS OF BREATH: 0
EYES NEGATIVE: 1
COLOR CHANGE: 0
CHEST TIGHTNESS: 0
WHEEZING: 0
ABDOMINAL DISTENTION: 0
SINUS PRESSURE: 0
DIARRHEA: 0
COUGH: 0

## 2025-06-13 NOTE — PROGRESS NOTES
provider in over a year.  Referral sent back and patient strongly encouraged to schedule follow-up.    Type 2 diabetes mellitus without complication, without long-term current use of insulin ([facility])  -     CBC with Auto Differential; Future  -     Comprehensive Metabolic Panel; Future  -     Hemoglobin A1C; Future  -     Lipid, Fasting; Future  -     [facility] Assist  -     insulin glargine (LANTUS SOLOSTAR) 100 UNIT/ML injection pen; Inject 20 Units into the skin nightly    Benign essential hypertension  -     losartan (COZAAR) 50 MG tablet; Take 1 tablet by mouth daily  -     CBC with Auto Differential; Future  -     Comprehensive Metabolic Panel; Future  -     Hemoglobin A1C; Future  -     Lipid, Fasting; Future    Transient alteration of awareness  -     CBC with Auto Differential; Future  -     Comprehensive Metabolic Panel; Future  -     Hemoglobin A1C; Future  -     Lipid, Fasting; Future    Coronary artery disease involving native coronary artery of native heart with angina pectoris  -     Lulú Lopez MD, Cardiology, [place]    Cellulitis of left leg  -     sulfamethoxazole-trimethoprim (BACTRIM DS;SEPTRA DS) 800-160 MG per tablet; Take 1 tablet by mouth 2 times daily for 7 days    Course of treatment, including any medications, possible imaging, referrals, and follow ups discussed with patient. All risks and benefits and possible side effects discussed with patient who agrees to plan of care and verbalizes understanding. All labs and imaging reviewed.              No data to display                Return in about 1 month (around 7/13/2025).    Royer note this reports has been produced speech recognition software and may contain errors related to that system including errors in grammar, punctuation, and spelling, as well as words and phrases that may be appropriate. If there are any questions or concerns please feel free to contact the dictating provider for clarification.

## 2025-06-16 ENCOUNTER — TELEPHONE (OUTPATIENT)
Dept: CARDIOLOGY CLINIC | Age: 70
End: 2025-06-16

## 2025-06-16 NOTE — TELEPHONE ENCOUNTER
Left a Vm for pt to call back and schedule an ov with Dr. Bolaños ( Last Ov was on 08/05/2024)     Referral by Dr Garcia  Dx: Coronary artery disease involving native coronary artery of native heart w angina pectoris.      First attempt to contact patient; 06/16   Last log in to my chart 9/2024.

## 2025-06-18 ENCOUNTER — TELEPHONE (OUTPATIENT)
Dept: CARDIOLOGY CLINIC | Age: 70
End: 2025-06-18

## 2025-06-27 ENCOUNTER — TELEPHONE (OUTPATIENT)
Dept: CARDIOLOGY CLINIC | Age: 70
End: 2025-06-27

## 2025-06-27 ENCOUNTER — OFFICE VISIT (OUTPATIENT)
Dept: CARDIOLOGY CLINIC | Age: 70
End: 2025-06-27
Payer: MEDICARE

## 2025-06-27 VITALS
DIASTOLIC BLOOD PRESSURE: 74 MMHG | HEART RATE: 84 BPM | HEIGHT: 70 IN | WEIGHT: 229 LBS | SYSTOLIC BLOOD PRESSURE: 118 MMHG | BODY MASS INDEX: 32.78 KG/M2

## 2025-06-27 DIAGNOSIS — I10 ESSENTIAL (PRIMARY) HYPERTENSION: ICD-10-CM

## 2025-06-27 DIAGNOSIS — R42 DIZZINESS: ICD-10-CM

## 2025-06-27 DIAGNOSIS — R94.31 ABNORMAL ECG DURING EXERCISE STRESS TEST: ICD-10-CM

## 2025-06-27 DIAGNOSIS — R06.02 SHORTNESS OF BREATH: ICD-10-CM

## 2025-06-27 DIAGNOSIS — I42.9 CARDIOMYOPATHY, UNSPECIFIED TYPE (HCC): ICD-10-CM

## 2025-06-27 DIAGNOSIS — E66.811 CLASS 1 OBESITY DUE TO EXCESS CALORIES WITH SERIOUS COMORBIDITY AND BODY MASS INDEX (BMI) OF 32.0 TO 32.9 IN ADULT: ICD-10-CM

## 2025-06-27 DIAGNOSIS — E78.2 MIXED HYPERLIPIDEMIA: ICD-10-CM

## 2025-06-27 DIAGNOSIS — I25.119 CORONARY ARTERY DISEASE INVOLVING NATIVE CORONARY ARTERY OF NATIVE HEART WITH ANGINA PECTORIS: Primary | ICD-10-CM

## 2025-06-27 DIAGNOSIS — E66.09 CLASS 1 OBESITY DUE TO EXCESS CALORIES WITH SERIOUS COMORBIDITY AND BODY MASS INDEX (BMI) OF 32.0 TO 32.9 IN ADULT: ICD-10-CM

## 2025-06-27 PROCEDURE — 3017F COLORECTAL CA SCREEN DOC REV: CPT | Performed by: INTERNAL MEDICINE

## 2025-06-27 PROCEDURE — 3074F SYST BP LT 130 MM HG: CPT | Performed by: INTERNAL MEDICINE

## 2025-06-27 PROCEDURE — G8427 DOCREV CUR MEDS BY ELIG CLIN: HCPCS | Performed by: INTERNAL MEDICINE

## 2025-06-27 PROCEDURE — 3078F DIAST BP <80 MM HG: CPT | Performed by: INTERNAL MEDICINE

## 2025-06-27 PROCEDURE — 1159F MED LIST DOCD IN RCRD: CPT | Performed by: INTERNAL MEDICINE

## 2025-06-27 PROCEDURE — 1123F ACP DISCUSS/DSCN MKR DOCD: CPT | Performed by: INTERNAL MEDICINE

## 2025-06-27 PROCEDURE — 1036F TOBACCO NON-USER: CPT | Performed by: INTERNAL MEDICINE

## 2025-06-27 PROCEDURE — 99214 OFFICE O/P EST MOD 30 MIN: CPT | Performed by: INTERNAL MEDICINE

## 2025-06-27 PROCEDURE — G8417 CALC BMI ABV UP PARAM F/U: HCPCS | Performed by: INTERNAL MEDICINE

## 2025-06-27 NOTE — PATIENT INSTRUCTIONS
Thank you for allowing us to care for you today!   We want to ensure we can follow your treatment plan and we strive to give you the best outcomes and experience possible.   If you ever have a life threatening emergency and call 911 - for an ambulance (EMS)  REMEMBER  Our providers can only care for you at:   [facility] or [facility]   Even if you have someone take you or you drive yourself we can only care for you in a [facility] facility. Our providers are not setup at the other healthcare locations!    PLEASE CALL OUR OFFICE DURING NORMAL BUSINESS HOURS  Monday through Friday 8 am to 5 pm  AFTER HOURS the physician on-call cannot help with scheduling, rescheduling, procedure instruction questions or any type of medication need or issue.  [facility] P:[phone] - [facility] P:[phone] - [facility] P:[phone]      If you receive a survey:  We would appreciate you taking the time to share your experience concerning your provider visit in the office.    These surveys are confidential!  We are eager to improve and are counting on you to share your feedback so we can ensure you get the best care possible.

## 2025-06-27 NOTE — PROGRESS NOTES
CLINICAL STAFF DOCUMENTATION    Dr. Lulú Keys  1955  [id number]    Have you had any Chest Pain recently? - No    Have you had any Shortness of Breath - pt states sometimes has SOB     Have you had any dizziness - No    Have you had any palpitations recently? - No      Do you have any edema - swelling in No        Is the patient on any of the following medications - pt unsure of meds, did not bring medications or list   Called [facility] ([phone]) & [facility] ([phone]) - verified medications    If Yes DO EKG - Needs done every 3 months    When did you have your last labs drawn 10/2024  Do we have the labs in their chart Yes    If we do not have these labs, you are retrieve these labs for the provider!    Do you need any prescriptions refilled? - No    Do you have a surgery or procedure scheduled in the near future - No    Do use tobacco products? - No  Do you drink alcohol? - No  Do you use any illicit drugs? - No  Caffeine? - Yes  How much caffeine? Coke zero          Check medication list thoroughly!!! AND RECONCILE OUTSIDE MEDICATIONS  If dose has changed change the entire order not just the MG  BE SURE TO ASK PATIENT IF THEY NEED MEDICATION REFILLS  Verify Pharmacy and update if incorrect    Add to every patient's \"wrap up\" the following dot phrase AFTERVISITCARDIOHEARTHOUSE and ensure we explain this to our patients    
HFA) 17 MCG/ACT inhaler Inhale 2 puffs into the lungs every 6 hours (Patient not taking: Reported on 6/12/2025) 1 each 3    aspirin 325 MG tablet Take 1 tablet by mouth daily (Patient not taking: Reported on 6/27/2025) 30 tablet 3    Continuous Glucose Sensor (FREESTYLE MEGAN 3 SENSOR) MISC 1 Device by Does not apply route See Admin Instructions (Patient not taking: Reported on 6/27/2025) 3 each 3    nitroGLYCERIN (NITROSTAT) 0.4 MG SL tablet up to max of 3 total doses. If no relief after 1 dose, call 911. (Patient not taking: Reported on 6/27/2025) 25 tablet 3     No current facility-administered medications for this visit.     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 Appropriate hemodynamic response to exercise is noted.    Hyperlipidemia     Hypertension     S/P CABG x 4 04/09/2020     Past Surgical History:   Procedure Laterality Date    ANUS SURGERY      fisure    CHOLECYSTECTOMY      COLONOSCOPY  2008    COLONOSCOPY  11/29/2016    polyps x9, int hem    CORONARY ARTERY BYPASS GRAFT  04/09/2020    x4 SVG-LAD, OM, PDA &  LIMA-DIAG    CORONARY ARTERY BYPASS GRAFT N/A 04/09/2020    CABG CORONARY ARTERY BYPASS x4 WITH LIMA, INTRAOP DEVEN, ENDOHARVEST OF THE LEFT SAPHENOUS VEIN performed by Leo García MD at [facility] OR    ENDOSCOPY, COLON, DIAGNOSTIC  11/29/2016    mild reflux esophagitis, non obstructive esophageal

## 2025-07-18 DIAGNOSIS — E78.2 MIXED HYPERLIPIDEMIA: ICD-10-CM

## 2025-07-18 DIAGNOSIS — I25.119 CORONARY ARTERY DISEASE INVOLVING NATIVE CORONARY ARTERY OF NATIVE HEART WITH ANGINA PECTORIS: ICD-10-CM

## 2025-07-18 DIAGNOSIS — E11.9 TYPE 2 DIABETES MELLITUS WITHOUT COMPLICATION, WITHOUT LONG-TERM CURRENT USE OF INSULIN (HCC): ICD-10-CM

## 2025-07-18 DIAGNOSIS — I10 BENIGN ESSENTIAL HYPERTENSION: ICD-10-CM

## 2025-07-18 RX ORDER — ROSUVASTATIN CALCIUM 20 MG/1
TABLET, COATED ORAL
Qty: 90 TABLET | Refills: 0 | Status: SHIPPED | OUTPATIENT
Start: 2025-07-18

## 2025-07-18 RX ORDER — GLIPIZIDE 10 MG/1
TABLET ORAL
Qty: 180 TABLET | Refills: 0 | Status: SHIPPED | OUTPATIENT
Start: 2025-07-18

## 2025-07-18 RX ORDER — CARVEDILOL 6.25 MG/1
6.25 TABLET ORAL 2 TIMES DAILY
Qty: 180 TABLET | Refills: 0 | Status: SHIPPED | OUTPATIENT
Start: 2025-07-18

## 2025-07-18 RX ORDER — SPIRONOLACTONE 25 MG/1
25 TABLET ORAL DAILY
Qty: 90 TABLET | Refills: 0 | Status: SHIPPED | OUTPATIENT
Start: 2025-07-18

## 2025-07-28 ENCOUNTER — OFFICE VISIT (OUTPATIENT)
Dept: INTERNAL MEDICINE CLINIC | Age: 70
End: 2025-07-28
Payer: MEDICARE

## 2025-07-28 VITALS
RESPIRATION RATE: 22 BRPM | DIASTOLIC BLOOD PRESSURE: 78 MMHG | HEART RATE: 86 BPM | OXYGEN SATURATION: 99 % | WEIGHT: 233 LBS | BODY MASS INDEX: 33.36 KG/M2 | HEIGHT: 70 IN | SYSTOLIC BLOOD PRESSURE: 128 MMHG

## 2025-07-28 DIAGNOSIS — I25.119 CORONARY ARTERY DISEASE INVOLVING NATIVE CORONARY ARTERY OF NATIVE HEART WITH ANGINA PECTORIS: ICD-10-CM

## 2025-07-28 DIAGNOSIS — E11.9 TYPE 2 DIABETES MELLITUS WITHOUT COMPLICATION, WITHOUT LONG-TERM CURRENT USE OF INSULIN (HCC): Primary | ICD-10-CM

## 2025-07-28 DIAGNOSIS — G62.9 PERIPHERAL POLYNEUROPATHY: ICD-10-CM

## 2025-07-28 DIAGNOSIS — I10 BENIGN ESSENTIAL HYPERTENSION: ICD-10-CM

## 2025-07-28 DIAGNOSIS — E78.2 MIXED HYPERLIPIDEMIA: ICD-10-CM

## 2025-07-28 PROCEDURE — 2022F DILAT RTA XM EVC RTNOPTHY: CPT | Performed by: NURSE PRACTITIONER

## 2025-07-28 PROCEDURE — 1036F TOBACCO NON-USER: CPT | Performed by: NURSE PRACTITIONER

## 2025-07-28 PROCEDURE — 3074F SYST BP LT 130 MM HG: CPT | Performed by: NURSE PRACTITIONER

## 2025-07-28 PROCEDURE — 1160F RVW MEDS BY RX/DR IN RCRD: CPT | Performed by: NURSE PRACTITIONER

## 2025-07-28 PROCEDURE — 99214 OFFICE O/P EST MOD 30 MIN: CPT | Performed by: NURSE PRACTITIONER

## 2025-07-28 PROCEDURE — 3078F DIAST BP <80 MM HG: CPT | Performed by: NURSE PRACTITIONER

## 2025-07-28 PROCEDURE — 3046F HEMOGLOBIN A1C LEVEL >9.0%: CPT | Performed by: NURSE PRACTITIONER

## 2025-07-28 PROCEDURE — 1159F MED LIST DOCD IN RCRD: CPT | Performed by: NURSE PRACTITIONER

## 2025-07-28 PROCEDURE — G2211 COMPLEX E/M VISIT ADD ON: HCPCS | Performed by: NURSE PRACTITIONER

## 2025-07-28 PROCEDURE — 1123F ACP DISCUSS/DSCN MKR DOCD: CPT | Performed by: NURSE PRACTITIONER

## 2025-07-28 PROCEDURE — G8417 CALC BMI ABV UP PARAM F/U: HCPCS | Performed by: NURSE PRACTITIONER

## 2025-07-28 PROCEDURE — G8427 DOCREV CUR MEDS BY ELIG CLIN: HCPCS | Performed by: NURSE PRACTITIONER

## 2025-07-28 PROCEDURE — 3017F COLORECTAL CA SCREEN DOC REV: CPT | Performed by: NURSE PRACTITIONER

## 2025-07-28 RX ORDER — INSULIN GLARGINE 100 [IU]/ML
20 INJECTION, SOLUTION SUBCUTANEOUS NIGHTLY
Qty: 5 ADJUSTABLE DOSE PRE-FILLED PEN SYRINGE | Refills: 2 | Status: SHIPPED | OUTPATIENT
Start: 2025-07-28

## 2025-07-28 RX ORDER — GABAPENTIN 600 MG/1
600 TABLET ORAL 2 TIMES DAILY
Qty: 60 TABLET | Refills: 3 | Status: SHIPPED | OUTPATIENT
Start: 2025-07-28 | End: 2025-08-27

## 2025-07-28 ASSESSMENT — ENCOUNTER SYMPTOMS
CHEST TIGHTNESS: 0
CONSTIPATION: 0
EYES NEGATIVE: 1
SINUS PAIN: 0
COLOR CHANGE: 0
ABDOMINAL DISTENTION: 0
WHEEZING: 0
SORE THROAT: 0
ABDOMINAL PAIN: 0
COUGH: 0
SHORTNESS OF BREATH: 0
SINUS PRESSURE: 0
DIARRHEA: 0
NAUSEA: 0

## 2025-07-28 NOTE — PROGRESS NOTES
Marsahl Keys  1955 07/28/25    Chief Complaint   Patient presents with    Follow-up       SUBJECTIVE:      The patient is currently taking all hypertensive medications compliantly without c/o of any side effects.  Denies chest pain, dyspnea, edema, palpiations. Blood pressure has been averaging  120/80 over the last month. Continues on Losartan 50 mg daily. Also aldactone 25 mg daily.     DM- remains on Metformin 1g BID ang Glipizide 10 mg BID. He has not been taking the insulin, primarily due to the cost. We did send referral over to [employer], however, he states the paperwork was to bothersome so did not pursue. He does not check his blood sugar at home often, but when he does it is often 200-300+. Often has issues with his home glucometers.     Patient denies any chest pain, shortness of breath, myalgias, Patient is tolerating cholesterol medications without difficulty.   Lab Results   Component Value Date     (H) 10/16/2024     Review of Systems   Constitutional:  Negative for activity change, appetite change, fatigue and fever.   HENT:  Negative for congestion, sinus pressure, sinus pain and sore throat.    Eyes: Negative.    Respiratory:  Negative for cough, chest tightness, shortness of breath and wheezing.    Cardiovascular:  Negative for chest pain and palpitations.   Gastrointestinal:  Negative for abdominal distention, abdominal pain, constipation, diarrhea and nausea.   Genitourinary:  Negative for difficulty urinating, dysuria, flank pain, frequency and hematuria.   Musculoskeletal:  Negative for arthralgias, gait problem, joint swelling and myalgias.   Skin:  Negative for color change and rash.   Neurological:  Positive for numbness. Negative for dizziness and light-headedness.   Hematological: Negative.    Psychiatric/Behavioral: Negative.         OBJECTIVE:    /78   Pulse 86   Resp 22   Ht 1.778 m (5' 10\")   Wt 105.7 kg (233 lb)   SpO2 99%   BMI 33.43 kg/m²     Physical

## 2025-09-02 ENCOUNTER — OFFICE VISIT (OUTPATIENT)
Dept: FAMILY MEDICINE CLINIC | Age: 70
End: 2025-09-02
Payer: MEDICARE

## 2025-09-02 VITALS
DIASTOLIC BLOOD PRESSURE: 62 MMHG | SYSTOLIC BLOOD PRESSURE: 100 MMHG | WEIGHT: 229 LBS | OXYGEN SATURATION: 90 % | BODY MASS INDEX: 32.86 KG/M2 | HEART RATE: 69 BPM

## 2025-09-02 DIAGNOSIS — Z95.1 S/P CABG (CORONARY ARTERY BYPASS GRAFT): ICD-10-CM

## 2025-09-02 DIAGNOSIS — G62.9 PERIPHERAL POLYNEUROPATHY: ICD-10-CM

## 2025-09-02 DIAGNOSIS — E11.65 UNCONTROLLED TYPE 2 DIABETES MELLITUS WITH HYPERGLYCEMIA (HCC): Primary | ICD-10-CM

## 2025-09-02 DIAGNOSIS — Z76.89 ENCOUNTER TO ESTABLISH CARE: ICD-10-CM

## 2025-09-02 DIAGNOSIS — I25.119 CORONARY ARTERY DISEASE INVOLVING NATIVE CORONARY ARTERY OF NATIVE HEART WITH ANGINA PECTORIS: ICD-10-CM

## 2025-09-02 LAB — HBA1C MFR BLD: 9.4 %

## 2025-09-02 PROCEDURE — 2022F DILAT RTA XM EVC RTNOPTHY: CPT

## 2025-09-02 PROCEDURE — 1159F MED LIST DOCD IN RCRD: CPT

## 2025-09-02 PROCEDURE — 1123F ACP DISCUSS/DSCN MKR DOCD: CPT

## 2025-09-02 PROCEDURE — 99215 OFFICE O/P EST HI 40 MIN: CPT

## 2025-09-02 PROCEDURE — 3078F DIAST BP <80 MM HG: CPT

## 2025-09-02 PROCEDURE — 3046F HEMOGLOBIN A1C LEVEL >9.0%: CPT

## 2025-09-02 PROCEDURE — 3074F SYST BP LT 130 MM HG: CPT

## 2025-09-02 PROCEDURE — 3017F COLORECTAL CA SCREEN DOC REV: CPT

## 2025-09-02 PROCEDURE — 1160F RVW MEDS BY RX/DR IN RCRD: CPT

## 2025-09-02 PROCEDURE — G8427 DOCREV CUR MEDS BY ELIG CLIN: HCPCS

## 2025-09-02 PROCEDURE — G8417 CALC BMI ABV UP PARAM F/U: HCPCS

## 2025-09-02 PROCEDURE — 83036 HEMOGLOBIN GLYCOSYLATED A1C: CPT

## 2025-09-02 PROCEDURE — 1036F TOBACCO NON-USER: CPT

## (undated) DEVICE — INTENDED FOR TISSUE SEPARATION, AND OTHER PROCEDURES THAT REQUIRE A SHARP SURGICAL BLADE TO PUNCTURE OR CUT.: Brand: BARD-PARKER ® STAINLESS STEEL BLADES

## (undated) DEVICE — Z INACTIVE USE 2540311 LEAD PACE L475MM CHN A OR V MYOCARDIAL STEROID ELUT SIL

## (undated) DEVICE — SUTURE VCRL SZ 2 L27IN ABSRB UD L65MM TP-1 1/2 CIR J849G

## (undated) DEVICE — SENSOR OXMTR SM AD DISP FOR INVOS SYS

## (undated) DEVICE — GOWN,SIRUS,FABRNF,RAGLAN,XL,ST,28/CS: Brand: MEDLINE

## (undated) DEVICE — GLOVE SURG SZ 65 CRM LTX FREE POLYISOPRENE POLYMER BEAD ANTI

## (undated) DEVICE — BLANKET THER AD W24XL60IN FAB COVERING SUP SFT ULT THN LTWT

## (undated) DEVICE — KIT CVC AD 7FR L20CM POLYUR BLU FLEXTIP ANTIMIC MULTILUMEN

## (undated) DEVICE — SUTURE NONABSORBABLE MONOFILAMENT 6-0 C-1 4X18 IN PROLENE M8718

## (undated) DEVICE — TUBING SUCT 9 11FR L475IN RIG SHFT MINI SUC TIP DLP

## (undated) DEVICE — SUTURE S STL SZ 5 L18IN NONABSORBABLE SIL L48MM CCS 1/4 CIR M657G

## (undated) DEVICE — SUTURE ETHIB EXCL X BR GRN V-7 DA 2-0 30 PX977 PX977H

## (undated) DEVICE — GLOVE SURG SZ 6 THK91MIL LTX FREE SYN POLYISOPRENE ANTI

## (undated) DEVICE — CONNECTOR DRNGE W3/8-0.5XH3/16XL3/16IN 2:1 SIL CARD STR

## (undated) DEVICE — GLOVE ORANGE PI 8   MSG9080

## (undated) DEVICE — SUTURE VCRL 2-0 L36IN ABSRB UD CTX L48MM 1/2 CIR TAPERPOINT J979H

## (undated) DEVICE — CLIP SM RED INTERN HMOCLP TITAN LIGATING

## (undated) DEVICE — GOWN,SIRUS,FABRNF,RAGLAN,L,ST,30/CS: Brand: MEDLINE

## (undated) DEVICE — BLADE OPHTH GRN ROUNDED TIP 1 SIDE SHRP GRINDLESS MINI-BLDE

## (undated) DEVICE — DRAPE,UTILITY,XL,4/PK,STERILE: Brand: MEDLINE

## (undated) DEVICE — DRAIN,WOUND,ROUND,24FR,5/16",FULL-FLUTED: Brand: MEDLINE

## (undated) DEVICE — BLADE SAW STRNM 10X35X0.6MM

## (undated) DEVICE — SWAN-GANZ THERMODILUTION CATHETER: Brand: SWAN-GANZ

## (undated) DEVICE — Z INACTIVE USE 2641837 CLIP LIG M BLU TI HRT SHP WIRE HORZ 600 PER BX

## (undated) DEVICE — SOLUTION IV IRRIG POUR BRL 0.9% SODIUM CHL 2F7124

## (undated) DEVICE — Device: Brand: VIRTUOSAPH PLUS WITH RADIAL INDICATION

## (undated) DEVICE — SUTURE SURGLON SZ 1 L30IN NONABSORBABLE BLK NO NDL NYL PRE 8886191971

## (undated) DEVICE — CATHETER ETER IV L1IN OD22GA POLYUR PERIPH WNG HUB SFTY SHLD

## (undated) DEVICE — SUTURE ETHBND EXCEL SZ 2-0 L36IN NONABSORBABLE GRN L26MM SH X523H

## (undated) DEVICE — RETRACTOR SURG INSRT SUT HLD OCTOBASE

## (undated) DEVICE — CHLORAPREP 26ML ORANGE

## (undated) DEVICE — SUTURE PROL SZ 4-0 L36IN NONABSORBABLE BLU L26MM SH 1/2 CIR 8521H

## (undated) DEVICE — CANNULA PERF L15IN DIA29FR VEN 3 STG THN WALL DSGN W  VENT

## (undated) DEVICE — GLOVE ORANGE PI 7   MSG9070

## (undated) DEVICE — DRAIN SURG SGL COLL PT TB FOR ATS BG OASIS

## (undated) DEVICE — Z INACTIVE USE 2660664 SOLUTION IRRIG 3000ML 0.9% SOD CHL USP UROMATIC PLAS CONT

## (undated) DEVICE — CANNULA ART 20FR NVENT 3 8IN CONN EOPA 3D

## (undated) DEVICE — OPEN HEART PACK: Brand: MEDLINE INDUSTRIES, INC.

## (undated) DEVICE — THORACIC CATHETER, STRAIGHT, SILICONE, WITH CLOTSTOP®: Brand: AXIOM® ATRAUM™ WITH CLOTSTOP®

## (undated) DEVICE — FOGARTY - HYDRAGRIP SURGICAL - CLAMP INSERTS: Brand: FOGARTY SOFTJAW

## (undated) DEVICE — 6 FOOT DISPOSABLE EXTENSION CABLE WITH SAFE CONNECT / SCREW-DOWN

## (undated) DEVICE — 3M™ STERI-DRAPE™ INSTRUMENT POUCH 1018: Brand: STERI-DRAPE™

## (undated) DEVICE — BLADE ES L2.75IN ELASTOMERIC COAT DURABLE BEND UPTO 90DEG

## (undated) DEVICE — ROTATING SURGICAL PUNCHES, 1 PER POUCH: Brand: A&E MEDICAL / ROTATING SURGICAL PUNCHES

## (undated) DEVICE — SUTURE MCRYL SZ 3-0 L27IN ABSRB UD L24MM PS-1 3/8 CIR PRIM Y936H

## (undated) DEVICE — SUTURE PROL 7-0 L18IN NONABSORBABLE BLU L9.3MM BV-1 3/8 CIR M8701

## (undated) DEVICE — SUTURE S STL SZ 5 L18IN NONABSORBABLE SIL V-40 L48MM 1/2 M650G

## (undated) DEVICE — TUBING, SUCTION, 3/16" X 10', STRAIGHT: Brand: MEDLINE

## (undated) DEVICE — SUTURE NRLN SZ 1 L18IN NONABSORBABLE BLK L36MM CT-1 1/2 CIR C520D

## (undated) DEVICE — SPONGE LAP W18XL18IN WHT COT 4 PLY FLD STRUNG RADPQ DISP ST

## (undated) DEVICE — WAX SURG 2.5GM HEMSTAT BNE BEESWAX PARAFFIN ISO PALMITATE

## (undated) DEVICE — CANNULA PERF L5.5IN DIA9FR AORT ROOT AG STD TIP W/ VENT LN

## (undated) DEVICE — GLOVE SURG SZ 8 CRM LTX FREE POLYISOPRENE POLYMER BEAD ANTI

## (undated) DEVICE — ELECTRODE ES L4IN PTFE INSUL BLDE W/ SFTY SL DISP EDGE